# Patient Record
Sex: MALE | Race: WHITE | Employment: OTHER | URBAN - METROPOLITAN AREA
[De-identification: names, ages, dates, MRNs, and addresses within clinical notes are randomized per-mention and may not be internally consistent; named-entity substitution may affect disease eponyms.]

---

## 2017-02-23 ENCOUNTER — OFFICE VISIT (OUTPATIENT)
Dept: CARDIOLOGY CLINIC | Age: 67
End: 2017-02-23

## 2017-02-23 ENCOUNTER — HOSPITAL ENCOUNTER (OUTPATIENT)
Dept: LAB | Age: 67
Discharge: HOME | End: 2017-02-23
Payer: MEDICARE

## 2017-02-23 VITALS
HEIGHT: 70 IN | RESPIRATION RATE: 16 BRPM | WEIGHT: 232 LBS | DIASTOLIC BLOOD PRESSURE: 90 MMHG | OXYGEN SATURATION: 98 % | SYSTOLIC BLOOD PRESSURE: 140 MMHG | BODY MASS INDEX: 33.21 KG/M2 | HEART RATE: 114 BPM

## 2017-02-23 DIAGNOSIS — E78.2 MIXED HYPERLIPIDEMIA: ICD-10-CM

## 2017-02-23 DIAGNOSIS — I10 ESSENTIAL HYPERTENSION: ICD-10-CM

## 2017-02-23 DIAGNOSIS — I25.10 CORONARY ARTERY DISEASE INVOLVING NATIVE CORONARY ARTERY OF NATIVE HEART WITHOUT ANGINA PECTORIS: Primary | ICD-10-CM

## 2017-02-23 DIAGNOSIS — I48.0 PAROXYSMAL ATRIAL FIBRILLATION (HCC): ICD-10-CM

## 2017-02-23 DIAGNOSIS — E11.65 TYPE 2 DIABETES MELLITUS WITH HYPERGLYCEMIA, WITHOUT LONG-TERM CURRENT USE OF INSULIN (HCC): ICD-10-CM

## 2017-02-23 DIAGNOSIS — I49.3 PVC (PREMATURE VENTRICULAR CONTRACTION): ICD-10-CM

## 2017-02-23 PROCEDURE — 36415 COLL VENOUS BLD VENIPUNCTURE: CPT

## 2017-02-23 PROCEDURE — 80061 LIPID PANEL: CPT

## 2017-02-23 PROCEDURE — 80053 COMPREHEN METABOLIC PANEL: CPT

## 2017-02-23 RX ORDER — CARVEDILOL 12.5 MG/1
TABLET ORAL 2 TIMES DAILY WITH MEALS
COMMUNITY
End: 2017-06-19 | Stop reason: SDUPTHER

## 2017-02-23 NOTE — PROGRESS NOTES
Chief Complaint   Patient presents with    Hypertension     6 month follow up. Concerns regarding heart rate, BP. States in increase stress. Denies chest pain/shortness of breath/swelling.  Coronary Artery Disease     Patient states medical research on [employer]. States recent episode of dizziness,nausea while driving. States possible panic attack.

## 2017-02-23 NOTE — PROGRESS NOTES
Patient was given printed rx for eliquis 5 mg bid to price. Coreg was doubled to 12.5 mg bid. He will double up on 6.25 mg and take 2 tabs in the am and 2 tabs in the pm. He will call if he needs refill prior to 1 mo f/u. I changed dose to 12.5 mg bid in med rec.

## 2017-02-23 NOTE — PROGRESS NOTES
HISTORY OF PRESENT ILLNESS  Cezar Salas is a 78 y.o. male     SUMMARY:   Problem List  Date Reviewed: 2/23/2017          Codes Class Noted    PVC (premature ventricular contraction) ICD-10-CM: I49.3  ICD-9-CM: 427.69  9/4/2014        HTN (hypertension) ICD-10-CM: I10  ICD-9-CM: 401.9  7/21/2014        Hyperlipidemia ICD-10-CM: E78.5  ICD-9-CM: 272.4  7/21/2014        CAD (coronary artery disease) ICD-10-CM: I25.10  ICD-9-CM: 414.00  7/21/2014    Overview Addendum 9/4/2014 11:55 AM by Abhijit Pereyra MD     12/12 abnormal stress test chippenham, then bms to ramus, mod distal disease, normal lvef  60-70% distal pda and lad. Current Outpatient Prescriptions on File Prior to Visit   Medication Sig    carvedilol (COREG) 6.25 mg tablet Take 1 Tab by mouth two (2) times daily (with meals).  atorvastatin (LIPITOR) 40 mg tablet Take 1 Tab by mouth nightly.  OMEPRAZOLE (PRILOSEC PO) Take 20 mg by mouth every other day.  aspirin delayed-release 81 mg tablet Take  by mouth daily. No current facility-administered medications on file prior to visit. CARDIOLOGY STUDIES TO DATE:  12/12 exercise cardiolyte apical ischemia, lvef 61%  12/12 normal echo      Chief Complaint   Patient presents with    Hypertension     6 month follow up. Concerns regarding heart rate, BP. States in increase stress. Denies chest pain/shortness of breath/swelling.  Coronary Artery Disease     HPI :  Mr. Marroquin is doing okay, though for the last month or two he has noticed an irregular heartbeat when he checks his home blood pressure, and his blood pressures have been trending a little higher. He is still walking some but has not done as much as he would like to, though hopefully with the change in the weather things will get better. His EKG today shows atrial fibrillation with a moderate ventricular response.      CARDIAC ROS:   negative for chest pain, dyspnea, syncope, orthopnea, paroxysmal nocturnal dyspnea, exertional chest pressure/discomfort, claudication, lower extremity edema    Family History   Problem Relation Age of Onset    Heart Disease Father        Past Medical History:   Diagnosis Date    Diverticulosis     Duodenal ulcer        GENERAL ROS:  A comprehensive review of systems was negative except for that written in the HPI.     Visit Vitals    /90 (BP 1 Location: Left arm, BP Patient Position: Sitting)    Pulse 88  Comment: irregular    Resp 16    Ht 5' 10\" (1.778 m)    Wt 232 lb (105.2 kg)    SpO2 98%    BMI 33.29 kg/m2       Wt Readings from Last 3 Encounters:   02/23/17 232 lb (105.2 kg)   08/25/16 232 lb (105.2 kg)   03/03/16 228 lb 4.8 oz (103.6 kg)            BP Readings from Last 3 Encounters:   02/23/17 140/90   08/25/16 140/70   03/03/16 144/88       PHYSICAL EXAM  General appearance: alert, cooperative, no distress, appears stated age  Neck: supple, symmetrical, trachea midline, no adenopathy, no carotid bruit and no JVD  Lungs: clear to auscultation bilaterally  Heart: irregularly irregular rhythm, S1, S2 normal, no S3 or S4  Extremities: extremities normal, atraumatic, no cyanosis or edema    Lab Results   Component Value Date/Time    Cholesterol, total 151 08/25/2016 10:37 AM    Cholesterol, total 141 03/03/2016 11:42 AM    Cholesterol, total 164 09/03/2015 11:46 AM    Cholesterol, total 185 03/05/2015 11:14 AM    HDL Cholesterol 29 08/25/2016 10:37 AM    HDL Cholesterol 28 03/03/2016 11:42 AM    HDL Cholesterol 32 09/03/2015 11:46 AM    HDL Cholesterol 32 03/05/2015 11:14 AM    LDL, calculated 93 08/25/2016 10:37 AM    LDL, calculated 91 03/03/2016 11:42 AM    LDL, calculated 102 09/03/2015 11:46 AM    LDL, calculated 116 03/05/2015 11:14 AM    Triglyceride 145 08/25/2016 10:37 AM    Triglyceride 111 03/03/2016 11:42 AM    Triglyceride 152 09/03/2015 11:46 AM    Triglyceride 185 03/05/2015 11:14 AM     ASSESSMENT  Mr. Layla Bedolla has A-fib of uncertain onset, and he is minimally symptomatic at this point. His rate is up and blood pressure is up, so we are going to double his Coreg, and he was given written instructions in that regard. We talked about the pros and cons of Coumadin versus the newer oral anticoagulants, and based on his CHADS score he should be on something other than aspirin. He has elected to try Eliquis if it is affordable, and if not he will let us know and we will enroll him in the Coumadin clinic. Once he starts an oral anticoagulant I think it would be wise for him to stop his aspirin. We are going to send him to the lab today for blood work including a fasting lipid profile. current treatment plan is effective, no change in therapy  lab results and schedule of future lab studies reviewed with patient  reviewed diet, exercise and weight control    Encounter Diagnoses   Name Primary?  Coronary artery disease involving native coronary artery of native heart without angina pectoris Yes    PVC (premature ventricular contraction)     Essential hypertension     Mixed hyperlipidemia      No orders of the defined types were placed in this encounter. Follow-up Disposition:  Return in about 6 months (around 8/23/2017).     Asim Alexander MD  2/23/2017

## 2017-02-23 NOTE — LETTER
2/24/2017 12:23 PM 
 
Mr. Carola Michaels [address] [place] [id number] Dear Syed Horta English: 
 
Please find your most recent results below. Resulted Orders LIPID PANEL Result Value Ref Range Cholesterol, total 148 100 - 199 mg/dL Triglyceride 166 (H) 0 - 149 mg/dL HDL Cholesterol 31 (L) >39 mg/dL VLDL, calculated 33 5 - 40 mg/dL LDL, calculated 84 0 - 99 mg/dL Narrative Performed at:  [address]  [id number] : Alfredo Gresham MD, Phone:  [phone] METABOLIC PANEL, COMPREHENSIVE Result Value Ref Range Glucose 178 (H) 65 - 99 mg/dL BUN 15 8 - 27 mg/dL Creatinine 0.74 (L) 0.76 - 1.27 mg/dL GFR est non-AA 95 >59 mL/min/1.73 GFR est  >59 mL/min/1.73  
 BUN/Creatinine ratio 20 10 - 22 Sodium 138 134 - 144 mmol/L Potassium 4.8 3.5 - 5.2 mmol/L Chloride 98 96 - 106 mmol/L  
 CO2 21 18 - 29 mmol/L Calcium 9.7 8.6 - 10.2 mg/dL Protein, total 7.4 6.0 - 8.5 g/dL Albumin 4.6 3.6 - 4.8 g/dL GLOBULIN, TOTAL 2.8 1.5 - 4.5 g/dL A-G Ratio 1.6 1.1 - 2.5 Comment: **Effective March 13, 2017 the reference interval** 
  for A/G Ratio will be changing to: Age                Male          Female 0 -  7 days       1.1 - 2.3       1.1 - 2.3 
          8 - 30 days       1.2 - 2.8       1.2 - 2.8 
          1 -  6 months     1.3 - 3.6       1.3 - 3.6 
   7 months -  5 years      1.5 - 2.6       1.5 - 2.6 
             > 5 years      1.2 - 2.2       1.2 - 2.2 Bilirubin, total 0.9 0.0 - 1.2 mg/dL Alk. phosphatase 86 39 - 117 IU/L  
 AST (SGOT) 18 0 - 40 IU/L  
 ALT (SGPT) 17 0 - 44 IU/L Narrative Performed at:  [address]  [id number] : Alfredo Gresham MD, Phone:  [phone] CVD REPORT Result Value Ref Range INTERPRETATION Note Comment: Supplement report is available. Narrative Performed at:  [address] [place] [address]  [id number] : Peri Bean PhD, Phone:  [phone] RECOMMENDATIONS: Your cholesterol is good. Your glucose is 178. Stay on medications and repeat labs in 6 months. Please call me if you have any questions: [phone] Sincerely, MD Eh Hoffman, RN

## 2017-02-23 NOTE — MR AVS SNAPSHOT
Visit Information Date & Time Provider Department Dept. Phone Encounter #  
 2/23/2017  9:40 AM Daisy Guerrero MD [facility] Delon Gutierrez [phone] [id number] Follow-up Instructions Return in about 6 months (around 8/23/2017). Upcoming Health Maintenance Date Due Hepatitis C Screening 1950 DTaP/Tdap/Td series (1 - Tdap) 3/3/1970 FOBT Q 1 YEAR AGE 50-75 3/3/1999 ZOSTER VACCINE AGE 60> 3/3/2009 GLAUCOMA SCREENING Q2Y 3/3/2014 Pneumococcal 65+ Low/Medium Risk (1 of 2 - PCV13) 3/3/2014 MEDICARE YEARLY EXAM 3/3/2014 INFLUENZA AGE 9 TO ADULT 8/1/2016 Allergies as of 2/23/2017  Review Complete On: 2/23/2017 By: Daisy Guerrero MD  
  
 Severity Noted Reaction Type Reactions Enalapril  09/03/2015    Cough Hydrochlorothiazide  07/21/2014   Side Effect Other (comments) Weakness, fatigue, very sick, extreme weight loss Current Immunizations  Never Reviewed No immunizations on file. Not reviewed this visit You Were Diagnosed With   
  
 Codes Comments Coronary artery disease involving native coronary artery of native heart without angina pectoris    -  Primary ICD-10-CM: I25.10 ICD-9-CM: 414.01   
 PVC (premature ventricular contraction)     ICD-10-CM: I49.3 ICD-9-CM: 427.69 Essential hypertension     ICD-10-CM: I10 
ICD-9-CM: 401.9 Mixed hyperlipidemia     ICD-10-CM: E78.2 ICD-9-CM: 272.2 Type 2 diabetes mellitus with hyperglycemia, without long-term current use of insulin (HCC)     ICD-10-CM: E11.65 ICD-9-CM: 250.00, 790.29 Vitals BP  
  
  
  
  
  
 140/90 (BP 1 Location: Left arm, BP Patient Position: Sitting) Vitals History BMI and BSA Data Body Mass Index Body Surface Area  
 33.29 kg/m 2 2.28 m 2 Preferred Pharmacy Pharmacy Name Phone Dyllan Larsen [id number] [address] [phone] Your Updated Medication List  
  
   
 This list is accurate as of: 2/23/17 10:27 AM.  Always use your most recent med list.  
  
  
  
  
 apixaban 5 mg tablet Commonly known as:  Virgel Nissa Take 1 Tab by mouth two (2) times a day. aspirin delayed-release 81 mg tablet Take  by mouth daily. atorvastatin 40 mg tablet Commonly known as:  LIPITOR Take 1 Tab by mouth nightly. COREG 12.5 mg tablet Generic drug:  carvedilol Take  by mouth two (2) times daily (with meals). PRILOSEC PO Take 20 mg by mouth every other day. Prescriptions Printed Refills  
 apixaban (ELIQUIS) 5 mg tablet 12 Sig: Take 1 Tab by mouth two (2) times a day. Class: Print Route: Oral  
  
We Performed the Following AMB POC EKG ROUTINE W/ 12 LEADS, INTER & REP [28589 CPT(R)] LIPID PANEL [93534 CPT(R)] METABOLIC PANEL, COMPREHENSIVE [90079 CPT(R)] Follow-up Instructions Return in about 6 months (around 8/23/2017). Introducing [facility] & HEALTH SERVICES! Dear Ovidio Rene: Thank you for requesting a [employer] account. Our records indicate that you already have an active [employer] account. You can access your account anytime at [email or web address] Did you know that you can access your hospital and ER discharge instructions at any time in [employer]? You can also review all of your test results from your hospital stay or ER visit. Additional Information If you have questions, please visit the Frequently Asked Questions section of the [employer] website at [email or web address] Remember, [employer] is NOT to be used for urgent needs. For medical emergencies, dial 911. Now available from your iPhone and Android! Please provide this summary of care documentation to your next provider. Your primary care clinician is listed as Zachery Darnell. If you have any questions after today's visit, please call [phone].

## 2017-02-24 LAB
ALBUMIN SERPL-MCNC: 4.6 G/DL (ref 3.6–4.8)
ALBUMIN/GLOB SERPL: 1.6 {RATIO} (ref 1.1–2.5)
ALP SERPL-CCNC: 86 IU/L (ref 39–117)
ALT SERPL-CCNC: 17 IU/L (ref 0–44)
AST SERPL-CCNC: 18 IU/L (ref 0–40)
BILIRUB SERPL-MCNC: 0.9 MG/DL (ref 0–1.2)
BUN SERPL-MCNC: 15 MG/DL (ref 8–27)
BUN/CREAT SERPL: 20 (ref 10–22)
CALCIUM SERPL-MCNC: 9.7 MG/DL (ref 8.6–10.2)
CHLORIDE SERPL-SCNC: 98 MMOL/L (ref 96–106)
CHOLEST SERPL-MCNC: 148 MG/DL (ref 100–199)
CO2 SERPL-SCNC: 21 MMOL/L (ref 18–29)
CREAT SERPL-MCNC: 0.74 MG/DL (ref 0.76–1.27)
GLOBULIN SER CALC-MCNC: 2.8 G/DL (ref 1.5–4.5)
GLUCOSE SERPL-MCNC: 178 MG/DL (ref 65–99)
HDLC SERPL-MCNC: 31 MG/DL
INTERPRETATION, 910389: NORMAL
LDLC SERPL CALC-MCNC: 84 MG/DL (ref 0–99)
POTASSIUM SERPL-SCNC: 4.8 MMOL/L (ref 3.5–5.2)
PROT SERPL-MCNC: 7.4 G/DL (ref 6–8.5)
SODIUM SERPL-SCNC: 138 MMOL/L (ref 134–144)
TRIGL SERPL-MCNC: 166 MG/DL (ref 0–149)
VLDLC SERPL CALC-MCNC: 33 MG/DL (ref 5–40)

## 2017-02-27 ENCOUNTER — TELEPHONE (OUTPATIENT)
Dept: CARDIOLOGY CLINIC | Age: 67
End: 2017-02-27

## 2017-02-27 DIAGNOSIS — I25.10 CORONARY ARTERY DISEASE INVOLVING NATIVE CORONARY ARTERY OF NATIVE HEART WITHOUT ANGINA PECTORIS: Primary | ICD-10-CM

## 2017-02-27 RX ORDER — WARFARIN SODIUM 5 MG/1
5 TABLET ORAL DAILY
Qty: 30 TAB | Refills: 5 | Status: SHIPPED | OUTPATIENT
Start: 2017-02-27 | End: 2017-03-23 | Stop reason: SDUPTHER

## 2017-02-27 NOTE — TELEPHONE ENCOUNTER
Called patient. He wants to start coumadin instead of eliquis due to cost of eliquis. Verified patient's pharmacy. Rx for coumadin 5 mg sent. Patient will pick it up tomorrow and start it tomorrow night. He will stop aspirin. Scheduled him an appointment to come in for INR check next Tuesday. Patient verbalizes understanding and denies further questions or concerns.

## 2017-02-27 NOTE — TELEPHONE ENCOUNTER
Patient called regarding rx for Eliqius he stated that it is too expensive and he will not be able to get that each month. He would like for someone to call him regarding coumadin. He can be reached at [phone].  Thank you

## 2017-03-07 ENCOUNTER — CLINICAL SUPPORT (OUTPATIENT)
Dept: CARDIOLOGY CLINIC | Age: 67
End: 2017-03-07

## 2017-03-07 DIAGNOSIS — I48.0 PAROXYSMAL ATRIAL FIBRILLATION (HCC): Primary | ICD-10-CM

## 2017-03-07 DIAGNOSIS — Z79.01 LONG TERM (CURRENT) USE OF ANTICOAGULANTS: ICD-10-CM

## 2017-03-07 LAB
INR BLD: NORMAL
INR, EXTERNAL: 1.4 (ref 2–3)
PT POC: NORMAL SEC
VALID INTERNAL CONTROL?: YES

## 2017-03-13 ENCOUNTER — CLINICAL SUPPORT (OUTPATIENT)
Dept: CARDIOLOGY CLINIC | Age: 67
End: 2017-03-13

## 2017-03-13 DIAGNOSIS — I48.0 PAROXYSMAL ATRIAL FIBRILLATION (HCC): Primary | ICD-10-CM

## 2017-03-13 DIAGNOSIS — Z79.01 LONG TERM (CURRENT) USE OF ANTICOAGULANTS: ICD-10-CM

## 2017-03-13 LAB
INR BLD: NORMAL
INR, EXTERNAL: 1.5 (ref 2–3)
PT POC: NORMAL SEC
VALID INTERNAL CONTROL?: YES

## 2017-03-13 NOTE — PROGRESS NOTES

## 2017-03-16 ENCOUNTER — CLINICAL SUPPORT (OUTPATIENT)
Dept: CARDIOLOGY CLINIC | Age: 67
End: 2017-03-16

## 2017-03-16 DIAGNOSIS — I48.0 PAROXYSMAL ATRIAL FIBRILLATION (HCC): ICD-10-CM

## 2017-03-16 DIAGNOSIS — Z79.01 LONG TERM (CURRENT) USE OF ANTICOAGULANTS: Primary | ICD-10-CM

## 2017-03-16 LAB
INR BLD: NORMAL
INR, EXTERNAL: 1.8 (ref 2–3)
PT POC: NORMAL SEC
VALID INTERNAL CONTROL?: YES

## 2017-03-16 RX ORDER — METFORMIN HYDROCHLORIDE 500 MG/1
1000 TABLET ORAL 2 TIMES DAILY WITH MEALS
COMMUNITY
End: 2021-09-24 | Stop reason: SDUPTHER

## 2017-03-23 ENCOUNTER — TELEPHONE (OUTPATIENT)
Dept: CARDIOLOGY CLINIC | Age: 67
End: 2017-03-23

## 2017-03-23 ENCOUNTER — OFFICE VISIT (OUTPATIENT)
Dept: CARDIOLOGY CLINIC | Age: 67
End: 2017-03-23

## 2017-03-23 ENCOUNTER — CLINICAL SUPPORT (OUTPATIENT)
Dept: CARDIOLOGY CLINIC | Age: 67
End: 2017-03-23

## 2017-03-23 VITALS
DIASTOLIC BLOOD PRESSURE: 82 MMHG | SYSTOLIC BLOOD PRESSURE: 110 MMHG | HEIGHT: 70 IN | HEART RATE: 64 BPM | WEIGHT: 232.8 LBS | BODY MASS INDEX: 33.33 KG/M2 | RESPIRATION RATE: 16 BRPM

## 2017-03-23 DIAGNOSIS — I25.10 CORONARY ARTERY DISEASE INVOLVING NATIVE CORONARY ARTERY OF NATIVE HEART WITHOUT ANGINA PECTORIS: ICD-10-CM

## 2017-03-23 DIAGNOSIS — I48.0 PAROXYSMAL ATRIAL FIBRILLATION (HCC): Primary | ICD-10-CM

## 2017-03-23 DIAGNOSIS — I49.3 PVC (PREMATURE VENTRICULAR CONTRACTION): ICD-10-CM

## 2017-03-23 DIAGNOSIS — Z79.01 LONG TERM (CURRENT) USE OF ANTICOAGULANTS: ICD-10-CM

## 2017-03-23 DIAGNOSIS — I10 ESSENTIAL HYPERTENSION: ICD-10-CM

## 2017-03-23 DIAGNOSIS — E78.2 MIXED HYPERLIPIDEMIA: ICD-10-CM

## 2017-03-23 LAB
INR BLD: NORMAL
INR, EXTERNAL: 3 (ref 2–3)
PT POC: NORMAL SEC
VALID INTERNAL CONTROL?: YES

## 2017-03-23 RX ORDER — WARFARIN SODIUM 5 MG/1
5 TABLET ORAL DAILY
Qty: 30 TAB | Refills: 5 | Status: SHIPPED | OUTPATIENT
Start: 2017-03-23 | End: 2017-06-19 | Stop reason: SDUPTHER

## 2017-03-23 NOTE — MR AVS SNAPSHOT
Visit Information Date & Time Provider Department Dept. Phone Encounter #  
 3/23/2017  9:40 AM Denis Romero MD [facility] Arslan Ray [phone] [id number] Your Appointments 4/7/2017 10:00 AM  
COUMADIN CLINIC with YOVANA MCGOVERN [facility] ([facility] SCHEDULING) Appt Note: 2 weeks [address] [place] [address]  
[address] [facility]  
  
    
 9/21/2017  9:40 AM  
ESTABLISHED PATIENT with Denis Romero MD  
[facility] Appt Note: 6 mo f/u  
 [address] [place] [address]  
[address] [phone] Upcoming Health Maintenance Date Due Hepatitis C Screening 1950 FOOT EXAM Q1 3/3/1959 MICROALBUMIN Q1 3/3/1959 EYE EXAM RETINAL OR DILATED Q1 3/3/1959 DTaP/Tdap/Td series (1 - Tdap) 3/3/1970 FOBT Q 1 YEAR AGE 50-75 3/3/1999 ZOSTER VACCINE AGE 60> 3/3/2009 GLAUCOMA SCREENING Q2Y 3/3/2014 Pneumococcal 65+ Low/Medium Risk (1 of 2 - PCV13) 3/3/2014 MEDICARE YEARLY EXAM 3/3/2014 HEMOGLOBIN A1C Q6M 11/16/2014 INFLUENZA AGE 9 TO ADULT 8/1/2016 LIPID PANEL Q1 2/23/2018 Allergies as of 3/23/2017  Review Complete On: 3/23/2017 By: Denis Romero MD  
  
 Severity Noted Reaction Type Reactions Enalapril  09/03/2015    Cough Hydrochlorothiazide  07/21/2014   Side Effect Other (comments) Weakness, fatigue, very sick, extreme weight loss Current Immunizations  Never Reviewed No immunizations on file. Not reviewed this visit You Were Diagnosed With   
  
 Codes Comments Paroxysmal atrial fibrillation (HCC)    -  Primary ICD-10-CM: I48.0 ICD-9-CM: 427.31 PVC (premature ventricular contraction)     ICD-10-CM: I49.3 ICD-9-CM: 427.69  Essential hypertension     ICD-10-CM: I10 
 ICD-9-CM: 401.9 Mixed hyperlipidemia     ICD-10-CM: E78.2 ICD-9-CM: 272.2 Coronary artery disease involving native coronary artery of native heart without angina pectoris     ICD-10-CM: I25.10 ICD-9-CM: 414.01 Vitals BP Pulse Resp Height(growth percentile) Weight(growth percentile) BMI  
 110/82 64 16 5' 10\" (1.778 m) 232 lb 12.8 oz (105.6 kg) 33.4 kg/m2 Smoking Status Former Smoker BMI and BSA Data Body Mass Index Body Surface Area  
 33.4 kg/m 2 2.28 m 2 Preferred Pharmacy Pharmacy Name [facility], [address] [place] [address] Your Updated Medication List  
  
   
This list is accurate as of: 3/23/17  9:58 AM.  Always use your most recent med list.  
  
  
  
  
 atorvastatin 40 mg tablet Commonly known as:  LIPITOR Take 1 Tab by mouth nightly. COREG 12.5 mg tablet Generic drug:  carvedilol Take  by mouth two (2) times daily (with meals). metFORMIN 500 mg tablet Commonly known as:  GLUCOPHAGE Take  by mouth two (2) times daily (with meals). PRILOSEC PO Take 20 mg by mouth every other day. warfarin 5 mg tablet Commonly known as:  COUMADIN Take 1 Tab by mouth daily. Or take as directed by coumadin clinic nurse Prescriptions Sent to Pharmacy Refills  
 warfarin (COUMADIN) 5 mg tablet 5 Sig: Take 1 Tab by mouth daily. Or take as directed by coumadin clinic nurse Class: Normal  
 Pharmacy: [address], [address] [place] [address] Ph #: [phone] Route: Oral  
  
Introducing [facility]! Dear Lui Court: Thank you for requesting a [employer] account. Our records indicate that you already have an active [employer] account. You can access your account anytime at [email or web address] Did you know that you can access your hospital and ER discharge instructions at any time in [employer]? You can also review all of your test results from your hospital stay or ER visit. Additional Information If you have questions, please visit the Frequently Asked Questions section of the [employer] website at [email or web address] Remember, [employer] is NOT to be used for urgent needs. For medical emergencies, dial 911. Now available from your iPhone and Android! Please provide this summary of care documentation to your next provider. Your primary care clinician is listed as Jameel Vásquez. If you have any questions after today's visit, please call [phone].

## 2017-03-23 NOTE — PROGRESS NOTES
HISTORY OF PRESENT ILLNESS  Paddy Lala is a 75 y.o. male     SUMMARY:   Problem List  Date Reviewed: 3/23/2017          Codes Class Noted    Paroxysmal atrial fibrillation (HCC) ICD-10-CM: I48.0  ICD-9-CM: 427.31  3/7/2017        Type 2 diabetes mellitus with hyperglycemia, without long-term current use of insulin (HCC) ICD-10-CM: E11.65  ICD-9-CM: 250.00, 790.29  2/23/2017        PVC (premature ventricular contraction) ICD-10-CM: I49.3  ICD-9-CM: 427.69  9/4/2014        HTN (hypertension) ICD-10-CM: I10  ICD-9-CM: 401.9  7/21/2014        Hyperlipidemia ICD-10-CM: E78.5  ICD-9-CM: 272.4  7/21/2014        CAD (coronary artery disease) ICD-10-CM: I25.10  ICD-9-CM: 414.00  7/21/2014    Overview Addendum 9/4/2014 11:55 AM by Elicia Nash MD     12/12 abnormal stress test chippenham, then bms to ramus, mod distal disease, normal lvef  60-70% distal pda and lad. Current Outpatient Prescriptions on File Prior to Visit   Medication Sig    metFORMIN (GLUCOPHAGE) 500 mg tablet Take  by mouth two (2) times daily (with meals).  carvedilol (COREG) 12.5 mg tablet Take  by mouth two (2) times daily (with meals).  atorvastatin (LIPITOR) 40 mg tablet Take 1 Tab by mouth nightly.  OMEPRAZOLE (PRILOSEC PO) Take 20 mg by mouth every other day. No current facility-administered medications on file prior to visit. CARDIOLOGY STUDIES TO DATE:  12/12 exercise cardiolyte apical ischemia, lvef 61%  12/12 normal echo       Chief Complaint   Patient presents with    Irregular Heart Beat     HPI :  Mr. Marroquin could not afford the Eliquis, so he is now on Coumadin and doing just fine. He is rarely aware of any sort of irregular heartbeat. Blood pressure is under good control, and he is now taking metformin for his diabetes which I am happy about. He is walking anywhere from three to five miles a day, weather permitting, with no worrisome cardiac symptoms.      CARDIAC ROS:   negative for chest pain, dyspnea, syncope, orthopnea, paroxysmal nocturnal dyspnea, exertional chest pressure/discomfort, claudication, lower extremity edema    Family History   Problem Relation Age of Onset    Heart Disease Father        Past Medical History:   Diagnosis Date    Diverticulosis     Duodenal ulcer        GENERAL ROS:  A comprehensive review of systems was negative except for that written in the HPI.     Visit Vitals    /82    Pulse 64    Resp 16    Ht 5' 10\" (1.778 m)    Wt 232 lb 12.8 oz (105.6 kg)    BMI 33.4 kg/m2       Wt Readings from Last 3 Encounters:   03/23/17 232 lb 12.8 oz (105.6 kg)   02/23/17 232 lb (105.2 kg)   08/25/16 232 lb (105.2 kg)            BP Readings from Last 3 Encounters:   03/23/17 110/82   02/23/17 140/90   08/25/16 140/70       PHYSICAL EXAM  General appearance: alert, cooperative, no distress, appears stated age  Neck: supple, symmetrical, trachea midline, no adenopathy, no carotid bruit and no JVD  Lungs: clear to auscultation bilaterally  Heart: irregularly irregular rhythm, S1, S2 normal, no S3 or S4  Extremities: extremities normal, atraumatic, no cyanosis or edema    Lab Results   Component Value Date/Time    Cholesterol, total 148 02/23/2017 11:14 AM    Cholesterol, total 151 08/25/2016 10:37 AM    Cholesterol, total 141 03/03/2016 11:42 AM    Cholesterol, total 164 09/03/2015 11:46 AM    Cholesterol, total 185 03/05/2015 11:14 AM    HDL Cholesterol 31 02/23/2017 11:14 AM    HDL Cholesterol 29 08/25/2016 10:37 AM    HDL Cholesterol 28 03/03/2016 11:42 AM    HDL Cholesterol 32 09/03/2015 11:46 AM    HDL Cholesterol 32 03/05/2015 11:14 AM    LDL, calculated 84 02/23/2017 11:14 AM    LDL, calculated 93 08/25/2016 10:37 AM    LDL, calculated 91 03/03/2016 11:42 AM    LDL, calculated 102 09/03/2015 11:46 AM    LDL, calculated 116 03/05/2015 11:14 AM    Triglyceride 166 02/23/2017 11:14 AM    Triglyceride 145 08/25/2016 10:37 AM    Triglyceride 111 03/03/2016 11:42 AM Triglyceride 152 09/03/2015 11:46 AM    Triglyceride 185 03/05/2015 11:14 AM     ASSESSMENT  Mr. Marroquin is stable and minimally if at all symptomatic from a cardiac standpoint on a good medical regimen and needs no cardiac testing at this time. current treatment plan is effective, no change in therapy  lab results and schedule of future lab studies reviewed with patient  reviewed diet, exercise and weight control    Encounter Diagnoses   Name Primary?  Paroxysmal atrial fibrillation (HCC) Yes    PVC (premature ventricular contraction)     Essential hypertension     Mixed hyperlipidemia     Coronary artery disease involving native coronary artery of native heart without angina pectoris      Orders Placed This Encounter    warfarin (COUMADIN) 5 mg tablet       Follow-up Disposition:  Return in about 6 months (around 9/23/2017).     Makenzie Haynes MD  3/23/2017

## 2017-04-07 ENCOUNTER — CLINICAL SUPPORT (OUTPATIENT)
Dept: CARDIOLOGY CLINIC | Age: 67
End: 2017-04-07

## 2017-04-07 DIAGNOSIS — I48.0 PAROXYSMAL ATRIAL FIBRILLATION (HCC): ICD-10-CM

## 2017-04-07 DIAGNOSIS — Z79.01 LONG TERM (CURRENT) USE OF ANTICOAGULANTS: Primary | ICD-10-CM

## 2017-04-07 LAB
INR BLD: NORMAL
INR, EXTERNAL: 2.7 (ref 2–3)
PT POC: NORMAL SEC
VALID INTERNAL CONTROL?: YES

## 2017-05-12 ENCOUNTER — CLINICAL SUPPORT (OUTPATIENT)
Dept: CARDIOLOGY CLINIC | Age: 67
End: 2017-05-12

## 2017-05-12 DIAGNOSIS — Z79.01 LONG TERM (CURRENT) USE OF ANTICOAGULANTS: ICD-10-CM

## 2017-05-12 DIAGNOSIS — I48.0 PAROXYSMAL ATRIAL FIBRILLATION (HCC): Primary | ICD-10-CM

## 2017-05-12 LAB
INR BLD: NORMAL
INR, EXTERNAL: 2.1 (ref 2–3)
PT POC: NORMAL SEC
VALID INTERNAL CONTROL?: YES

## 2017-06-19 ENCOUNTER — CLINICAL SUPPORT (OUTPATIENT)
Dept: CARDIOLOGY CLINIC | Age: 67
End: 2017-06-19

## 2017-06-19 DIAGNOSIS — I48.0 PAROXYSMAL ATRIAL FIBRILLATION (HCC): ICD-10-CM

## 2017-06-19 DIAGNOSIS — Z79.01 LONG TERM (CURRENT) USE OF ANTICOAGULANTS: Primary | ICD-10-CM

## 2017-06-19 DIAGNOSIS — I25.10 CORONARY ARTERY DISEASE INVOLVING NATIVE CORONARY ARTERY OF NATIVE HEART WITHOUT ANGINA PECTORIS: ICD-10-CM

## 2017-06-19 LAB
INR BLD: NORMAL
INR, EXTERNAL: 2.3 (ref 2–3)
PT POC: NORMAL SECONDS
VALID INTERNAL CONTROL?: YES

## 2017-06-19 RX ORDER — CARVEDILOL 12.5 MG/1
12.5 TABLET ORAL 2 TIMES DAILY WITH MEALS
Qty: 60 TAB | Refills: 5 | Status: SHIPPED | OUTPATIENT
Start: 2017-06-19 | End: 2019-03-25

## 2017-06-19 RX ORDER — WARFARIN SODIUM 5 MG/1
5 TABLET ORAL DAILY
Qty: 60 TAB | Refills: 5 | Status: SHIPPED | OUTPATIENT
Start: 2017-06-19 | End: 2018-02-14 | Stop reason: SDUPTHER

## 2017-06-19 NOTE — TELEPHONE ENCOUNTER
Requested Prescriptions     Signed Prescriptions Disp Refills    carvedilol (COREG) 12.5 mg tablet 60 Tab 5     Sig: Take 1 Tab by mouth two (2) times daily (with meals). Authorizing Provider: Levon Fung User: Earl Heath    warfarin (COUMADIN) 5 mg tablet 60 Tab 5     Sig: Take 1 Tab by mouth daily. Or take as directed by coumadin clinic nurse     Authorizing Provider: Levon Fung User: Earl Heath     Verbal order per Dr. Berenice Simmonds. 6 month follow up with Dr. Angelica Galvez scheduled on 9-21-17.

## 2017-07-17 ENCOUNTER — CLINICAL SUPPORT (OUTPATIENT)
Dept: CARDIOLOGY CLINIC | Age: 67
End: 2017-07-17

## 2017-07-17 DIAGNOSIS — I48.0 PAROXYSMAL ATRIAL FIBRILLATION (HCC): ICD-10-CM

## 2017-07-17 DIAGNOSIS — Z79.01 LONG TERM (CURRENT) USE OF ANTICOAGULANTS: Primary | ICD-10-CM

## 2017-07-17 LAB
INR BLD: NORMAL
INR, EXTERNAL: 2.7 (ref 2–3)
PT POC: NORMAL SECONDS
VALID INTERNAL CONTROL?: YES

## 2017-07-17 NOTE — PROGRESS NOTES

## 2017-08-17 ENCOUNTER — TELEPHONE (OUTPATIENT)
Dept: CARDIOLOGY CLINIC | Age: 67
End: 2017-08-17

## 2017-08-29 ENCOUNTER — CLINICAL SUPPORT (OUTPATIENT)
Dept: CARDIOLOGY CLINIC | Age: 67
End: 2017-08-29

## 2017-08-29 DIAGNOSIS — I48.0 PAROXYSMAL ATRIAL FIBRILLATION (HCC): ICD-10-CM

## 2017-08-29 LAB
INR BLD: NORMAL
INR, EXTERNAL: 2.8 (ref 2–3)
PT POC: NORMAL SECONDS
VALID INTERNAL CONTROL?: YES

## 2017-09-21 ENCOUNTER — OFFICE VISIT (OUTPATIENT)
Dept: CARDIOLOGY CLINIC | Age: 67
End: 2017-09-21

## 2017-09-21 ENCOUNTER — CLINICAL SUPPORT (OUTPATIENT)
Dept: CARDIOLOGY CLINIC | Age: 67
End: 2017-09-21

## 2017-09-21 ENCOUNTER — HOSPITAL ENCOUNTER (OUTPATIENT)
Dept: LAB | Age: 67
Discharge: HOME | End: 2017-09-21
Payer: MEDICARE

## 2017-09-21 VITALS
WEIGHT: 241.4 LBS | HEART RATE: 89 BPM | DIASTOLIC BLOOD PRESSURE: 82 MMHG | HEIGHT: 70 IN | SYSTOLIC BLOOD PRESSURE: 130 MMHG | OXYGEN SATURATION: 98 % | RESPIRATION RATE: 14 BRPM | BODY MASS INDEX: 34.56 KG/M2

## 2017-09-21 DIAGNOSIS — I48.0 PAROXYSMAL ATRIAL FIBRILLATION (HCC): ICD-10-CM

## 2017-09-21 DIAGNOSIS — E78.2 MIXED HYPERLIPIDEMIA: ICD-10-CM

## 2017-09-21 DIAGNOSIS — I25.10 CORONARY ARTERY DISEASE INVOLVING NATIVE CORONARY ARTERY OF NATIVE HEART WITHOUT ANGINA PECTORIS: Primary | ICD-10-CM

## 2017-09-21 DIAGNOSIS — I49.3 PVC (PREMATURE VENTRICULAR CONTRACTION): ICD-10-CM

## 2017-09-21 DIAGNOSIS — I48.0 PAROXYSMAL ATRIAL FIBRILLATION (HCC): Primary | ICD-10-CM

## 2017-09-21 DIAGNOSIS — I10 ESSENTIAL HYPERTENSION: ICD-10-CM

## 2017-09-21 DIAGNOSIS — Z79.01 LONG TERM (CURRENT) USE OF ANTICOAGULANTS: ICD-10-CM

## 2017-09-21 LAB
INR BLD: NORMAL
INR, EXTERNAL: 2.7 (ref 2–3)
PT POC: NORMAL SECONDS
VALID INTERNAL CONTROL?: YES

## 2017-09-21 PROCEDURE — 80053 COMPREHEN METABOLIC PANEL: CPT

## 2017-09-21 PROCEDURE — 80061 LIPID PANEL: CPT

## 2017-09-21 PROCEDURE — 36415 COLL VENOUS BLD VENIPUNCTURE: CPT

## 2017-09-21 NOTE — MR AVS SNAPSHOT
Visit Information Date & Time Provider Department Dept. Phone Encounter #  
 9/21/2017  9:40 AM Rebbeca Lesch, MD [facility] Susan Conner [phone] [id number] Follow-up Instructions Return in about 6 months (around 3/21/2018). Your Appointments 9/21/2017 10:00 AM  
COUMADIN CLINIC with YOVANA MCGOVERN [facility] ([facility] SCHEDULING) Appt Note: 1mo check & Benjie @9:40  
 [address] [place] [address]  
[address] [address] Upcoming Health Maintenance Date Due Hepatitis C Screening 1950 FOOT EXAM Q1 3/3/1959 MICROALBUMIN Q1 3/3/1959 EYE EXAM RETINAL OR DILATED Q1 3/3/1959 DTaP/Tdap/Td series (1 - Tdap) 3/3/1970 FOBT Q 1 YEAR AGE 50-75 3/3/1999 ZOSTER VACCINE AGE 60> 1/3/2009 GLAUCOMA SCREENING Q2Y 3/3/2014 Pneumococcal 65+ Low/Medium Risk (1 of 2 - PCV13) 3/3/2014 MEDICARE YEARLY EXAM 3/3/2014 HEMOGLOBIN A1C Q6M 11/16/2014 INFLUENZA AGE 9 TO ADULT 8/1/2017 LIPID PANEL Q1 2/23/2018 Allergies as of 9/21/2017  Review Complete On: 9/21/2017 By: Rebbeca Lesch, MD  
  
 Severity Noted Reaction Type Reactions Enalapril  09/03/2015    Cough Hydrochlorothiazide  07/21/2014   Side Effect Other (comments) Weakness, fatigue, very sick, extreme weight loss Current Immunizations  Never Reviewed No immunizations on file. Not reviewed this visit You Were Diagnosed With   
  
 Codes Comments Coronary artery disease involving native coronary artery of native heart without angina pectoris    -  Primary ICD-10-CM: I25.10 ICD-9-CM: 414.01 Paroxysmal atrial fibrillation (HCC)     ICD-10-CM: I48.0 ICD-9-CM: 427.31 PVC (premature ventricular contraction)     ICD-10-CM: I49.3 ICD-9-CM: 427.69 Essential hypertension     ICD-10-CM: I10 
ICD-9-CM: 401.9 Mixed hyperlipidemia     ICD-10-CM: E78.2 ICD-9-CM: 272.2 Vitals BP Pulse Resp Height(growth percentile) Weight(growth percentile) SpO2  
 130/82 (BP 1 Location: Right arm, BP Patient Position: Sitting) 89 14 5' 10\" (1.778 m) 241 lb 6.4 oz (109.5 kg) 98% BMI Smoking Status 34.64 kg/m2 Former Smoker Vitals History BMI and BSA Data Body Mass Index Body Surface Area  
 34.64 kg/m 2 2.33 m 2 Preferred Pharmacy Pharmacy Name Phone [facility], [address] [facility], [address] [phone] Your Updated Medication List  
  
   
This list is accurate as of: 9/21/17  9:49 AM.  Always use your most recent med list.  
  
  
  
  
 atorvastatin 40 mg tablet Commonly known as:  LIPITOR Take 1 Tab by mouth nightly. carvedilol 12.5 mg tablet Commonly known as:  Kristina Finder Take 1 Tab by mouth two (2) times daily (with meals). metFORMIN 500 mg tablet Commonly known as:  GLUCOPHAGE Take  by mouth two (2) times daily (with meals). PRILOSEC PO Take 20 mg by mouth every other day. warfarin 5 mg tablet Commonly known as:  COUMADIN Take 1 Tab by mouth daily. Or take as directed by coumadin clinic nurse Follow-up Instructions Return in about 6 months (around 3/21/2018). Introducing [facility]! Dear James Handley: Thank you for requesting a [employer] account. Our records indicate that you already have an active [employer] account. You can access your account anytime at [email or web address] Did you know that you can access your hospital and ER discharge instructions at any time in [employer]? You can also review all of your test results from your hospital stay or ER visit. Additional Information If you have questions, please visit the Frequently Asked Questions section of the Cipher[employer] website at [email or web address] Remember, MyChart is NOT to be used for urgent needs. For medical emergencies, dial 911. Now available from your iPhone and Android! Please provide this summary of care documentation to your next provider. Your primary care clinician is listed as Nina Osorio. If you have any questions after today's visit, please call [phone].

## 2017-09-21 NOTE — LETTER
9/25/2017 1:28 PM 
 
Mr. Eliecer Michaels [address] [facility] [address] Dear Adrienne Price English: 
 
Please find your most recent results below. Resulted Orders METABOLIC PANEL, COMPREHENSIVE Result Value Ref Range Glucose 131 (H) 65 - 99 mg/dL BUN 16 8 - 27 mg/dL Creatinine 0.73 (L) 0.76 - 1.27 mg/dL GFR est non-AA 95 >59 mL/min/1.73 GFR est  >59 mL/min/1.73  
 BUN/Creatinine ratio 22 10 - 24 Sodium 140 134 - 144 mmol/L Potassium 4.8 3.5 - 5.2 mmol/L Chloride 102 96 - 106 mmol/L  
 CO2 22 18 - 29 mmol/L Calcium 9.3 8.6 - 10.2 mg/dL Protein, total 7.3 6.0 - 8.5 g/dL Albumin 4.5 3.6 - 4.8 g/dL GLOBULIN, TOTAL 2.8 1.5 - 4.5 g/dL A-G Ratio 1.6 1.2 - 2.2 Bilirubin, total 0.8 0.0 - 1.2 mg/dL Alk. phosphatase 70 39 - 117 IU/L  
 AST (SGOT) 16 0 - 40 IU/L  
 ALT (SGPT) 18 0 - 44 IU/L Narrative Performed at:  [address]  [id number] : Angelia Bryant MD, Phone:  [phone] LIPID PANEL Result Value Ref Range Cholesterol, total 161 100 - 199 mg/dL Triglyceride 171 (H) 0 - 149 mg/dL HDL Cholesterol 34 (L) >39 mg/dL VLDL, calculated 34 5 - 40 mg/dL LDL, calculated 93 0 - 99 mg/dL Narrative Performed at:  [address]  [id number] : Angelia Bryant MD, Phone:  [phone] CVD REPORT Result Value Ref Range INTERPRETATION Note Comment:  
   Supplement report is available. Narrative Performed at:  [address] [place] [address]  [id number] : Karla Rasmussen PhD, Phone:  [phone] RECOMMENDATIONS: Your cholesterol is good. Your triglyerides and glucose are slightlly elevated. Continue to work on your diet and weight. Stay on medications and repeat in 6 months. Please call me if you have any questions: [phone] Sincerely, MD Pattie Smith., RN

## 2017-09-21 NOTE — PROGRESS NOTES
HISTORY OF PRESENT ILLNESS  Lesley Watkins is a 75 y.o. male     SUMMARY:   Problem List  Date Reviewed: 9/21/2017          Codes Class Noted    Paroxysmal atrial fibrillation (HCC) ICD-10-CM: I48.0  ICD-9-CM: 427.31  3/7/2017        Type 2 diabetes mellitus with hyperglycemia, without long-term current use of insulin (HCC) ICD-10-CM: E11.65  ICD-9-CM: 250.00, 790.29  2/23/2017        PVC (premature ventricular contraction) ICD-10-CM: I49.3  ICD-9-CM: 427.69  9/4/2014        HTN (hypertension) ICD-10-CM: I10  ICD-9-CM: 401.9  7/21/2014        Hyperlipidemia ICD-10-CM: E78.5  ICD-9-CM: 272.4  7/21/2014        CAD (coronary artery disease) ICD-10-CM: I25.10  ICD-9-CM: 414.00  7/21/2014    Overview Addendum 9/4/2014 11:55 AM by Elena Mixon MD     12/12 abnormal stress test chippenham, then bms to ramus, mod distal disease, normal lvef  60-70% distal pda and lad. Current Outpatient Prescriptions on File Prior to Visit   Medication Sig    atorvastatin (LIPITOR) 40 mg tablet Take 1 Tab by mouth nightly.  carvedilol (COREG) 12.5 mg tablet Take 1 Tab by mouth two (2) times daily (with meals).  warfarin (COUMADIN) 5 mg tablet Take 1 Tab by mouth daily. Or take as directed by coumadin clinic nurse    metFORMIN (GLUCOPHAGE) 500 mg tablet Take  by mouth two (2) times daily (with meals).  OMEPRAZOLE (PRILOSEC PO) Take 20 mg by mouth every other day. No current facility-administered medications on file prior to visit. CARDIOLOGY STUDIES TO DATE:  12/12 exercise cardiolyte apical ischemia, lvef 61%  12/12 normal echo       Chief Complaint   Patient presents with    Irregular Heart Beat     HPI :  Mr. Marroquin is doing well. No problem with his medications. He is trying to stay active, though he has put on a little weight. His INR today was 2.7. He has not had his lipids checked since 02/2017.       CARDIAC ROS:   negative for chest pain, dyspnea, palpitations, syncope, orthopnea, paroxysmal nocturnal dyspnea, exertional chest pressure/discomfort, claudication, lower extremity edema    Family History   Problem Relation Age of Onset    Heart Disease Father        Past Medical History:   Diagnosis Date    Diverticulosis     Duodenal ulcer        GENERAL ROS:  A comprehensive review of systems was negative except for that written in the HPI.     Visit Vitals    /82 (BP 1 Location: Right arm, BP Patient Position: Sitting)    Pulse 89    Resp 14    Ht 5' 10\" (1.778 m)    Wt 241 lb 6.4 oz (109.5 kg)    SpO2 98%    BMI 34.64 kg/m2       Wt Readings from Last 3 Encounters:   09/21/17 241 lb 6.4 oz (109.5 kg)   03/23/17 232 lb 12.8 oz (105.6 kg)   02/23/17 232 lb (105.2 kg)            BP Readings from Last 3 Encounters:   09/21/17 130/82   03/23/17 110/82   02/23/17 140/90       PHYSICAL EXAM  General appearance: alert, cooperative, no distress, appears stated age  Neck: supple, symmetrical, trachea midline, no adenopathy, no carotid bruit and no JVD  Lungs: clear to auscultation bilaterally  Heart:irregular rate and rhythm, S1, S2 normal, no murmur, click, rub or gallop  Extremities: extremities normal, atraumatic, no cyanosis or edema    Lab Results   Component Value Date/Time    Cholesterol, total 148 02/23/2017 11:14 AM    Cholesterol, total 151 08/25/2016 10:37 AM    Cholesterol, total 141 03/03/2016 11:42 AM    Cholesterol, total 164 09/03/2015 11:46 AM    Cholesterol, total 185 03/05/2015 11:14 AM    HDL Cholesterol 31 02/23/2017 11:14 AM    HDL Cholesterol 29 08/25/2016 10:37 AM    HDL Cholesterol 28 03/03/2016 11:42 AM    HDL Cholesterol 32 09/03/2015 11:46 AM    HDL Cholesterol 32 03/05/2015 11:14 AM    LDL, calculated 84 02/23/2017 11:14 AM    LDL, calculated 93 08/25/2016 10:37 AM    LDL, calculated 91 03/03/2016 11:42 AM    LDL, calculated 102 09/03/2015 11:46 AM    LDL, calculated 116 03/05/2015 11:14 AM    Triglyceride 166 02/23/2017 11:14 AM    Triglyceride 145 08/25/2016 10:37 AM    Triglyceride 111 03/03/2016 11:42 AM    Triglyceride 152 09/03/2015 11:46 AM    Triglyceride 185 03/05/2015 11:14 AM     ASSESSMENT  Mr. Marroquin is stable, asymptomatic and well compensated on a good medical regimen and needs no cardiac testing at this time. current treatment plan is effective, no change in therapy  lab results and schedule of future lab studies reviewed with patient  reviewed diet, exercise and weight control    Encounter Diagnoses   Name Primary?  Coronary artery disease involving native coronary artery of native heart without angina pectoris Yes    Paroxysmal atrial fibrillation (HCC)     PVC (premature ventricular contraction)     Essential hypertension     Mixed hyperlipidemia      Orders Placed This Encounter    LIPID PANEL    METABOLIC PANEL, COMPREHENSIVE       Follow-up Disposition:  Return in about 6 months (around 3/21/2018).     Shahbaz Cerda MD  9/21/2017

## 2017-09-22 LAB
ALBUMIN SERPL-MCNC: 4.5 G/DL (ref 3.6–4.8)
ALBUMIN/GLOB SERPL: 1.6 {RATIO} (ref 1.2–2.2)
ALP SERPL-CCNC: 70 IU/L (ref 39–117)
ALT SERPL-CCNC: 18 IU/L (ref 0–44)
AST SERPL-CCNC: 16 IU/L (ref 0–40)
BILIRUB SERPL-MCNC: 0.8 MG/DL (ref 0–1.2)
BUN SERPL-MCNC: 16 MG/DL (ref 8–27)
BUN/CREAT SERPL: 22 (ref 10–24)
CALCIUM SERPL-MCNC: 9.3 MG/DL (ref 8.6–10.2)
CHLORIDE SERPL-SCNC: 102 MMOL/L (ref 96–106)
CHOLEST SERPL-MCNC: 161 MG/DL (ref 100–199)
CO2 SERPL-SCNC: 22 MMOL/L (ref 18–29)
CREAT SERPL-MCNC: 0.73 MG/DL (ref 0.76–1.27)
GLOBULIN SER CALC-MCNC: 2.8 G/DL (ref 1.5–4.5)
GLUCOSE SERPL-MCNC: 131 MG/DL (ref 65–99)
HDLC SERPL-MCNC: 34 MG/DL
INTERPRETATION, 910389: NORMAL
LDLC SERPL CALC-MCNC: 93 MG/DL (ref 0–99)
POTASSIUM SERPL-SCNC: 4.8 MMOL/L (ref 3.5–5.2)
PROT SERPL-MCNC: 7.3 G/DL (ref 6–8.5)
SODIUM SERPL-SCNC: 140 MMOL/L (ref 134–144)
TRIGL SERPL-MCNC: 171 MG/DL (ref 0–149)
VLDLC SERPL CALC-MCNC: 34 MG/DL (ref 5–40)

## 2017-09-25 NOTE — PROGRESS NOTES
Chol is good. triglyerides and glucose slightlly elevated. Continue to work on diet and wt.  Stay on meds and repeat 6mos

## 2017-10-26 ENCOUNTER — CLINICAL SUPPORT (OUTPATIENT)
Dept: CARDIOLOGY CLINIC | Age: 67
End: 2017-10-26

## 2017-10-26 DIAGNOSIS — Z79.01 LONG-TERM (CURRENT) USE OF ANTICOAGULANTS: Primary | ICD-10-CM

## 2017-10-26 DIAGNOSIS — I48.0 PAROXYSMAL ATRIAL FIBRILLATION (HCC): ICD-10-CM

## 2017-10-26 LAB
INR BLD: NORMAL
INR, EXTERNAL: 2.4 (ref 2–3)
PT POC: NORMAL SECONDS
VALID INTERNAL CONTROL?: YES

## 2017-11-30 ENCOUNTER — CLINICAL SUPPORT (OUTPATIENT)
Dept: CARDIOLOGY CLINIC | Age: 67
End: 2017-11-30

## 2017-11-30 DIAGNOSIS — Z79.01 LONG-TERM (CURRENT) USE OF ANTICOAGULANTS: Primary | ICD-10-CM

## 2017-11-30 DIAGNOSIS — I48.0 PAROXYSMAL ATRIAL FIBRILLATION (HCC): ICD-10-CM

## 2017-11-30 LAB
INR BLD: NORMAL
INR, EXTERNAL: 2.6 (ref 2–3)
PT POC: NORMAL SECONDS
VALID INTERNAL CONTROL?: YES

## 2018-01-05 ENCOUNTER — TELEPHONE (OUTPATIENT)
Dept: CARDIOLOGY CLINIC | Age: 68
End: 2018-01-05

## 2018-02-14 ENCOUNTER — TELEPHONE (OUTPATIENT)
Dept: CARDIOLOGY CLINIC | Age: 68
End: 2018-02-14

## 2018-02-14 DIAGNOSIS — I25.10 CORONARY ARTERY DISEASE INVOLVING NATIVE CORONARY ARTERY OF NATIVE HEART WITHOUT ANGINA PECTORIS: ICD-10-CM

## 2018-02-14 RX ORDER — WARFARIN SODIUM 5 MG/1
5 TABLET ORAL DAILY
Qty: 60 TAB | Refills: 1 | Status: SHIPPED | OUTPATIENT
Start: 2018-02-14 | End: 2018-04-26 | Stop reason: SDUPTHER

## 2018-02-14 NOTE — TELEPHONE ENCOUNTER
Requested Prescriptions     Signed Prescriptions Disp Refills    warfarin (COUMADIN) 5 mg tablet 60 Tab 1     Sig: Take 1 Tab by mouth daily. Taking 2 tablets (10 mg) on Thursdays/Saturdays and 1 1/2 tablets (7.5 mg) all other days.      Authorizing Provider: Jewel Vital     Ordering User: Corbin Rincon    Per Dr. Soraida Hendrix verbal orders

## 2018-02-14 NOTE — TELEPHONE ENCOUNTER
Pharmacy verified. Pt is out of medication. Requested Prescriptions     Pending Prescriptions Disp Refills    warfarin (COUMADIN) 5 mg tablet 60 Tab 5     Sig: Take 1 Tab by mouth daily. Or take as directed by coumadin clinic nurse     Thanks!   Etienne Delgado

## 2018-03-22 ENCOUNTER — CLINICAL SUPPORT (OUTPATIENT)
Dept: CARDIOLOGY CLINIC | Age: 68
End: 2018-03-22

## 2018-03-22 ENCOUNTER — OFFICE VISIT (OUTPATIENT)
Dept: CARDIOLOGY CLINIC | Age: 68
End: 2018-03-22

## 2018-03-22 VITALS
HEART RATE: 96 BPM | BODY MASS INDEX: 34.65 KG/M2 | OXYGEN SATURATION: 99 % | RESPIRATION RATE: 20 BRPM | HEIGHT: 70 IN | WEIGHT: 242 LBS | DIASTOLIC BLOOD PRESSURE: 78 MMHG | SYSTOLIC BLOOD PRESSURE: 122 MMHG

## 2018-03-22 DIAGNOSIS — I48.0 PAROXYSMAL ATRIAL FIBRILLATION (HCC): ICD-10-CM

## 2018-03-22 DIAGNOSIS — I48.0 PAROXYSMAL ATRIAL FIBRILLATION (HCC): Primary | ICD-10-CM

## 2018-03-22 DIAGNOSIS — I25.10 CORONARY ARTERY DISEASE INVOLVING NATIVE CORONARY ARTERY OF NATIVE HEART WITHOUT ANGINA PECTORIS: ICD-10-CM

## 2018-03-22 DIAGNOSIS — E78.2 MIXED HYPERLIPIDEMIA: ICD-10-CM

## 2018-03-22 DIAGNOSIS — I10 ESSENTIAL HYPERTENSION: ICD-10-CM

## 2018-03-22 DIAGNOSIS — I49.3 PVC (PREMATURE VENTRICULAR CONTRACTION): ICD-10-CM

## 2018-03-22 DIAGNOSIS — Z79.01 LONG-TERM (CURRENT) USE OF ANTICOAGULANTS: Primary | ICD-10-CM

## 2018-03-22 LAB
INR BLD: NORMAL
INR, EXTERNAL: 2.7 (ref 2–3)
PT POC: NORMAL SECONDS
VALID INTERNAL CONTROL?: YES

## 2018-03-22 RX ORDER — OMEPRAZOLE 20 MG/1
20 CAPSULE, DELAYED RELEASE ORAL DAILY
COMMUNITY

## 2018-03-22 NOTE — PROGRESS NOTES
A full discussion of the nature of anticoagulants has been carried out. A benefit risk analysis has been presented to the patient, so that they understand the justification for choosing anticoagulation at this time. The need for frequent and regular monitoring, precise dosage adjustment and compliance is stressed. Side effects of potential bleeding are discussed. The patient should avoid any OTC items containing aspirin or ibuprofen, and should avoid great swings in general diet. Avoid alcohol consumption. Call if any signs of abnormal bleeding. Next PT/INR test in  1 month. Office visit with Dr. David Claros today. Discussed the need for monthly INR checks. Patient lives part time at [facility]. To possibly have labs checked at [facility] at [address] , [address]; [facility], [address] [place] [address] when not in [place]. Phone [phone] Fax [phone]. No change in coumadin dosing.

## 2018-03-22 NOTE — MR AVS SNAPSHOT
[address] [facility] [address] [place] 
[phone] Patient: Nieves Frazier MRN: [id number] Visit Information Date & Time Provider Department Dept. Phone Encounter #  
 3/22/2018 10:00 AM Nery Finley MD [facility] Neil Cortes [phone] [id number] Follow-up Instructions Return in about 6 months (around 9/22/2018). Your Appointments 3/22/2018 10:20 AM  
COUMADIN CLINIC with YOVANA MCGOVERN [facility] ([facility] SCHEDULING) Appt Note: add on INR  
 [address] [facility] [address] [place] [address]  
[address] [place] [facility] [address] Upcoming Health Maintenance Date Due Hepatitis C Screening 1950 FOOT EXAM Q1 3/3/1959 MICROALBUMIN Q1 3/3/1959 EYE EXAM RETINAL OR DILATED Q1 3/3/1959 DTaP/Tdap/Td series (1 - Tdap) 3/3/1970 FOBT Q 1 YEAR AGE 50-75 3/3/1999 ZOSTER VACCINE AGE 60> 1/3/2009 GLAUCOMA SCREENING Q2Y 3/3/2014 Pneumococcal 65+ Low/Medium Risk (1 of 2 - PCV13) 3/3/2014 HEMOGLOBIN A1C Q6M 11/16/2014 Influenza Age 5 to Adult 8/1/2017 MEDICARE YEARLY EXAM 3/14/2018 LIPID PANEL Q1 9/21/2018 Allergies as of 3/22/2018  Review Complete On: 3/22/2018 By: Nery Finley MD  
  
 Severity Noted Reaction Type Reactions Enalapril  09/03/2015    Cough Hydrochlorothiazide  07/21/2014   Side Effect Other (comments) Weakness, fatigue, very sick, extreme weight loss Current Immunizations  Never Reviewed No immunizations on file. Not reviewed this visit You Were Diagnosed With   
  
 Codes Comments Paroxysmal atrial fibrillation (HCC)    -  Primary ICD-10-CM: I48.0 ICD-9-CM: 427.31 PVC (premature ventricular contraction)     ICD-10-CM: I49.3 ICD-9-CM: 427.69 Essential hypertension     ICD-10-CM: I10 
ICD-9-CM: 401.9 Mixed hyperlipidemia     ICD-10-CM: E78.2 ICD-9-CM: 272.2 Coronary artery disease involving native coronary artery of native heart without angina pectoris     ICD-10-CM: I25.10 ICD-9-CM: 414.01 Vitals BP Pulse Resp Height(growth percentile) Weight(growth percentile) SpO2  
 122/78 (BP 1 Location: Left arm) 96 20 5' 10\" (1.778 m) 242 lb (109.8 kg) 99% BMI Smoking Status 34.72 kg/m2 Former Smoker Vitals History BMI and BSA Data Body Mass Index Body Surface Area 34.72 kg/m 2 2.33 m 2 Preferred Pharmacy Pharmacy Name Phone [facility], [address] [place] [address] [address] [facility] [phone] Your Updated Medication List  
  
   
This list is accurate as of 3/22/18 10:15 AM.  Always use your most recent med list.  
  
  
  
  
 atorvastatin 40 mg tablet Commonly known as:  LIPITOR Take 1 Tab by mouth nightly. carvedilol 12.5 mg tablet Commonly known as:  Zhang Dine Take 1 Tab by mouth two (2) times daily (with meals). metFORMIN 500 mg tablet Commonly known as:  GLUCOPHAGE Take  by mouth two (2) times daily (with meals). PriLOSEC 20 mg capsule Generic drug:  omeprazole Take 20 mg by mouth every other day. warfarin 5 mg tablet Commonly known as:  COUMADIN Take 1 Tab by mouth daily. Taking 2 tablets (10 mg) on Thursdays/Saturdays and 1 1/2 tablets (7.5 mg) all other days. Follow-up Instructions Return in about 6 months (around 9/22/2018). Introducing [facility]! Dear Dwayne Brown: Thank you for requesting a StellaService account. Our records indicate that you already have an active St[employer]Service account. You can access your account anytime at [email or web address] Did you know that you can access your hospital and ER discharge instructions at any time in StellaService? You can also review all of your test results from your hospital stay or ER visit. Additional Information If you have questions, please visit the Frequently Asked Questions section of the [employer] website at [email or web address] Remember, [employer] is NOT to be used for urgent needs. For medical emergencies, dial 911. Now available from your iPhone and Android! Please provide this summary of care documentation to your next provider. Your primary care clinician is listed as Hale Hodgkin. If you have any questions after today's visit, please call [phone].

## 2018-03-22 NOTE — PROGRESS NOTES
HISTORY OF PRESENT ILLNESS  Linnea Beltre is a 70 y.o. male     SUMMARY:   Problem List  Date Reviewed: 3/22/2018          Codes Class Noted    Paroxysmal atrial fibrillation (HCC) ICD-10-CM: I48.0  ICD-9-CM: 427.31  3/7/2017        Type 2 diabetes mellitus with hyperglycemia, without long-term current use of insulin (HCC) ICD-10-CM: E11.65  ICD-9-CM: 250.00, 790.29  2/23/2017        PVC (premature ventricular contraction) ICD-10-CM: I49.3  ICD-9-CM: 427.69  9/4/2014        HTN (hypertension) ICD-10-CM: I10  ICD-9-CM: 401.9  7/21/2014        Hyperlipidemia ICD-10-CM: E78.5  ICD-9-CM: 272.4  7/21/2014        CAD (coronary artery disease) ICD-10-CM: I25.10  ICD-9-CM: 414.00  7/21/2014    Overview Addendum 9/4/2014 11:55 AM by Yvette Abrams MD     12/12 abnormal stress test chippenham, then bms to ramus, mod distal disease, normal lvef  60-70% distal pda and lad. Current Outpatient Prescriptions on File Prior to Visit   Medication Sig    warfarin (COUMADIN) 5 mg tablet Take 1 Tab by mouth daily. Taking 2 tablets (10 mg) on Thursdays/Saturdays and 1 1/2 tablets (7.5 mg) all other days.  atorvastatin (LIPITOR) 40 mg tablet Take 1 Tab by mouth nightly.  carvedilol (COREG) 12.5 mg tablet Take 1 Tab by mouth two (2) times daily (with meals).  metFORMIN (GLUCOPHAGE) 500 mg tablet Take  by mouth two (2) times daily (with meals). No current facility-administered medications on file prior to visit. CARDIOLOGY STUDIES TO DATE:  12/12 exercise cardiolyte apical ischemia, lvef 61%  12/12 normal echo       Chief Complaint   Patient presents with    Irregular Heart Beat     HPI :  Mr. Marroquin is doing well. He and his wife bought a very small condo in [facility] and they spent three months there over the winter and hopefully they will be spending more time there going forward. He walked a lot on the beach while he was there with no worrisome cardiac symptoms.   He has neither lost nor gained weight. He recently saw his primary care physician who checked his lipids and hemoglobin A1C. CARDIAC ROS:   negative for chest pain, dyspnea, palpitations, syncope, orthopnea, paroxysmal nocturnal dyspnea, exertional chest pressure/discomfort, claudication, lower extremity edema    Family History   Problem Relation Age of Onset    Heart Disease Father        Past Medical History:   Diagnosis Date    Diverticulosis     Duodenal ulcer        GENERAL ROS:  A comprehensive review of systems was negative except for that written in the HPI.     Visit Vitals    /78 (BP 1 Location: Left arm)    Pulse 96    Resp 20    Ht 5' 10\" (1.778 m)    Wt 242 lb (109.8 kg)    SpO2 99%    BMI 34.72 kg/m2       Wt Readings from Last 3 Encounters:   03/22/18 242 lb (109.8 kg)   09/21/17 241 lb 6.4 oz (109.5 kg)   03/23/17 232 lb 12.8 oz (105.6 kg)            BP Readings from Last 3 Encounters:   03/22/18 122/78   09/21/17 130/82   03/23/17 110/82       PHYSICAL EXAM  General appearance: alert, cooperative, no distress, appears stated age  Neck: supple, symmetrical, trachea midline, no adenopathy, no carotid bruit and no JVD  Lungs: clear to auscultation bilaterally  Heart: irregularly irregular rhythm, S1, S2 normal, no S3 or S4  Extremities: extremities normal, atraumatic, no cyanosis or edema    Lab Results   Component Value Date/Time    Cholesterol, total 161 09/21/2017 10:14 AM    Cholesterol, total 148 02/23/2017 11:14 AM    Cholesterol, total 151 08/25/2016 10:37 AM    Cholesterol, total 141 03/03/2016 11:42 AM    Cholesterol, total 164 09/03/2015 11:46 AM    HDL Cholesterol 34 (L) 09/21/2017 10:14 AM    HDL Cholesterol 31 (L) 02/23/2017 11:14 AM    HDL Cholesterol 29 (L) 08/25/2016 10:37 AM    HDL Cholesterol 28 (L) 03/03/2016 11:42 AM    HDL Cholesterol 32 (L) 09/03/2015 11:46 AM    LDL, calculated 93 09/21/2017 10:14 AM    LDL, calculated 84 02/23/2017 11:14 AM    LDL, calculated 93 08/25/2016 10:37 AM    LDL, calculated 91 03/03/2016 11:42 AM    LDL, calculated 102 (H) 09/03/2015 11:46 AM    Triglyceride 171 (H) 09/21/2017 10:14 AM    Triglyceride 166 (H) 02/23/2017 11:14 AM    Triglyceride 145 08/25/2016 10:37 AM    Triglyceride 111 03/03/2016 11:42 AM    Triglyceride 152 (H) 09/03/2015 11:46 AM     ASSESSMENT  Mr. Isabel Gill is stable and asymptomatic, well compensated on a good medical regimen and needs no cardiac testing at this time. We will track down his recent lab results. current treatment plan is effective, no change in therapy  lab results and schedule of future lab studies reviewed with patient  reviewed diet, exercise and weight control    Encounter Diagnoses   Name Primary?  Paroxysmal atrial fibrillation (HCC) Yes    PVC (premature ventricular contraction)     Essential hypertension     Mixed hyperlipidemia     Coronary artery disease involving native coronary artery of native heart without angina pectoris      Orders Placed This Encounter    omeprazole (PRILOSEC) 20 mg capsule       Follow-up Disposition:  Return in about 6 months (around 9/22/2018).     Sukhjinder Queen MD  3/22/2018

## 2018-04-23 ENCOUNTER — CLINICAL SUPPORT (OUTPATIENT)
Dept: CARDIOLOGY CLINIC | Age: 68
End: 2018-04-23

## 2018-04-23 DIAGNOSIS — I48.0 PAROXYSMAL ATRIAL FIBRILLATION (HCC): ICD-10-CM

## 2018-04-23 DIAGNOSIS — Z79.01 LONG TERM (CURRENT) USE OF ANTICOAGULANTS: Primary | ICD-10-CM

## 2018-04-23 LAB
INR BLD: NORMAL
INR, EXTERNAL: 3.5
PT POC: NORMAL SECONDS
VALID INTERNAL CONTROL?: YES

## 2018-04-23 NOTE — PROGRESS NOTES
A full discussion of the nature of anticoagulants has been carried out. A benefit risk analysis has been presented to the patient, so that they understand the justification for choosing anticoagulation at this time. The need for frequent and regular monitoring, precise dosage adjustment and compliance is stressed. Side effects of potential bleeding are discussed. The patient should avoid any OTC items containing aspirin or ibuprofen, and should avoid great swings in general diet. Avoid alcohol consumption. Call if any signs of abnormal bleeding. Next PT/INR test in 2 weeks  Pt INR is not therapeutic. Pt to decrease dose to 5 mg today and increase Vit K containing foods. Then take 10 mg on Thursday and Saturday and 7.5 mg all other days.

## 2018-04-26 DIAGNOSIS — I25.10 CORONARY ARTERY DISEASE INVOLVING NATIVE CORONARY ARTERY OF NATIVE HEART WITHOUT ANGINA PECTORIS: ICD-10-CM

## 2018-04-26 RX ORDER — WARFARIN SODIUM 5 MG/1
TABLET ORAL
Qty: 60 TAB | Refills: 5 | Status: SHIPPED | OUTPATIENT
Start: 2018-04-26 | End: 2018-10-15 | Stop reason: SDUPTHER

## 2018-04-26 NOTE — TELEPHONE ENCOUNTER
Requested Prescriptions     Signed Prescriptions Disp Refills    warfarin (COUMADIN) 5 mg tablet 60 Tab 5     Sig: TAKING 2 TABLETS (10 MG) ON THURSDAYS/SATURDAYS AND THEN TAKE 1 & 1/2 TABLET (7.5 MG) ON ALL OTHER DAYS (NEED APPOINTMENT FOR AN INR CHECK)     Authorizing Provider: Meryl Pennington     Ordering User: Brenda Banks    Per Dr. Tammy Solomon verbal orders

## 2018-05-07 ENCOUNTER — CLINICAL SUPPORT (OUTPATIENT)
Dept: CARDIOLOGY CLINIC | Age: 68
End: 2018-05-07

## 2018-05-07 DIAGNOSIS — I48.0 PAROXYSMAL ATRIAL FIBRILLATION (HCC): ICD-10-CM

## 2018-05-07 DIAGNOSIS — Z79.01 LONG TERM (CURRENT) USE OF ANTICOAGULANTS: Primary | ICD-10-CM

## 2018-05-07 LAB
INR BLD: NORMAL
INR, EXTERNAL: 2.7
PT POC: NORMAL SECONDS
VALID INTERNAL CONTROL?: YES

## 2018-05-07 NOTE — PROGRESS NOTES
A full discussion of the nature of anticoagulants has been carried out. A benefit risk analysis has been presented to the patient, so that they understand the justification for choosing anticoagulation at this time. The need for frequent and regular monitoring, precise dosage adjustment and compliance is stressed. Side effects of potential bleeding are discussed. The patient should avoid any OTC items containing aspirin or ibuprofen, and should avoid great swings in general diet. Avoid alcohol consumption. Call if any signs of abnormal bleeding. Next PT/INR test in 3 weeks. The INR is stable and therapeutic. Continue same dose of coumadin and recheck in 3 weeks.

## 2018-05-31 ENCOUNTER — CLINICAL SUPPORT (OUTPATIENT)
Dept: CARDIOLOGY CLINIC | Age: 68
End: 2018-05-31

## 2018-05-31 DIAGNOSIS — I48.0 PAROXYSMAL ATRIAL FIBRILLATION (HCC): ICD-10-CM

## 2018-05-31 DIAGNOSIS — Z79.01 LONG TERM (CURRENT) USE OF ANTICOAGULANTS: Primary | ICD-10-CM

## 2018-05-31 LAB
INR BLD: NORMAL
INR, EXTERNAL: 2.3 (ref 2–3)
PT POC: NORMAL SECONDS
VALID INTERNAL CONTROL?: YES

## 2018-05-31 NOTE — PROGRESS NOTES
A full discussion of the nature of anticoagulants has been carried out. A benefit risk analysis has been presented to the patient, so that they understand the justification for choosing anticoagulation at this time. The need for frequent and regular monitoring, precise dosage adjustment and compliance is stressed. Side effects of potential bleeding are discussed. The patient should avoid any OTC items containing aspirin or ibuprofen, and should avoid great swings in general diet. Avoid alcohol consumption. Call if any signs of abnormal bleeding. Next PT/INR test in 4 weeks. Patient had been taking 7.5 mg daily except on Mondays, taking 5 mg and Tuesdays and Thursdays, taking 10 mg. Updated dosing regimen to 7.5 mg daily except on Thursdays, take 10 mg. No change in mg/week. Patient verbalized understanding.

## 2018-06-29 ENCOUNTER — CLINICAL SUPPORT (OUTPATIENT)
Dept: CARDIOLOGY CLINIC | Age: 68
End: 2018-06-29

## 2018-06-29 DIAGNOSIS — Z79.01 LONG TERM (CURRENT) USE OF ANTICOAGULANTS: Primary | ICD-10-CM

## 2018-06-29 DIAGNOSIS — I48.0 PAROXYSMAL ATRIAL FIBRILLATION (HCC): ICD-10-CM

## 2018-06-29 LAB
INR BLD: NORMAL
INR, EXTERNAL: 2.1
PT POC: NORMAL SECONDS
VALID INTERNAL CONTROL?: YES

## 2018-08-03 ENCOUNTER — CLINICAL SUPPORT (OUTPATIENT)
Dept: CARDIOLOGY CLINIC | Age: 68
End: 2018-08-03

## 2018-08-03 DIAGNOSIS — I48.0 PAROXYSMAL ATRIAL FIBRILLATION (HCC): ICD-10-CM

## 2018-08-03 DIAGNOSIS — Z79.01 LONG TERM (CURRENT) USE OF ANTICOAGULANTS: Primary | ICD-10-CM

## 2018-08-03 LAB
INR BLD: NORMAL
INR, EXTERNAL: 3.5
PT POC: NORMAL SECONDS
VALID INTERNAL CONTROL?: YES

## 2018-08-03 NOTE — PROGRESS NOTES
A full discussion of the nature of anticoagulants has been carried out. A benefit risk analysis has been presented to the patient, so that they understand the justification for choosing anticoagulation at this time. The need for frequent and regular monitoring, precise dosage adjustment and compliance is stressed. Side effects of potential bleeding are discussed. The patient should avoid any OTC items containing aspirin or ibuprofen, and should avoid great swings in general diet. Avoid alcohol consumption. Call if any signs of abnormal bleeding. Next PT/INR test in 2-3 weeks. Mr. Layla Bedolla reports having eaten more vegetables including Vit K containing due to trying to lose weight. Denies extra doses, alcohol intake, or other changes to diet or medications. He will reduce dose 9% TWD and return 3 weeks for recheck. He will eat some leafy greens this weekend. A written dosing calendar was provided to the patient. The patient verbalized understanding and will call our office with any further questions or concerns.

## 2018-08-03 NOTE — MR AVS SNAPSHOT
[address] [facility] [address] [place] 
[phone] Patient: Teddy Samayoa MRN: [id number] BPT:2/2/4417 Visit Information Date & Time Provider Department Dept. Phone Encounter #  
 8/3/2018  9:40 AM [facility] [phone] [id number] Your Appointments 8/3/2018  9:40 AM  
[facility]AD[facility]IC with COUMYOVANA CLARK [facility] (JAZMÍN SCHEDULING) Appt Note: 1 month inr check [address] [id number]  
[address] [place] [address]  
  
    
 8/24/2018  9:40 AM  
[facility] with [facility] (JAZM[facility]N SCHEDULING) Appt Note: 3 weeks inr check [address] [place]  
[phone]  
  
    
 9/24/2018  9:40 AM  
ESTABLISHED PATIENT with Alo Alonso MD  
[facility] ([address] [facility]) Appt Note: 6 mo f/u per Dr. Grace Couch [address] [place]  
[address] [address] Upcoming Health Maintenance Date Due Hepatitis C Screening 1950 FOOT EXAM Q1 3/3/1959 MICROALBUMIN Q1 3/3/1959 EYE EXAM RETINAL OR DILATED Q1 3/3/1959 DTaP/Tdap/Td series (1 - Tdap) 3/3/1970 FOBT Q 1 YEAR AGE 50-75 3/3/1999 ZOSTER VACCINE AGE 60> 1/3/2009 GLAUCOMA SCREENING Q2Y 3/3/2014 Pneumococcal 65+ Low/Medium Risk (1 of 2 - PCV13) 3/3/2014 HEMOGLOBIN A1C Q6M 11/16/2014 MEDICARE YEARLY EXAM 3/14/2018 Influenza Age 5 to Adult 8/1/2018 LIPID PANEL Q1 9/21/2018 Allergies as of 8/3/2018  Review Complete On: 3/22/2018 By: Alo Alonso MD  
  
 Severity Noted Reaction Type Reactions Enalapril  09/03/2015    Cough Hydrochlorothiazide  07/21/2014   Side Effect Other (comments) Weakness, fatigue, very sick, extreme weight loss Current Immunizations  Never Reviewed No immunizations on file. Not reviewed this visit You Were Diagnosed With   
  
 Codes Comments Long term (current) use of anticoagulants    -  Primary ICD-10-CM: Z79.01 
ICD-9-CM: V58.61 Paroxysmal atrial fibrillation (HCC)     ICD-10-CM: I48.0 ICD-9-CM: 427.31 Vitals Smoking Status Former Smoker Preferred Pharmacy Pharmacy Name Phone [address] [facility], [address] [address] JAK Tripathi [facility] [phone] Your Updated Medication List  
  
   
This list is accurate as of 8/3/18  9:34 AM.  Always use your most recent med list.  
  
  
  
  
 atorvastatin 40 mg tablet Commonly known as:  LIPITOR Take 1 Tab by mouth nightly. carvedilol 12.5 mg tablet Commonly known as:  Chauncey Peat Take 1 Tab by mouth two (2) times daily (with meals). metFORMIN 500 mg tablet Commonly known as:  GLUCOPHAGE Take  by mouth two (2) times daily (with meals). PriLOSEC 20 mg capsule Generic drug:  omeprazole Take 20 mg by mouth every other day. warfarin 5 mg tablet Commonly known as:  COUMADIN  
TAKING 2 TABLETS (10 MG) ON THURSDAYS/SATURDAYS AND THEN TAKE 1 & 1/2 TABLET (7.5 MG) ON ALL OTHER DAYS (NEED APPOINTMENT FOR AN INR CHECK) Description Updated dosing regimen. August 2018 Details Kenji Silveirauel Tueduin Wed Thu Fri Sat  
     1  
  
  
  
   2  
  
  
  
   3  
  
7.5 mg  
See details    4  
  
7.5 mg  
  
  
  5  
  
7.5 mg  
  
   6  
  
7.5 mg  
  
   7  
  
7.5 mg  
  
   8  
  
5 mg  
  
   9  
  
7.5 mg  
  
   10  
  
7.5 mg  
  
   11  
  
7.5 mg  
  
  
  12  
  
7.5 mg  
  
   13  
  
7.5 mg  
  
   14  
  
7.5 mg  
  
   15  
  
5 mg  
  
   16  
  
7.5 mg  
  
   17  
  
7.5 mg  
  
   18  
  
  
  
  
  19  
  
  
  
   20  
  
  
  
   21  
  
  
  
   22  
  
  
  
   23  
  
  
  
 24  
  
  
  
   25  
  
  
  
  
  26  
  
  
  
   27  
  
  
  
   28  
  
  
  
   29  
  
  
  
   30  
  
  
  
   31  
  
  
  
   
 Date Details 08/03 This INR check INR: 3.5! Date of next INR:  8/17/2018 How to take your warfarin dose To take:  5 mg Take one of the 5 mg tablets. To take:  7.5 mg Take one and a half of the 5 mg tablets. We Performed the Following AMB POC PT/INR [95500 CPT(R)] AMB PT/INR EXTERNAL [[id number] CPT(R)] Comments: This order was created through the anticoagulation tracking navigator section. Introducing [facility]! Dear Silvino James: Thank you for requesting a [employer] Corn[employer] account. Our records indicate that you already have an active [employer] Corn[employer] account. You can access your account anytime at [email or web address] Did you know that you can access your hospital and ER discharge instructions at any time in [employer] Cornhole? You can also review all of your test results from your hospital stay or ER visit. Additional Information If you have questions, please visit the Frequently Asked Questions section of the [employer] Corn[employer] website at [email or web address] Remember, [employer] Cornhole is NOT to be used for urgent needs. For medical emergencies, dial 911. Now available from your iPhone and Android! Please provide this summary of care documentation to your next provider. Your primary care clinician is listed as Sylvia Christianson. If you have any questions after today's visit, please call [phone].

## 2018-08-24 ENCOUNTER — CLINICAL SUPPORT (OUTPATIENT)
Dept: CARDIOLOGY CLINIC | Age: 68
End: 2018-08-24

## 2018-08-24 DIAGNOSIS — Z79.01 LONG TERM (CURRENT) USE OF ANTICOAGULANTS: Primary | ICD-10-CM

## 2018-08-24 DIAGNOSIS — I48.0 PAROXYSMAL ATRIAL FIBRILLATION (HCC): ICD-10-CM

## 2018-08-24 LAB
INR BLD: NORMAL
INR, EXTERNAL: 1.7
PT POC: NORMAL SECONDS
VALID INTERNAL CONTROL?: YES

## 2018-08-24 NOTE — MR AVS SNAPSHOT
[address] [facility] [address] [facility] 
[phone] Patient: Yovany Hayward MRN: [id number] PNP:6/4/9308 Visit Information Date & Time Provider Department Dept. Phone Encounter #  
 8/24/2018  9:40 AM [facility] [phone] [id number] Your Appointments 8/24/2018  9:40 AM  
[facility]ADIN [facility]IC with [facility] (JAZM[facility]N SCHEDULING) Appt Note: 3 weeks inr check [address] [facility] [address]  
[address] [facility]  
  
    
 9/24/2018  9:40 AM  
ESTABLISHED PATIENT with Magy Coronel MD  
[facility] ([facility]) Appt Note: 6 mo f/u per Dr. Luis Fernando Ac [address] [place] [address]  
[address] [facility] [address]  
  
    
 9/24/2018  9:40 AM  
C[facility]MADIN CLINIC with [facility] (JAZM[facility]N SCHEDULING) Appt Note: 1 month inr check [address] [facility]  
[phone] Upcoming Health Maintenance Date Due Hepatitis C Screening 1950 FOOT EXAM Q1 3/3/1959 MICROALBUMIN Q1 3/3/1959 EYE EXAM RETINAL OR DILATED Q1 3/3/1959 DTaP/Tdap/Td series (1 - Tdap) 3/3/1970 FOBT Q 1 YEAR AGE 50-75 3/3/1999 ZOSTER VACCINE AGE 60> 1/3/2009 GLAUCOMA SCREENING Q2Y 3/3/2014 Pneumococcal 65+ Low/Medium Risk (1 of 2 - PCV13) 3/3/2014 HEMOGLOBIN A1C Q6M 11/16/2014 MEDICARE YEARLY EXAM 3/14/2018 Influenza Age 5 to Adult 8/1/2018 LIPID PANEL Q1 9/21/2018 Allergies as of 8/24/2018  Review Complete On: 3/22/2018 By: Magy Coronel MD  
  
 Severity Noted Reaction Type Reactions Enalapril  09/03/2015    Cough Hydrochlorothiazide  07/21/2014   Side Effect Other (comments) Weakness, fatigue, very sick, extreme weight loss Current Immunizations  Never Reviewed No immunizations on file. Not reviewed this visit You Were Diagnosed With   
  
 Codes Comments Long term (current) use of anticoagulants    -  Primary ICD-10-CM: Z79.01 
ICD-9-CM: V58.61 Paroxysmal atrial fibrillation (HCC)     ICD-10-CM: I48.0 ICD-9-CM: 427.31 Vitals Smoking Status Former Smoker Preferred Pharmacy Pharmacy Name Phone [address] [facility], [address] [place] [address] JAK Tripathi [facility] [phone] Your Updated Medication List  
  
   
This list is accurate as of 8/24/18  9:39 AM.  Always use your most recent med list.  
  
  
  
  
 atorvastatin 40 mg tablet Commonly known as:  LIPITOR Take 1 Tab by mouth nightly. carvedilol 12.5 mg tablet Commonly known as:  Edwin Marshal Take 1 Tab by mouth two (2) times daily (with meals). metFORMIN 500 mg tablet Commonly known as:  GLUCOPHAGE Take  by mouth two (2) times daily (with meals). PriLOSEC 20 mg capsule Generic drug:  omeprazole Take 20 mg by mouth every other day. warfarin 5 mg tablet Commonly known as:  COUMADIN  
TAKING 2 TABLETS (10 MG) ON THURSDAYS/SATURDAYS AND THEN TAKE 1 & 1/2 TABLET (7.5 MG) ON ALL OTHER DAYS (NEED APPOINTMENT FOR AN INR CHECK) Description Updated dosing regimen. August 2018 Details Sun Mon Tue Wed Thu Fri Sat  
     1  
  
  
  
   2  
  
  
  
   3  
  
  
  
   4  
  
  
  
  
  5  
  
  
  
   6  
  
  
  
   7  
  
  
  
   8  
  
  
  
   9  
  
  
  
   10  
  
  
  
   11  
  
  
  
  
  12  
  
  
  
   13  
  
  
  
   14  
  
  
  
   15  
  
  
  
   16  
  
  
  
   17  
  
  
  
   18  
  
  
  
  
  19  
  
  
  
   20  
  
  
  
   21  
  
  
  
   22  
  
  
  
   23  
  
  
  
   24  
  
10 mg See details 25  
  
7.5 mg  
  
  
  26  
  
7.5 mg  
  
   27 7.5 mg  
  
   28  
  
7.5 mg  
  
   29  
  
7.5 mg  
  
   30  
  
7.5 mg  
  
   31  
  
7.5 mg Date Details 08/24 This INR check INR: 1.7! How to take your warfarin dose To take:  7.5 mg Take one and a half of the 5 mg tablets. To take:  10 mg Take two of the 5 mg tablets. September 2018 Details Sami Prado Tue Wed Thu Fri Sat  
        1  
  
7.5 mg  
  
  
  2  
  
7.5 mg  
  
   3  
  
7.5 mg  
  
   4  
  
7.5 mg  
  
   5  
  
7.5 mg  
  
   6  
  
7.5 mg  
  
   7  
  
7.5 mg  
  
   8  
  
7.5 mg  
  
  
  9  
  
7.5 mg  
  
   10  
  
7.5 mg  
  
   11  
  
7.5 mg  
  
   12  
  
7.5 mg  
  
   13  
  
7.5 mg  
  
   14  
  
7.5 mg  
  
   15  
  
  
  
  
  16  
  
  
  
   17  
  
  
  
   18  
  
  
  
   19  
  
  
  
   20  
  
  
  
   21  
  
  
  
   22  
  
  
  
  
  23  
  
  
  
   24  
  
  
  
   25  
  
  
  
   26  
  
  
  
   27  
  
  
  
   28  
  
  
  
   29  
  
  
  
  
  30  
  
  
  
        
 Date Details No additional details Date of next INR:  9/14/2018 How to take your warfarin dose To take:  7.5 mg Take one and a half of the 5 mg tablets. We Performed the Following AMB POC PT/INR [96640 CPT(R)] AMB PT/INR EXTERNAL [[id number] CPT(R)] Comments: This order was created through the anticoagulation tracking navigator section. Introducing [facility]! Dear Luiz Lee: Thank you for requesting a Myxer account. Our records indicate that you already have an active Myxer account. You can access your account anytime at [email or web address] Did you know that you can access your hospital and ER discharge instructions at any time in Myxer? You can also review all of your test results from your hospital stay or ER visit. Additional Information If you have questions, please visit the Frequently Asked Questions section of the [employer] website at [email or web address] Remember, Educanon is NOT to be used for urgent needs. For medical emergencies, dial 911. Now available from your iPhone and Android! Please provide this summary of care documentation to your next provider. Your primary care clinician is listed as Dudley Bautista. If you have any questions after today's visit, please call [phone].

## 2018-08-24 NOTE — PROGRESS NOTES
A full discussion of the nature of anticoagulants has been carried out. A benefit risk analysis has been presented to the patient, so that they understand the justification for choosing anticoagulation at this time. The need for frequent and regular monitoring, precise dosage adjustment and compliance is stressed. Side effects of potential bleeding are discussed. The patient should avoid any OTC items containing aspirin or ibuprofen, and should avoid great swings in general diet. Avoid alcohol consumption. Call if any signs of abnormal bleeding. Next PT/INR test in September with Dr. Neymar Castellon visit. Mr. Marroquin has been compliant, dose reduced last visit due to supratherapeutic INR. The INR is now subtherapeutic. He will take 7.5mg daily, then recheck with Dr. Neymar Castellon visit. He will take 10mg this evening. The patient verbalized understanding and will call our office with any further questions or concerns.

## 2018-09-24 ENCOUNTER — CLINICAL SUPPORT (OUTPATIENT)
Dept: CARDIOLOGY CLINIC | Age: 68
End: 2018-09-24

## 2018-09-24 ENCOUNTER — OFFICE VISIT (OUTPATIENT)
Dept: CARDIOLOGY CLINIC | Age: 68
End: 2018-09-24

## 2018-09-24 VITALS
DIASTOLIC BLOOD PRESSURE: 80 MMHG | OXYGEN SATURATION: 97 % | BODY MASS INDEX: 34.22 KG/M2 | HEART RATE: 96 BPM | HEIGHT: 70 IN | WEIGHT: 239 LBS | SYSTOLIC BLOOD PRESSURE: 118 MMHG | RESPIRATION RATE: 18 BRPM

## 2018-09-24 DIAGNOSIS — I49.3 PVC (PREMATURE VENTRICULAR CONTRACTION): ICD-10-CM

## 2018-09-24 DIAGNOSIS — I25.10 CORONARY ARTERY DISEASE INVOLVING NATIVE CORONARY ARTERY OF NATIVE HEART WITHOUT ANGINA PECTORIS: ICD-10-CM

## 2018-09-24 DIAGNOSIS — I48.0 PAROXYSMAL ATRIAL FIBRILLATION (HCC): Primary | ICD-10-CM

## 2018-09-24 DIAGNOSIS — I10 ESSENTIAL HYPERTENSION: ICD-10-CM

## 2018-09-24 DIAGNOSIS — Z79.01 LONG TERM (CURRENT) USE OF ANTICOAGULANTS: Primary | ICD-10-CM

## 2018-09-24 DIAGNOSIS — I48.0 PAROXYSMAL ATRIAL FIBRILLATION (HCC): ICD-10-CM

## 2018-09-24 DIAGNOSIS — E78.2 MIXED HYPERLIPIDEMIA: ICD-10-CM

## 2018-09-24 LAB
INR BLD: NORMAL
INR, EXTERNAL: 2
PT POC: NORMAL SECONDS
VALID INTERNAL CONTROL?: YES

## 2018-09-24 NOTE — PROGRESS NOTES
HISTORY OF PRESENT ILLNESS  Rubina Vasquez is a 70 y.o. male     SUMMARY:   Problem List  Date Reviewed: 9/24/2018          Codes Class Noted    Paroxysmal atrial fibrillation (HCC) ICD-10-CM: I48.0  ICD-9-CM: 427.31  3/7/2017        Type 2 diabetes mellitus with hyperglycemia, without long-term current use of insulin (HCC) ICD-10-CM: E11.65  ICD-9-CM: 250.00, 790.29  2/23/2017        PVC (premature ventricular contraction) ICD-10-CM: I49.3  ICD-9-CM: 427.69  9/4/2014        HTN (hypertension) ICD-10-CM: I10  ICD-9-CM: 401.9  7/21/2014        Hyperlipidemia ICD-10-CM: E78.5  ICD-9-CM: 272.4  7/21/2014        CAD (coronary artery disease) ICD-10-CM: I25.10  ICD-9-CM: 414.00  7/21/2014    Overview Addendum 9/4/2014 11:55 AM by Kinsey Wilson MD     12/12 abnormal stress test [place], then bms to ramus, mod distal disease, normal lvef  60-70% distal pda and lad. Current Outpatient Prescriptions on File Prior to Visit   Medication Sig    warfarin (COUMADIN) 5 mg tablet TAKING 2 TABLETS (10 MG) ON THURSDAYS/SATURDAYS AND THEN TAKE 1 & 1/2 TABLET (7.5 MG) ON ALL OTHER DAYS (NEED APPOINTMENT FOR AN INR CHECK) (Patient taking differently: Taking 1 1/2 tablets (7.5 mg) daily except on Thursdays, take 2 tablets (10 mg))    omeprazole (PRILOSEC) 20 mg capsule Take 20 mg by mouth every other day.  atorvastatin (LIPITOR) 40 mg tablet Take 1 Tab by mouth nightly.  carvedilol (COREG) 12.5 mg tablet Take 1 Tab by mouth two (2) times daily (with meals).  metFORMIN (GLUCOPHAGE) 500 mg tablet Take  by mouth two (2) times daily (with meals). No current facility-administered medications on file prior to visit. CARDIOLOGY STUDIES TO DATE:  12/12 exercise cardiolyte apical ischemia, lvef 61%  12/12 normal echo      Chief Complaint   Patient presents with    Irregular Heart Beat     HPI :  Mr. Marroquin is doing fine.   He is walking some, though not as much as he used to, but when he goes to [facility] to his condo, he walks every day without any worrisome symptoms. Dr. Elsa Umaña is following his lipids and his diabetes. Blood pressure is under good control. He is having no problems with his medications. CARDIAC ROS:   negative for chest pain, dyspnea, palpitations, syncope, orthopnea, paroxysmal nocturnal dyspnea, exertional chest pressure/discomfort, claudication, lower extremity edema    Family History   Problem Relation Age of Onset    Heart Disease Father        Past Medical History:   Diagnosis Date    Diverticulosis     Duodenal ulcer        GENERAL ROS:  A comprehensive review of systems was negative except for that written in the HPI.     Visit Vitals    /80 (BP 1 Location: Left arm, BP Patient Position: Sitting)    Pulse 96    Resp 18    Ht 5' 10\" (1.778 m)    Wt 239 lb (108.4 kg)    SpO2 97%    BMI 34.29 kg/m2       Wt Readings from Last 3 Encounters:   09/24/18 239 lb (108.4 kg)   03/22/18 242 lb (109.8 kg)   09/21/17 241 lb 6.4 oz (109.5 kg)            BP Readings from Last 3 Encounters:   09/24/18 118/80   03/22/18 122/78   09/21/17 130/82       PHYSICAL EXAM  General appearance: alert, cooperative, no distress, appears stated age  Neurologic: Alert and oriented X 3  Neck: supple, symmetrical, trachea midline, no adenopathy, no carotid bruit and no JVD  Lungs: clear to auscultation bilaterally  Heart: irregularly irregular rhythm, S1, S2 normal, no S3 or S4  Extremities: extremities normal, atraumatic, no cyanosis or edema    Lab Results   Component Value Date/Time    Cholesterol, total 161 09/21/2017 10:14 AM    Cholesterol, total 148 02/23/2017 11:14 AM    Cholesterol, total 151 08/25/2016 10:37 AM    Cholesterol, total 141 03/03/2016 11:42 AM    Cholesterol, total 164 09/03/2015 11:46 AM    HDL Cholesterol 34 (L) 09/21/2017 10:14 AM    HDL Cholesterol 31 (L) 02/23/2017 11:14 AM    HDL Cholesterol 29 (L) 08/25/2016 10:37 AM    HDL Cholesterol 28 (L) 03/03/2016 11:42 AM    HDL Cholesterol 32 (L) 09/03/2015 11:46 AM    LDL, calculated 93 09/21/2017 10:14 AM    LDL, calculated 84 02/23/2017 11:14 AM    LDL, calculated 93 08/25/2016 10:37 AM    LDL, calculated 91 03/03/2016 11:42 AM    LDL, calculated 102 (H) 09/03/2015 11:46 AM    Triglyceride 171 (H) 09/21/2017 10:14 AM    Triglyceride 166 (H) 02/23/2017 11:14 AM    Triglyceride 145 08/25/2016 10:37 AM    Triglyceride 111 03/03/2016 11:42 AM    Triglyceride 152 (H) 09/03/2015 11:46 AM     ASSESSMENT  Mr. Marroquin is stable, asymptomatic and well compensated on a good medical regimen and needs no cardiac testing at this time. We will get a protime. current treatment plan is effective, no change in therapy  lab results and schedule of future lab studies reviewed with patient  reviewed diet, exercise and weight control    Encounter Diagnoses   Name Primary?  Paroxysmal atrial fibrillation (HCC) Yes    PVC (premature ventricular contraction)     Essential hypertension     Mixed hyperlipidemia     Coronary artery disease involving native coronary artery of native heart without angina pectoris      No orders of the defined types were placed in this encounter. Follow-up Disposition:  Return in about 6 months (around 3/24/2019).     Eduin Hammnods MD  9/24/2018

## 2018-09-24 NOTE — PROGRESS NOTES
A full discussion of the nature of anticoagulants has been carried out. A benefit risk analysis has been presented to the patient, so that they understand the justification for choosing anticoagulation at this time. The need for frequent and regular monitoring, precise dosage adjustment and compliance is stressed. Side effects of potential bleeding are discussed. The patient should avoid any OTC items containing aspirin or ibuprofen, and should avoid great swings in general diet. Avoid alcohol consumption. Call if any signs of abnormal bleeding. Next PT/INR test in 3 weeks. The INR is stable and therapeutic. Continue same dose of coumadin and recheck in 1 month.

## 2018-10-15 ENCOUNTER — CLINICAL SUPPORT (OUTPATIENT)
Dept: CARDIOLOGY CLINIC | Age: 68
End: 2018-10-15

## 2018-10-15 DIAGNOSIS — I25.10 CORONARY ARTERY DISEASE INVOLVING NATIVE CORONARY ARTERY OF NATIVE HEART WITHOUT ANGINA PECTORIS: ICD-10-CM

## 2018-10-15 DIAGNOSIS — I48.0 PAROXYSMAL ATRIAL FIBRILLATION (HCC): ICD-10-CM

## 2018-10-15 DIAGNOSIS — Z79.01 LONG TERM (CURRENT) USE OF ANTICOAGULANTS: Primary | ICD-10-CM

## 2018-10-15 LAB
INR BLD: 2.5
PT POC: NORMAL SECONDS
VALID INTERNAL CONTROL?: YES

## 2018-10-15 RX ORDER — WARFARIN 7.5 MG/1
7.5 TABLET ORAL DAILY
Qty: 30 TAB | Refills: 1
Start: 2018-10-15 | End: 2018-12-17 | Stop reason: SDUPTHER

## 2018-10-15 NOTE — PROGRESS NOTES
A full discussion of the nature of anticoagulants has been carried out. A benefit risk analysis has been presented to the patient, so that they understand the justification for choosing anticoagulation at this time. The need for frequent and regular monitoring, precise dosage adjustment and compliance is stressed. Side effects of potential bleeding are discussed. The patient should avoid any OTC items containing aspirin or ibuprofen, and should avoid great swings in general diet. Avoid alcohol consumption. Call if any signs of abnormal bleeding. Next PT/INR test in 1 month Pt INR is stable and therapeutic no need for any changes at this time.

## 2018-11-12 ENCOUNTER — CLINICAL SUPPORT (OUTPATIENT)
Dept: CARDIOLOGY CLINIC | Age: 68
End: 2018-11-12

## 2018-11-12 DIAGNOSIS — I48.0 PAROXYSMAL ATRIAL FIBRILLATION (HCC): ICD-10-CM

## 2018-11-12 DIAGNOSIS — Z79.01 LONG TERM (CURRENT) USE OF ANTICOAGULANTS: Primary | ICD-10-CM

## 2018-11-12 LAB
INR BLD: 2
PT POC: NORMAL SECONDS
VALID INTERNAL CONTROL?: YES

## 2018-11-12 NOTE — PROGRESS NOTES
A full discussion of the nature of anticoagulants has been carried out. A benefit risk analysis has been presented to the patient, so that they understand the justification for choosing anticoagulation at this time. The need for frequent and regular monitoring, precise dosage adjustment and compliance is stressed. Side effects of potential bleeding are discussed. The patient should avoid any OTC items containing aspirin or ibuprofen, and should avoid great swings in general diet. Avoid alcohol consumption. Call if any signs of abnormal bleeding. Next PT/INR test in 5 weeks due to patient going out of [facility] ([place] [address], [place]) Reports no changes in diet or medications. No changes in dosing regimen.

## 2018-12-17 ENCOUNTER — ANTI-COAG VISIT (OUTPATIENT)
Dept: CARDIOLOGY CLINIC | Age: 68
End: 2018-12-17

## 2018-12-17 DIAGNOSIS — Z79.01 LONG TERM (CURRENT) USE OF ANTICOAGULANTS: ICD-10-CM

## 2018-12-17 DIAGNOSIS — I25.10 CORONARY ARTERY DISEASE INVOLVING NATIVE CORONARY ARTERY OF NATIVE HEART WITHOUT ANGINA PECTORIS: ICD-10-CM

## 2018-12-17 DIAGNOSIS — I48.0 PAROXYSMAL ATRIAL FIBRILLATION (HCC): ICD-10-CM

## 2018-12-17 LAB
INR BLD: 2.7
PT POC: NORMAL SECONDS
VALID INTERNAL CONTROL?: YES

## 2018-12-17 RX ORDER — WARFARIN 7.5 MG/1
7.5 TABLET ORAL DAILY
Qty: 90 TAB | Refills: 0 | Status: SHIPPED | OUTPATIENT
Start: 2018-12-17 | End: 2019-02-15 | Stop reason: SDUPTHER

## 2018-12-17 NOTE — PROGRESS NOTES
A full discussion of the nature of anticoagulants has been carried out. A benefit risk analysis has been presented to the patient, so that they understand the justification for choosing anticoagulation at this time. The need for frequent and regular monitoring, precise dosage adjustment and compliance is stressed. Side effects of potential bleeding are discussed. The patient should avoid any OTC items containing aspirin or ibuprofen, and should avoid great swings in general diet. Avoid alcohol consumption. Call if any signs of abnormal bleeding. Next PT/INR test in 1 month. Patient [place]. He reports he will get his INR checked at urgent care. Instructed patient to call office with INR result so we can monitor while he is out of town. Patient verbalized understanding. Anticoagulation Summary  As of 2018    INR goal:   2.0-3.0   TTR:   81.6 % (1.2 y)   INR used for dosin.7 (2018)   Warfarin maintenance plan:   7.5 mg (5 mg x 1.5) every day   Weekly warfarin total:   52.5 mg   Plan last modified:   Manju Lutz RN (2018)   Next INR check:   2019   Target end date:    Indefinite    Indications    Paroxysmal atrial fibrillation ([address] [I48.0]  Long term (current) use of anticoagulants [Z79.01]             Anticoagulation Episode Summary     INR check location:       Preferred lab:       Send INR reminders to:       Comments:         Anticoagulation Care Providers     Provider Role Specialty Phone number    Loni Jamison MD Responsible Cardiology [phone]          Future Appointments   Date Time Provider Micheal Coco   2018 10:40 AM COUMADIN,  [address]   3/25/2019  9:40 AM Loni Jamison  [address]

## 2019-02-15 ENCOUNTER — TELEPHONE (OUTPATIENT)
Dept: CARDIOLOGY CLINIC | Age: 69
End: 2019-02-15

## 2019-02-15 ENCOUNTER — TELEPHONE ANTICOAG (OUTPATIENT)
Dept: CARDIOLOGY CLINIC | Age: 69
End: 2019-02-15

## 2019-02-15 DIAGNOSIS — I48.0 PAROXYSMAL ATRIAL FIBRILLATION (HCC): ICD-10-CM

## 2019-02-15 DIAGNOSIS — I48.0 PAROXYSMAL ATRIAL FIBRILLATION (HCC): Primary | ICD-10-CM

## 2019-02-15 DIAGNOSIS — I25.10 CORONARY ARTERY DISEASE INVOLVING NATIVE CORONARY ARTERY OF NATIVE HEART WITHOUT ANGINA PECTORIS: ICD-10-CM

## 2019-02-15 DIAGNOSIS — Z79.01 LONG TERM (CURRENT) USE OF ANTICOAGULANTS: ICD-10-CM

## 2019-02-15 LAB — INR, EXTERNAL: 5.9

## 2019-02-15 RX ORDER — WARFARIN 7.5 MG/1
7.5 TABLET ORAL DAILY
Qty: 90 TAB | Refills: 0 | Status: SHIPPED | OUTPATIENT
Start: 2019-02-15 | End: 2021-01-04

## 2019-02-15 NOTE — TELEPHONE ENCOUNTER
Called patient and informed him lab order faxed to [address] [place] [address]. When trying to confirm patients current dose he informed me is taking 1.5 tabs daily. Last refill prior to today was for 5 mg tabs and he was to take 1.5 tabs daily. Patient is reporting his current Rx bottle states it is 7.5 mg tabs and he is taking 1.5 of those. Patients last INR check was 12/2018. Instructed patient to get INR check ASAP so we can see where his levels are to discuss his dosing.   Patient verbalized understanding

## 2019-02-15 NOTE — TELEPHONE ENCOUNTER
Pt called following up on earlier call he states regarding getting an coumadin order faxed to [address]. Pt states office doesn't have it.   Fax# [phone]  Phone # [phone]  Thanks

## 2019-02-15 NOTE — TELEPHONE ENCOUNTER
Last office note faxed to [id number] [facility] at [phone] with confirmation

## 2019-02-15 NOTE — TELEPHONE ENCOUNTER
Patient states he is in [place] and wants to get a coumadin check. He says he found a lab that will do it with an order from his doctor's office.    [facility]   Phone: [phone] [facility]:[phone]    Phone: [phone]

## 2019-02-15 NOTE — PROGRESS NOTES
Patient in [facility] till March. Had INR drawn at [facility]. Faxed report received from them indicating patients INR 5.9. Patient was taking 1.5 tabs of a 7.5 mg pill size. Chart indicates he should have been taking 7.5 mg total (1.5 tabs of a 5 mg pill size).   Will reach out to [facility] to discuss

## 2019-02-15 NOTE — TELEPHONE ENCOUNTER
Called [facility] in [place]. Reported ok to send [facility] order via fax in order for them to draw patients PT/INR [place] of [place]. Instructions on order include fax number to clinic for them to fax results once available.

## 2019-02-15 NOTE — TELEPHONE ENCOUNTER
Received a call from patient asking for a lab order to be sent to a medical office in [place] who agreed to test his INR. Patient last checked in office 12/2018. Patient has since been in [place] and is scheduled to be there till March. When confirming patients coumadin dose, he reports taking 1.5 tabs. Original script and orders were for 1.5 tabs of a 5 mg pill size for total of 7.5 mg daily. Patient reported his current pill bottle stated it was a 7.5 mg pill size and he has been taking 1.5 tabs of that. INR at [facility] today was 5.9. When calling the office to follow up they informed me they have sent the patient to the ED. They report on his EKG he was in afib with multiple PVC's and a heart rate in the 140's. They also report patient was slurring his speech which they are unsure if normal for him since they have never seen him prior. Also stated patient has reported to them multiple falls recently.

## 2019-02-15 NOTE — TELEPHONE ENCOUNTER
Requested Prescriptions     Signed Prescriptions Disp Refills    warfarin (COUMADIN) 7.5 mg tablet 90 Tab 0     Sig: Take 1 Tab by mouth daily.      Authorizing Provider: Isis Troy     Ordering User: Tevin Savage       Per Dr. Ndiaye [facility]   Date Time Provider [facility]   3/25/2019  9:40 AM Dae Uriarte  [address]

## 2019-02-15 NOTE — TELEPHONE ENCOUNTER
There is probably something going on, like infection given inr abnormalities and other symptoms. Forward recent office note to them if you can, it may help.

## 2019-02-16 LAB — INR, EXTERNAL: 3.91

## 2019-02-18 ENCOUNTER — TELEPHONE ANTICOAG (OUTPATIENT)
Dept: CARDIOLOGY CLINIC | Age: 69
End: 2019-02-18

## 2019-02-18 DIAGNOSIS — I48.0 PAROXYSMAL ATRIAL FIBRILLATION (HCC): ICD-10-CM

## 2019-02-18 DIAGNOSIS — Z79.01 LONG TERM (CURRENT) USE OF ANTICOAGULANTS: ICD-10-CM

## 2019-03-25 ENCOUNTER — ANTI-COAG VISIT (OUTPATIENT)
Dept: CARDIOLOGY CLINIC | Age: 69
End: 2019-03-25

## 2019-03-25 ENCOUNTER — TELEPHONE (OUTPATIENT)
Dept: CARDIOLOGY CLINIC | Age: 69
End: 2019-03-25

## 2019-03-25 ENCOUNTER — OFFICE VISIT (OUTPATIENT)
Dept: CARDIOLOGY CLINIC | Age: 69
End: 2019-03-25

## 2019-03-25 VITALS
WEIGHT: 245.6 LBS | HEIGHT: 70 IN | RESPIRATION RATE: 18 BRPM | HEART RATE: 91 BPM | OXYGEN SATURATION: 98 % | SYSTOLIC BLOOD PRESSURE: 122 MMHG | BODY MASS INDEX: 35.16 KG/M2 | DIASTOLIC BLOOD PRESSURE: 86 MMHG

## 2019-03-25 DIAGNOSIS — E11.65 TYPE 2 DIABETES MELLITUS WITH HYPERGLYCEMIA, WITHOUT LONG-TERM CURRENT USE OF INSULIN (HCC): ICD-10-CM

## 2019-03-25 DIAGNOSIS — I48.0 PAROXYSMAL ATRIAL FIBRILLATION (HCC): ICD-10-CM

## 2019-03-25 DIAGNOSIS — I25.10 CORONARY ARTERY DISEASE INVOLVING NATIVE CORONARY ARTERY OF NATIVE HEART WITHOUT ANGINA PECTORIS: ICD-10-CM

## 2019-03-25 DIAGNOSIS — I48.0 PAROXYSMAL ATRIAL FIBRILLATION (HCC): Primary | ICD-10-CM

## 2019-03-25 DIAGNOSIS — I10 ESSENTIAL HYPERTENSION: ICD-10-CM

## 2019-03-25 DIAGNOSIS — E78.2 MIXED HYPERLIPIDEMIA: ICD-10-CM

## 2019-03-25 DIAGNOSIS — Z79.01 LONG TERM (CURRENT) USE OF ANTICOAGULANTS: ICD-10-CM

## 2019-03-25 PROBLEM — E66.01 SEVERE OBESITY (HCC): Status: ACTIVE | Noted: 2019-03-25

## 2019-03-25 LAB
INR BLD: 1.8
PT POC: NORMAL SECONDS
VALID INTERNAL CONTROL?: YES

## 2019-03-25 RX ORDER — CARVEDILOL 12.5 MG/1
12.5 TABLET ORAL 2 TIMES DAILY WITH MEALS
Qty: 60 TAB | Refills: 5 | Status: CANCELLED | OUTPATIENT
Start: 2019-03-25

## 2019-03-25 RX ORDER — WARFARIN SODIUM 5 MG/1
TABLET ORAL
Qty: 90 TAB | Refills: 0 | Status: SHIPPED | OUTPATIENT
Start: 2019-03-25 | End: 2019-05-20 | Stop reason: SDUPTHER

## 2019-03-25 RX ORDER — WARFARIN SODIUM 5 MG/1
5 TABLET ORAL DAILY
COMMUNITY
End: 2019-03-25 | Stop reason: SDUPTHER

## 2019-03-25 RX ORDER — CARVEDILOL 25 MG/1
25 TABLET ORAL 2 TIMES DAILY WITH MEALS
Qty: 60 TAB | Refills: 5 | Status: SHIPPED | OUTPATIENT
Start: 2019-03-25 | End: 2019-09-24 | Stop reason: SDUPTHER

## 2019-03-25 NOTE — TELEPHONE ENCOUNTER
Patient in for coumadin appt. Requesting refill on Carvedilol. Med list indicates 12.5 mg tablet. His bottle from out of [place] lists 25 mg twice a day. Please send refill to [facility] in [place].

## 2019-03-25 NOTE — PROGRESS NOTES
1. Have you been to the ER, urgent care clinic since your last visit? Hospitalized since your last visit? 3/4/19 Elevated INR 2. Have you seen or consulted any other health care providers outside of the [address] [place] [address] since your last visit? Include any pap smears or colon screening. No 
 
3) Do you have an Advance Directive on file? no 
 
Patient is accompanied by self I have received verbal consent from [address] to discuss any/all medical information while they are present in the room.

## 2019-03-25 NOTE — PROGRESS NOTES
HISTORY OF PRESENT ILLNESS Rene Miranda is a 78 y.o. male SUMMARY:  
Problem List  Date Reviewed: 3/25/2019 Codes Class Noted Severe obesity ([address] ICD-10-CM: E66.01 
ICD-9-CM: 278.01  3/25/2019 Long term (current) use of anticoagulants ICD-10-CM: Z79.01 
ICD-9-CM: V58.61  11/12/2018 Paroxysmal atrial fibrillation (HCC) ICD-10-CM: I48.0 ICD-9-CM: 427.31  3/7/2017 Type 2 diabetes mellitus with hyperglycemia, without long-term current use of insulin (HCC) ICD-10-CM: E11.65 ICD-9-CM: 250.00, 790.29  2/23/2017 PVC (premature ventricular contraction) ICD-10-CM: I49.3 ICD-9-CM: 427.69  9/4/2014 HTN (hypertension) ICD-10-CM: I10 
ICD-9-CM: 401.9  7/21/2014 Hyperlipidemia ICD-10-CM: E78.5 ICD-9-CM: 272.4  7/21/2014 CAD (coronary artery disease) ICD-10-CM: I25.10 ICD-9-CM: 414.00  7/21/2014 Overview Addendum 9/4/2014 11:55 AM by Taina Martinez MD  
  12/12 abnormal stress test [place], then bms to ramus, mod distal disease, normal lvef 60-70% distal pda and lad. Current Outpatient Medications on File Prior to Visit Medication Sig  
 magnesium 250 mg tab Take  by mouth.  warfarin (COUMADIN) 7.5 mg tablet Take 1 Tab by mouth daily.  omeprazole (PRILOSEC) 20 mg capsule Take 20 mg by mouth every other day.  atorvastatin (LIPITOR) 40 mg tablet Take 1 Tab by mouth nightly.  carvedilol (COREG) 12.5 mg tablet Take 1 Tab by mouth two (2) times daily (with meals).  metFORMIN (GLUCOPHAGE) 500 mg tablet Take 1,000 mg by mouth two (2) times daily (with meals). No current facility-administered medications on file prior to visit. CARDIOLOGY STUDIES TO DATE: 
12/12 exercise cardiolyte apical ischemia, lvef 61% 12/12 normal echo Chief Complaint Patient presents with  Irregular Heart Beat HPI : 
Mr. Marroquin was hospitalized for a couple of days in [facility] in February with his INR prolonged without any obvious bleeding and with some transient rapid atrial fibrillation. I received those records and reviewed them and summarized them in the appropriate places in his chart. He was totally unaware of the rapid atrial fibrillation. He is walking some though not as much as he should and he has not lost any weight. Dr. Alyce Hightower is following his lipids and his diabetes. CARDIAC ROS:  
negative for chest pain, dyspnea, palpitations, syncope, orthopnea, paroxysmal nocturnal dyspnea, exertional chest pressure/discomfort, claudication, lower extremity edema Family History Problem Relation Age of Onset  Heart Disease Father Past Medical History:  
Diagnosis Date  Diverticulosis  Duodenal ulcer GENERAL ROS: 
A comprehensive review of systems was negative except for that written in the HPI. Visit Vitals /86 (BP 1 Location: Left arm, BP Patient Position: Sitting) Pulse 91 Resp 18 Ht 5' 10\" (1.778 m) Wt 245 lb 9.6 oz (111.4 kg) SpO2 98% BMI 35.24 kg/m² Wt Readings from Last 3 Encounters:  
03/25/19 245 lb 9.6 oz (111.4 kg) 09/24/18 239 lb (108.4 kg) 03/22/18 242 lb (109.8 kg) BP Readings from Last 3 Encounters:  
03/25/19 122/86  
09/24/18 118/80  
03/22/18 122/78 PHYSICAL EXAM 
General appearance: alert, cooperative, no distress, appears stated age Neurologic: Alert and oriented X 3 Neck: supple, symmetrical, trachea midline, no adenopathy, no carotid bruit and no JVD Lungs: clear to auscultation bilaterally Heart: irregularly irregular rhythm, S1, S2 normal, no S3 or S4 Extremities: extremities normal, atraumatic, no cyanosis or edema Lab Results Component Value Date/Time  Cholesterol, total 161 09/21/2017 10:14 AM  
 Cholesterol, total 148 02/23/2017 11:14 AM  
 Cholesterol, total 151 08/25/2016 10:37 AM  
 Cholesterol, total 141 03/03/2016 11:42 AM  
 Cholesterol, total 164 09/03/2015 11:46 AM  
 HDL Cholesterol 34 (L) 09/21/2017 10:14 AM  
 HDL Cholesterol 31 (L) 02/23/2017 11:14 AM  
 HDL Cholesterol 29 (L) 08/25/2016 10:37 AM  
 HDL Cholesterol 28 (L) 03/03/2016 11:42 AM  
 HDL Cholesterol 32 (L) 09/03/2015 11:46 AM  
 LDL, calculated 93 09/21/2017 10:14 AM  
 LDL, calculated 84 02/23/2017 11:14 AM  
 LDL, calculated 93 08/25/2016 10:37 AM  
 LDL, calculated 91 03/03/2016 11:42 AM  
 LDL, calculated 102 (H) 09/03/2015 11:46 AM  
 Triglyceride 171 (H) 09/21/2017 10:14 AM  
 Triglyceride 166 (H) 02/23/2017 11:14 AM  
 Triglyceride 145 08/25/2016 10:37 AM  
 Triglyceride 111 03/03/2016 11:42 AM  
 Triglyceride 152 (H) 09/03/2015 11:46 AM  
 
ASSESSMENT Mr. Marroquin is stable, asymptomatic and well compensated at this point on a good medical regimen and needs no cardiac testing at this time. We will track down his most recent lipid profile. current treatment plan is effective, no change in therapy 
lab results and schedule of future lab studies reviewed with patient 
reviewed diet, exercise and weight control Encounter Diagnoses Name Primary?  Paroxysmal atrial fibrillation (HCC) Yes  Type 2 diabetes mellitus with hyperglycemia, without long-term current use of insulin ([facility]  Essential hypertension  Coronary artery disease involving native coronary artery of native heart without angina pectoris  Mixed hyperlipidemia Orders Placed This Encounter  magnesium 250 mg tab Follow-up and Dispositions · Return in about 6 months (around 9/25/2019). Anisha Braxton MD3/25/2019

## 2019-03-25 NOTE — TELEPHONE ENCOUNTER
Called patient's pharmacy. They have patient's carvedilol as 12.5 mg bid in their system, but stated he last picked it up at a different pharmacy for 25 mg tabs bid on February 18th. It was prescribed by Dr. LOO [facility]. Called patient. He said the carvedilol was increased to 25 mg bid when he was at the hospital in [facility] for 3 days with a-fib. He has been taking 25 mg bid for about 3 weeks. I told him I will just confirm with Dr. Janae Gracia that it is okay to change dose in his chart and will send in refill for that. He will make sure he does not take 12.5 in addition to 25 mg tabs. Patient verbalized understanding and denied further questions or concerns. Dr. Janae Gracia advised continue 25 mg bid. Requested Prescriptions     Signed Prescriptions Disp Refills    carvedilol (COREG) 25 mg tablet 60 Tab 5     Sig: Take 1 Tab by mouth two (2) times daily (with meals).      Authorizing Provider: Spencer Floyd     Ordering User: Nell Dennison    Per Dr. Kitty Rogers verbal orders

## 2019-03-25 NOTE — PROGRESS NOTES
A full discussion of the nature of anticoagulants has been carried out. A benefit risk analysis has been presented to the patient, so that they understand the justification for choosing anticoagulation at this time. The need for frequent and regular monitoring, precise dosage adjustment and compliance is stressed. Side effects of potential bleeding are discussed. The patient should avoid any OTC items containing aspirin or ibuprofen, and should avoid great swings in general diet. Avoid alcohol consumption. Call if any signs of abnormal bleeding. Next PT/INR test in 2 weeks. Continue current dosing regimen. Patient verbalized understanding Requested Prescriptions Signed Prescriptions Disp Refills  warfarin (COUMADIN) 5 mg tablet 90 Tab 0 Sig: Take 1.5 tabs daily or as directed by coumadin clinic Per Dr. Melita Schwab Future Appointments Date Time Provider Micheal Sepulveda 3/25/2019 11:00 AM COUMADINYOVANA SCHED  
2019  9:00 AM COUMADIN, YOVANA MALLORYENA SCHED  
2019  9:40 AM Herrera Aguilar  [address] Anticoagulation Summary  As of 3/25/2019 INR goal:   2.0-3.0  
TTR:   71.1 % (1.5 y) INR used for dosin.8! (3/25/2019) Warfarin maintenance plan:   7.5 mg (5 mg x 1.5) every day Weekly warfarin total:   52.5 mg  
Plan last modified:   Nicanor Romero RN (2018) Next INR check:   2019 Target end date: Indefinite Indications Paroxysmal atrial fibrillation (HCC) [I48.0] Long term (current) use of anticoagulants [Z79.01] Anticoagulation Episode Summary INR check location:     
 Preferred lab:     
 Send INR reminders to:     
 Comments: Anticoagulation Care Providers Provider Role Specialty Phone number Herrera Aguilar MD Responsible Cardiology [phone] Future Appointments Date Time Provider Micheal Sepulveda 3/25/2019 11:00 AM YOVANA MCGOVERN SCHED  
4/8/2019  9:00 AM YOVANA MCGOVERN SCHED  
9/26/2019  9:40 AM Radha Laura  [address]

## 2019-04-08 ENCOUNTER — ANTI-COAG VISIT (OUTPATIENT)
Dept: CARDIOLOGY CLINIC | Age: 69
End: 2019-04-08

## 2019-04-08 DIAGNOSIS — Z79.01 LONG TERM (CURRENT) USE OF ANTICOAGULANTS: ICD-10-CM

## 2019-04-08 DIAGNOSIS — I48.0 PAROXYSMAL ATRIAL FIBRILLATION (HCC): ICD-10-CM

## 2019-04-08 LAB
INR BLD: 2.7
PT POC: NORMAL SECONDS
VALID INTERNAL CONTROL?: YES

## 2019-04-08 NOTE — PROGRESS NOTES
A full discussion of the nature of anticoagulants has been carried out. A benefit risk analysis has been presented to the patient, so that they understand the justification for choosing anticoagulation at this time. The need for frequent and regular monitoring, precise dosage adjustment and compliance is stressed. Side effects of potential bleeding are discussed. The patient should avoid any OTC items containing aspirin or ibuprofen, and should avoid great swings in general diet. Avoid alcohol consumption. Call if any signs of abnormal bleeding. Next PT/INR test in 2 weeks. Continue current dosing regimen. Patient verbalized understanding Anticoagulation Summary  As of 2019 INR goal:   2.0-3.0  
TTR:   71.3 % (1.5 y) INR used for dosin.7 (2019) Warfarin maintenance plan:   7.5 mg (5 mg x 1.5) every day Weekly warfarin total:   52.5 mg  
Plan last modified:   Parth Grigsby RN (2018) Next INR check:     
Target end date: Indefinite Indications Paroxysmal atrial fibrillation (HCC) [I48.0] Long term (current) use of anticoagulants [Z79.01] Anticoagulation Episode Summary INR check location:     
 Preferred lab:     
 Send INR reminders to:     
 Comments: Anticoagulation Care Providers Provider Role Specialty Phone number Gaby Ferro MD Responsible Cardiology [phone] Future Appointments Date Time Provider Micheal Sepulveda 2019  9:40 AM Gaby Ferro  [address]

## 2019-04-29 ENCOUNTER — ANTI-COAG VISIT (OUTPATIENT)
Dept: CARDIOLOGY CLINIC | Age: 69
End: 2019-04-29

## 2019-04-29 DIAGNOSIS — I48.0 PAROXYSMAL ATRIAL FIBRILLATION (HCC): ICD-10-CM

## 2019-04-29 DIAGNOSIS — Z79.01 LONG TERM (CURRENT) USE OF ANTICOAGULANTS: ICD-10-CM

## 2019-04-29 LAB
INR BLD: 2.5
PT POC: NORMAL SECONDS
VALID INTERNAL CONTROL?: YES

## 2019-04-29 NOTE — PROGRESS NOTES
A full discussion of the nature of anticoagulants has been carried out. A benefit risk analysis has been presented to the patient, so that they understand the justification for choosing anticoagulation at this time. The need for frequent and regular monitoring, precise dosage adjustment and compliance is stressed. Side effects of potential bleeding are discussed. The patient should avoid any OTC items containing aspirin or ibuprofen, and should avoid great swings in general diet. Avoid alcohol consumption. Call if any signs of abnormal bleeding. Next PT/INR test in 1 month. Continue current dosing regimen. Patient verbalized understanding Anticoagulation Summary  As of 2019 INR goal:   2.0-3.0  
TTR:   72.3 % (1.6 y) INR used for dosin.5 (2019) Warfarin maintenance plan:   7.5 mg (5 mg x 1.5) every day Weekly warfarin total:   52.5 mg  
Plan last modified:   Capri Anguiano RN (2018) Next INR check:   2019 Target end date: Indefinite Indications Paroxysmal atrial fibrillation (HCC) [I48.0] Long term (current) use of anticoagulants [Z79.01] Anticoagulation Episode Summary INR check location:     
 Preferred lab:     
 Send INR reminders to:     
 Comments: Anticoagulation Care Providers Provider Role Specialty Phone number Damari Kate MD Responsible Cardiology [phone] Future Appointments Date Time Provider Micheal Sepulveda 2019  9:00 AM COUMADINYOVANA SCHED  
2019  9:00 AM COUMADIN, YOVANA NOYOLA SCHED  
2019  9:40 AM Damari Kate  [address]

## 2019-05-20 DIAGNOSIS — Z79.01 LONG TERM (CURRENT) USE OF ANTICOAGULANTS: ICD-10-CM

## 2019-05-20 DIAGNOSIS — I48.0 PAROXYSMAL ATRIAL FIBRILLATION (HCC): ICD-10-CM

## 2019-05-20 RX ORDER — WARFARIN SODIUM 5 MG/1
TABLET ORAL
Qty: 90 TAB | Refills: 0 | Status: SHIPPED | OUTPATIENT
Start: 2019-05-20 | End: 2019-07-22 | Stop reason: SDUPTHER

## 2019-05-20 NOTE — TELEPHONE ENCOUNTER
Requested Prescriptions     Signed Prescriptions Disp Refills    warfarin (COUMADIN) 5 mg tablet 90 Tab 0     Sig: TAKE 1 & 1/2 (ONE & ONE-HALF) TABLETS BY MOUTH ONCE DAILY OR AS DIRECTED BY [facility]IN [facility]     Authorizing Provider: Caio Bello     Ordering User: Shalom Zheng    Per Dr. Crocker Phi verbal orders

## 2019-05-29 ENCOUNTER — ANTI-COAG VISIT (OUTPATIENT)
Dept: CARDIOLOGY CLINIC | Age: 69
End: 2019-05-29

## 2019-05-29 DIAGNOSIS — I48.0 PAROXYSMAL ATRIAL FIBRILLATION (HCC): ICD-10-CM

## 2019-05-29 DIAGNOSIS — Z79.01 LONG TERM (CURRENT) USE OF ANTICOAGULANTS: ICD-10-CM

## 2019-05-29 LAB
INR BLD: 2.5
PT POC: NORMAL SECONDS
VALID INTERNAL CONTROL?: YES

## 2019-05-29 NOTE — PROGRESS NOTES
A full discussion of the nature of anticoagulants has been carried out. A benefit risk analysis has been presented to the patient, so that they understand the justification for choosing anticoagulation at this time. The need for frequent and regular monitoring, precise dosage adjustment and compliance is stressed. Side effects of potential bleeding are discussed. The patient should avoid any OTC items containing aspirin or ibuprofen, and should avoid great swings in general diet. Avoid alcohol consumption. Call if any signs of abnormal bleeding. Next PT/INR test in 1 month. Continue current dosing regimen. Patient verbalized understanding      Anticoagulation Summary  As of 2019    INR goal:   2.0-3.0   TTR:   73.7 % (1.7 y)   INR used for dosin.5 (2019)   Warfarin maintenance plan:   7.5 mg (5 mg x 1.5) every day   Weekly warfarin total:   52.5 mg   Plan last modified:   Shivani Thakur RN (2018)   Next INR check:      Target end date:    Indefinite    Indications    Paroxysmal atrial fibrillation ([address] [I48.0]  Long term (current) use of anticoagulants [Z79.01]             Anticoagulation Episode Summary     INR check location:       Preferred lab:       Send INR reminders to:       Comments:         Anticoagulation Care Providers     Provider Role Specialty Phone number    Lenny Durán MD Responsible Cardiology [phone]          Future Appointments   Date Time Provider Micheal Sepulveda   2019  9:00 AM COUMADIN,  [address]   2019  9:00 AM COUMADIN,  [address]   2019  9:40 AM Lenny Durán  [address]

## 2019-06-26 ENCOUNTER — ANTI-COAG VISIT (OUTPATIENT)
Dept: CARDIOLOGY CLINIC | Age: 69
End: 2019-06-26

## 2019-06-26 DIAGNOSIS — Z79.01 LONG TERM (CURRENT) USE OF ANTICOAGULANTS: ICD-10-CM

## 2019-06-26 DIAGNOSIS — I48.0 PAROXYSMAL ATRIAL FIBRILLATION (HCC): ICD-10-CM

## 2019-06-26 LAB
INR BLD: 2.2
PT POC: NORMAL SECONDS
VALID INTERNAL CONTROL?: YES

## 2019-06-26 NOTE — PROGRESS NOTES
A full discussion of the nature of anticoagulants has been carried out. A benefit risk analysis has been presented to the patient, so that they understand the justification for choosing anticoagulation at this time. The need for frequent and regular monitoring, precise dosage adjustment and compliance is stressed. Side effects of potential bleeding are discussed. The patient should avoid any OTC items containing aspirin or ibuprofen, and should avoid great swings in general diet. Avoid alcohol consumption. Call if any signs of abnormal bleeding. Next PT/INR test in 1 month. Patient reports starting a new multi vitamin about 2 weeks ago. Continue current dosing regimen. Patient verbalized understanding      Anticoagulation Summary  As of 2019    INR goal:   2.0-3.0   TTR:   74.8 % (1.7 y)   INR used for dosin.2 (2019)   Warfarin maintenance plan:   7.5 mg (5 mg x 1.5) every day   Weekly warfarin total:   52.5 mg   Plan last modified:   Nathaniel Stephenson RN (2018)   Next INR check:   2019   Target end date:    Indefinite    Indications    Paroxysmal atrial fibrillation ([facility] [address] [I48.0]  Long term (current) use of anticoagulants [Z79.01]             Anticoagulation Episode Summary     INR check location:       Preferred lab:       Send INR reminders to:       Comments:         Anticoagulation Care Providers     Provider Role Specialty Phone number    Alyssa Nowak MD [facility] Cardiology [phone]          Future Appointments   Date Time Provider Micheal Sepulveda   2019  9:00 AM COUMADIN,  [address]   2019  9:00 AM COUMADIN,  [address]   2019  9:40 AM Alyssa Nowak  [address]

## 2019-07-22 DIAGNOSIS — I48.0 PAROXYSMAL ATRIAL FIBRILLATION (HCC): ICD-10-CM

## 2019-07-22 DIAGNOSIS — Z79.01 LONG TERM (CURRENT) USE OF ANTICOAGULANTS: ICD-10-CM

## 2019-07-23 RX ORDER — WARFARIN SODIUM 5 MG/1
TABLET ORAL
Qty: 135 TAB | Refills: 0 | Status: SHIPPED | OUTPATIENT
Start: 2019-07-23 | End: 2019-10-19 | Stop reason: SDUPTHER

## 2019-07-23 NOTE — TELEPHONE ENCOUNTER
Requested Prescriptions     Signed Prescriptions Disp Refills    warfarin (COUMADIN) 5 mg tablet 135 Tab 0     Sig: TAKE 1 & 1/2 (ONE & ONE-HALF) TABLETS BY MOUTH ONCE DAILY OR AS DIRECTED BY COUMADIN CLINIC     Authorizing Provider: Norberto Saleem     Ordering User: Nathaly Parker       Last office visit 3/25/19    Per Dr. Shannon Weinberg   Date Time Provider Micheal Sepulveda   7/31/2019  9:00 AM COUMADIN, [address]   9/26/2019  9:40 AM Hugo Li  [address]

## 2019-07-31 ENCOUNTER — ANTI-COAG VISIT (OUTPATIENT)
Dept: CARDIOLOGY CLINIC | Age: 69
End: 2019-07-31

## 2019-07-31 DIAGNOSIS — I48.0 PAROXYSMAL ATRIAL FIBRILLATION (HCC): ICD-10-CM

## 2019-07-31 DIAGNOSIS — Z79.01 LONG TERM (CURRENT) USE OF ANTICOAGULANTS: ICD-10-CM

## 2019-07-31 LAB
INR BLD: 2
PT POC: NORMAL
VALID INTERNAL CONTROL?: YES

## 2019-07-31 NOTE — PROGRESS NOTES
A full discussion of the nature of anticoagulants has been carried out. A benefit risk analysis has been presented to the patient, so that they understand the justification for choosing anticoagulation at this time. The need for frequent and regular monitoring, precise dosage adjustment and compliance is stressed. Side effects of potential bleeding are discussed. The patient should avoid any OTC items containing aspirin or ibuprofen, and should avoid great swings in general diet. Avoid alcohol consumption. Call if any signs of abnormal bleeding. Next PT/INR test in 1 month. Continue current dosing regimen. Patient verbalized understanding      Anticoagulation Summary  As of 2019    INR goal:   2.0-3.0   TTR:   76.2 % (1.8 y)   INR used for dosin.0 (2019)   Warfarin maintenance plan:   7.5 mg (5 mg x 1.5) every day   Weekly warfarin total:   52.5 mg   Plan last modified:   Reba Ritter RN (2018)   Next INR check:   2019   Target end date:    Indefinite    Indications    Paroxysmal atrial fibrillation ([facility] [address] [I48.0]  Long term (current) use of anticoagulants [Z79.01]             Anticoagulation Episode Summary     INR check location:       Preferred lab:       Send INR reminders to:       Comments:         Anticoagulation Care Providers     Provider Role Specialty Phone number    Candi Peterson MD [facility] Cardiology [phone]          Future Appointments   Date Time Provider Micheal Sepulveda   2019  9:00 AM COUMADIN, [address]   2019  8:20 AM COUMADIN, [address]   2019  9:40 AM Candi Peterson  [address]

## 2019-09-04 ENCOUNTER — ANTI-COAG VISIT (OUTPATIENT)
Dept: CARDIOLOGY CLINIC | Age: 69
End: 2019-09-04

## 2019-09-04 DIAGNOSIS — I48.0 PAROXYSMAL ATRIAL FIBRILLATION (HCC): ICD-10-CM

## 2019-09-04 DIAGNOSIS — Z79.01 LONG TERM (CURRENT) USE OF ANTICOAGULANTS: ICD-10-CM

## 2019-09-04 LAB
INR BLD: 2
PT POC: NORMAL
VALID INTERNAL CONTROL?: YES

## 2019-09-04 NOTE — PROGRESS NOTES
A full discussion of the nature of anticoagulants has been carried out. A benefit risk analysis has been presented to the patient, so that they understand the justification for choosing anticoagulation at this time. The need for frequent and regular monitoring, precise dosage adjustment and compliance is stressed. Side effects of potential bleeding are discussed. The patient should avoid any OTC items containing aspirin or ibuprofen, and should avoid great swings in general diet. Avoid alcohol consumption. Call if any signs of abnormal bleeding. Next PT/INR test in 1 month with Dr. SINGH [facility] appt. Continue current dosing regimen. Patient verbalized understanding      Anticoagulation Summary  As of 2019    INR goal:   2.0-3.0   TTR:   77.3 % (1.9 y)   INR used for dosin.0 (2019)   Warfarin maintenance plan:   7.5 mg (5 mg x 1.5) every day   Weekly warfarin total:   52.5 mg   Plan last modified:   Lyndsey De RN (2018)   Next INR check:   10/1/2019   Target end date:    Indefinite    Indications    Paroxysmal atrial fibrillation ([facility] [address] [I48.0]  Long term (current) use of anticoagulants [Z79.01]             Anticoagulation Episode Summary     INR check location:       Preferred lab:       Send INR reminders to:       Comments:         Anticoagulation Care Providers     Provider Role Specialty Phone number    Candy Graff MD [facility] Cardiology [phone]          Future Appointments   Date Time Provider Micheal Sepulveda   2019  8:20 AM COUMADIN, [address]   10/1/2019  9:00 AM Candy Graff  [address]   10/1/2019  9:20 AM COUMADIN, [address]

## 2019-09-24 DIAGNOSIS — I48.0 PAROXYSMAL ATRIAL FIBRILLATION (HCC): ICD-10-CM

## 2019-09-24 RX ORDER — CARVEDILOL 25 MG/1
25 TABLET ORAL 2 TIMES DAILY WITH MEALS
Qty: 60 TAB | Refills: 5 | Status: SHIPPED | OUTPATIENT
Start: 2019-09-24 | End: 2020-03-23

## 2019-10-01 ENCOUNTER — TELEPHONE (OUTPATIENT)
Dept: CARDIOLOGY CLINIC | Age: 69
End: 2019-10-01

## 2019-10-01 ENCOUNTER — OFFICE VISIT (OUTPATIENT)
Dept: CARDIOLOGY CLINIC | Age: 69
End: 2019-10-01

## 2019-10-01 ENCOUNTER — ANTI-COAG VISIT (OUTPATIENT)
Dept: CARDIOLOGY CLINIC | Age: 69
End: 2019-10-01

## 2019-10-01 VITALS
HEIGHT: 70 IN | OXYGEN SATURATION: 97 % | DIASTOLIC BLOOD PRESSURE: 82 MMHG | WEIGHT: 254.2 LBS | SYSTOLIC BLOOD PRESSURE: 116 MMHG | RESPIRATION RATE: 16 BRPM | BODY MASS INDEX: 36.39 KG/M2 | HEART RATE: 91 BPM

## 2019-10-01 DIAGNOSIS — Z79.01 LONG TERM (CURRENT) USE OF ANTICOAGULANTS: ICD-10-CM

## 2019-10-01 DIAGNOSIS — R00.2 PALPITATION: ICD-10-CM

## 2019-10-01 DIAGNOSIS — I48.0 PAROXYSMAL ATRIAL FIBRILLATION (HCC): Primary | ICD-10-CM

## 2019-10-01 DIAGNOSIS — I48.0 PAROXYSMAL ATRIAL FIBRILLATION (HCC): ICD-10-CM

## 2019-10-01 DIAGNOSIS — I10 ESSENTIAL HYPERTENSION: ICD-10-CM

## 2019-10-01 DIAGNOSIS — E78.2 MIXED HYPERLIPIDEMIA: ICD-10-CM

## 2019-10-01 DIAGNOSIS — I25.10 CORONARY ARTERY DISEASE INVOLVING NATIVE CORONARY ARTERY OF NATIVE HEART WITHOUT ANGINA PECTORIS: ICD-10-CM

## 2019-10-01 LAB
INR BLD: 2.1
PT POC: NORMAL
VALID INTERNAL CONTROL?: YES

## 2019-10-01 RX ORDER — ATORVASTATIN CALCIUM 40 MG/1
40 TABLET, FILM COATED ORAL
Qty: 90 TAB | Refills: 1 | Status: SHIPPED | OUTPATIENT
Start: 2019-10-01 | End: 2020-03-30

## 2019-10-01 NOTE — PROGRESS NOTES
A full discussion of the nature of anticoagulants has been carried out. A benefit risk analysis has been presented to the patient, so that they understand the justification for choosing anticoagulation at this time. The need for frequent and regular monitoring, precise dosage adjustment and compliance is stressed. Side effects of potential bleeding are discussed. The patient should avoid any OTC items containing aspirin or ibuprofen, and should avoid great swings in general diet. Avoid alcohol consumption. Call if any signs of abnormal bleeding. Next PT/INR test in 1 month. Continue current dosing regimen. Patient verbalized understanding      Anticoagulation Summary  As of 10/1/2019    INR goal:   2.0-3.0   TTR:   78.2 % (2 y)   INR used for dosin.1 (10/1/2019)   Warfarin maintenance plan:   7.5 mg (5 mg x 1.5) every day   Weekly warfarin total:   52.5 mg   Plan last modified:   Pricilla Almeida RN (2018)   Next INR check:   2019   Target end date:    Indefinite    Indications    Paroxysmal atrial fibrillation ([facility] [address] [I48.0]  Long term (current) use of anticoagulants [Z79.01]             Anticoagulation Episode Summary     INR check location:       Preferred lab:       Send INR reminders to:       Comments:         Anticoagulation Care Providers     Provider Role Specialty Phone number    Mariah Grullon MD [facility] [phone]          Future Appointments   Date Time Provider Micheal Sepulveda   10/1/2019  9:20 AM COUMADIN,  [address]   2019  9:00 AM COUMADIN,  [address]

## 2019-10-01 NOTE — TELEPHONE ENCOUNTER
Faxed records request for lab results to Dr. Ysabel Reed office. Results are on Dr. Merrilee Carrel desk for review.

## 2019-10-01 NOTE — PROGRESS NOTES
HISTORY OF PRESENT ILLNESS  Maura Romero is a 78 y.o. male     SUMMARY:   Problem List  Date Reviewed: 9/30/2019          Codes Class Noted    Severe obesity ([facility] ICD-10-CM: E66.01  ICD-9-CM: 278.01  3/25/2019        Long term (current) use of anticoagulants ICD-10-CM: Z79.01  ICD-9-CM: V58.61  11/12/2018        Paroxysmal atrial fibrillation ([facility] ICD-10-CM: I48.0  ICD-9-CM: 427.31  3/7/2017        Type 2 diabetes mellitus with hyperglycemia, without long-term current use of insulin ([facility] ICD-10-CM: E11.65  ICD-9-CM: 250.00, 790.29  2/23/2017        PVC (premature ventricular contraction) ICD-10-CM: I49.3  ICD-9-CM: 427.69  9/4/2014        HTN (hypertension) ICD-10-CM: I10  ICD-9-CM: 401.9  7/21/2014        Hyperlipidemia ICD-10-CM: E78.5  ICD-9-CM: 272.4  7/21/2014        CAD (coronary artery disease) ICD-10-CM: I25.10  ICD-9-CM: 414.00  7/21/2014    Overview Addendum 9/4/2014 11:55 AM by Easton Larson MD     12/12 abnormal stress test [place], then bms to ramus, mod distal disease, normal lvef  60-70% distal pda and lad. Current Outpatient Medications on File Prior to Visit   Medication Sig    carvedilol (COREG) 25 mg tablet Take 1 Tab by mouth two (2) times daily (with meals).  warfarin (COUMADIN) 5 mg tablet TAKE 1 & 1/2 (ONE & ONE-HALF) TABLETS BY MOUTH ONCE DAILY OR AS DIRECTED BY COUMADIN CLINIC    MAGNESIUM PO Take 400 mg by mouth.  warfarin (COUMADIN) 7.5 mg tablet Take 1 Tab by mouth daily. (Patient taking differently: Take 5 mg by mouth daily. Not taking)    omeprazole (PRILOSEC) 20 mg capsule Take 20 mg by mouth daily.  atorvastatin (LIPITOR) 40 mg tablet Take 1 Tab by mouth nightly.  metFORMIN (GLUCOPHAGE) 500 mg tablet Take 1,000 mg by mouth two (2) times daily (with meals). No current facility-administered medications on file prior to visit.         CARDIOLOGY STUDIES TO DATE:  12/12 exercise cardiolyte apical ischemia, lvef 61%  12/12 normal echo    Chief Complaint   Patient presents with    Coronary Artery Disease     HPI :  Mr. Marroquin is doing fine. Primary care physician is following his lipids and his A1c and he last had blood work a couple of months ago. He is not walking as much as he did and he put on some weight. He has not felt any atrial fibrillation and no problems with his medications. CARDIAC ROS:   negative for chest pain, dyspnea, palpitations, syncope, orthopnea, paroxysmal nocturnal dyspnea, exertional chest pressure/discomfort, claudication, lower extremity edema    Family History   Problem Relation Age of Onset    Heart Disease Father        Past Medical History:   Diagnosis Date    Diverticulosis     Duodenal ulcer        GENERAL ROS:  A comprehensive review of systems was negative except for that written in the HPI.     Visit Vitals  /82 (BP 1 Location: Left arm, BP Patient Position: Sitting)   Pulse 91   Resp 16   Ht 5' 10\" (1.778 m)   Wt 254 lb 3.2 oz (115.3 kg)   SpO2 97%   BMI 36.47 kg/m²       Wt Readings from Last 3 Encounters:   10/01/19 254 lb 3.2 oz (115.3 kg)   03/25/19 245 lb 9.6 oz (111.4 kg)   09/24/18 239 lb (108.4 kg)            BP Readings from Last 3 Encounters:   10/01/19 116/82   03/25/19 122/86   09/24/18 118/80       PHYSICAL EXAM  General appearance: alert, cooperative, no distress, appears stated age  Neurologic: Alert and oriented X 3  Neck: supple, symmetrical, trachea midline, no adenopathy, no carotid bruit and no JVD  Lungs: clear to auscultation bilaterally  Heart: regular rate and rhythm, S1, S2 normal, no murmur, click, rub or gallop  Extremities: extremities normal, atraumatic, no cyanosis or edema    Lab Results   Component Value Date/Time    Cholesterol, total 161 09/21/2017 10:14 AM    Cholesterol, total 148 02/23/2017 11:14 AM    Cholesterol, total 151 08/25/2016 10:37 AM    Cholesterol, total 141 03/03/2016 11:42 AM    Cholesterol, total 164 09/03/2015 11:46 AM    HDL Cholesterol 34 (L) 09/21/2017 10:14 AM    HDL Cholesterol 31 (L) 02/23/2017 11:14 AM    HDL Cholesterol 29 (L) 08/25/2016 10:37 AM    HDL Cholesterol 28 (L) 03/03/2016 11:42 AM    HDL Cholesterol 32 (L) 09/03/2015 11:46 AM    LDL, calculated 93 09/21/2017 10:14 AM    LDL, calculated 84 02/23/2017 11:14 AM    LDL, calculated 93 08/25/2016 10:37 AM    LDL, calculated 91 03/03/2016 11:42 AM    LDL, calculated 102 (H) 09/03/2015 11:46 AM    Triglyceride 171 (H) 09/21/2017 10:14 AM    Triglyceride 166 (H) 02/23/2017 11:14 AM    Triglyceride 145 08/25/2016 10:37 AM    Triglyceride 111 03/03/2016 11:42 AM    Triglyceride 152 (H) 09/03/2015 11:46 AM     ASSESSMENT  Mr. Jaylon Aguirre is stable and asymptomatic, well compensated on a good medical regimen and needs no cardiac testing at this time. We will get a copy of his most recent blood work. current treatment plan is effective, no change in therapy  lab results and schedule of future lab studies reviewed with patient  reviewed diet, exercise and weight control    Encounter Diagnoses   Name Primary?  Paroxysmal atrial fibrillation (HCC) Yes    Mixed hyperlipidemia     Coronary artery disease involving native coronary artery of native heart without angina pectoris     Long term (current) use of anticoagulants     Essential hypertension      No orders of the defined types were placed in this encounter. Follow-up and Dispositions    · Return in about 6 months (around 4/1/2020).          Mary Kate Meadows MD  10/1/2019

## 2019-10-19 DIAGNOSIS — I48.0 PAROXYSMAL ATRIAL FIBRILLATION (HCC): ICD-10-CM

## 2019-10-19 DIAGNOSIS — Z79.01 LONG TERM (CURRENT) USE OF ANTICOAGULANTS: ICD-10-CM

## 2019-10-22 RX ORDER — WARFARIN SODIUM 5 MG/1
TABLET ORAL
Qty: 135 TAB | Refills: 0 | Status: SHIPPED | OUTPATIENT
Start: 2019-10-22 | End: 2020-01-20

## 2019-10-22 NOTE — TELEPHONE ENCOUNTER
Requested Prescriptions     Signed Prescriptions Disp Refills    warfarin (COUMADIN) 5 mg tablet 135 Tab 0     Sig: TAKE 1 & 1/2 (ONE & ONE-HALF) TABLETS BY MOUTH ONCE DAILY OR AS DIRECTED BY COUMADIN CLINIC     Authorizing Provider: Lord Dumas     Ordering User: Jessica Monroy       Last office visit 10/1/19    Per Dr. Flores Many   Date Time Provider Micheal Sepulveda   11/5/2019  9:00 AM COUMADIN, [address] [facility]   4/2/2020  9:00 AM Janee Marmolejo  [address]

## 2019-11-05 ENCOUNTER — ANTI-COAG VISIT (OUTPATIENT)
Dept: CARDIOLOGY CLINIC | Age: 69
End: 2019-11-05

## 2019-11-05 DIAGNOSIS — I48.0 PAROXYSMAL ATRIAL FIBRILLATION (HCC): ICD-10-CM

## 2019-11-05 DIAGNOSIS — Z79.01 LONG TERM (CURRENT) USE OF ANTICOAGULANTS: ICD-10-CM

## 2019-11-05 LAB
INR BLD: 2.5
PT POC: NORMAL
VALID INTERNAL CONTROL?: YES

## 2019-11-05 NOTE — PROGRESS NOTES
A full discussion of the nature of anticoagulants has been carried out. A benefit risk analysis has been presented to the patient, so that they understand the justification for choosing anticoagulation at this time. The need for frequent and regular monitoring, precise dosage adjustment and compliance is stressed. Side effects of potential bleeding are discussed. The patient should avoid any OTC items containing aspirin or ibuprofen, and should avoid great swings in general diet. Avoid alcohol consumption. Call if any signs of abnormal bleeding. Next PT/INR test in 1 month. Continue current dosing regimen. Patient verbalized understanding      Anticoagulation Summary  As of 2019    INR goal:   2.0-3.0   TTR:   79.2 % (2.1 y)   INR used for dosin.5 (2019)   Warfarin maintenance plan:   7.5 mg (5 mg x 1.5) every day   Weekly warfarin total:   52.5 mg   Plan last modified:   Rob Bazan, RN (2018)   Next INR check:      Target end date:    Indefinite    Indications    Paroxysmal atrial fibrillation ([address] [I48.0]  Long term (current) use of anticoagulants [Z79.01]             Anticoagulation Episode Summary     INR check location:       Preferred lab:       Send INR reminders to:       Comments:         Anticoagulation Care Providers     Provider Role Specialty Phone number    Ventura Bai MD Responsible Cardiology [phone]          Future Appointments   Date Time Provider Micheal Sepulveda   12/10/2019  9:40 AM COUMADIN, [address]   2020  9:00 AM Ventura Bai  [address]

## 2019-12-10 ENCOUNTER — ANTI-COAG VISIT (OUTPATIENT)
Dept: CARDIOLOGY CLINIC | Age: 69
End: 2019-12-10

## 2019-12-10 DIAGNOSIS — Z79.01 LONG TERM (CURRENT) USE OF ANTICOAGULANTS: ICD-10-CM

## 2019-12-10 DIAGNOSIS — I48.0 PAROXYSMAL ATRIAL FIBRILLATION (HCC): ICD-10-CM

## 2019-12-10 LAB
INR BLD: 2.2
PT POC: NORMAL
VALID INTERNAL CONTROL?: YES

## 2019-12-10 NOTE — PROGRESS NOTES
A full discussion of the nature of anticoagulants has been carried out. A benefit risk analysis has been presented to the patient, so that they understand the justification for choosing anticoagulation at this time. The need for frequent and regular monitoring, precise dosage adjustment and compliance is stressed. Side effects of potential bleeding are discussed. The patient should avoid any OTC items containing aspirin or ibuprofen, and should avoid great swings in general diet. Avoid alcohol consumption. Call if any signs of abnormal bleeding. Next PT/INR test in 1 month. Continue current dosing regimen. Patient verbalized understanding      Anticoagulation Summary  As of 12/10/2019    INR goal:   2.0-3.0   TTR:   80.1 % (2.2 y)   INR used for dosin.2 (12/10/2019)   Warfarin maintenance plan:   7.5 mg (5 mg x 1.5) every day   Weekly warfarin total:   52.5 mg   Plan last modified:   Payton Suárez RN (2018)   Next INR check:      Target end date:    Indefinite    Indications    Paroxysmal atrial fibrillation (HCC) [I48.0]  Long term (current) use of anticoagulants [Z79.01]             Anticoagulation Episode Summary     INR check location:       Preferred lab:       Send INR reminders to:       Comments:         Anticoagulation Care Providers     Provider Role Specialty Phone number    Ehsan Brady MD Responsible Cardiology [phone]          Future Appointments   Date Time Provider Micheal Sepulveda   2020  9:00 AM Ehsan Brady  [address]

## 2020-01-14 ENCOUNTER — ANTI-COAG VISIT (OUTPATIENT)
Dept: CARDIOLOGY CLINIC | Age: 70
End: 2020-01-14

## 2020-01-14 DIAGNOSIS — I48.0 PAROXYSMAL ATRIAL FIBRILLATION (HCC): ICD-10-CM

## 2020-01-14 DIAGNOSIS — Z79.01 LONG TERM (CURRENT) USE OF ANTICOAGULANTS: ICD-10-CM

## 2020-01-14 LAB
INR BLD: 2.4
PT POC: NORMAL
VALID INTERNAL CONTROL?: YES

## 2020-01-14 NOTE — PROGRESS NOTES
A full discussion of the nature of anticoagulants has been carried out. A benefit risk analysis has been presented to the patient, so that they understand the justification for choosing anticoagulation at this time. The need for frequent and regular monitoring, precise dosage adjustment and compliance is stressed. Side effects of potential bleeding are discussed. The patient should avoid any OTC items containing aspirin or ibuprofen, and should avoid great swings in general diet. Avoid alcohol consumption. Call if any signs of abnormal bleeding. Next PT/INR test in 1 month. Continue current dosing regimen. Patient verbalized understanding      Anticoagulation Summary  As of 2020    INR goal:   2.0-3.0   TTR:   80.9 % (2.3 y)   INR used for dosin.4 (2020)   Warfarin maintenance plan:   7.5 mg (5 mg x 1.5) every day   Weekly warfarin total:   52.5 mg   Plan last modified:   Traci Owens RN (2018)   Next INR check:   2020   Target end date:    Indefinite    Indications    Paroxysmal atrial fibrillation ([address] [I48.0]  Long term (current) use of anticoagulants [Z79.01]             Anticoagulation Episode Summary     INR check location:       Preferred lab:       Send INR reminders to:       Comments:         Anticoagulation Care Providers     Provider Role Specialty Phone number    Eduard Mcnamara MD Responsible Cardiology [phone]          Future Appointments   Date Time Provider Micheal Sepulveda   2020  9:40 AM COUMADIN,  [address]   2020  9:00 AM Eduard Mcnamara  [address]

## 2020-01-20 DIAGNOSIS — Z79.01 LONG TERM (CURRENT) USE OF ANTICOAGULANTS: ICD-10-CM

## 2020-01-20 DIAGNOSIS — I48.0 PAROXYSMAL ATRIAL FIBRILLATION (HCC): ICD-10-CM

## 2020-01-20 RX ORDER — WARFARIN SODIUM 5 MG/1
TABLET ORAL
Qty: 135 TAB | Refills: 0 | Status: SHIPPED | OUTPATIENT
Start: 2020-01-20 | End: 2020-04-20

## 2020-01-20 NOTE — TELEPHONE ENCOUNTER
Requested Prescriptions     Signed Prescriptions Disp Refills    warfarin (COUMADIN) 5 mg tablet 135 Tab 0     Sig: TAKE 1 & 1/2 (ONE & ONE-HALF) TABLETS BY MOUTH ONCE DAILY AS DIRECTED OR AS DIRECTED BY COUMADIN CLINIC     Authorizing Provider: Zhane Smith     Ordering User: Franca Anna    Per Dr. Choi Post verbal orders     Future Appointments   Date Time Provider Micheal Sepulveda   2/18/2020  9[facility]40 [facility] [address] [facility]   4/2/2020  9:00 AM Fitz Mcintosh  [address]

## 2020-02-18 ENCOUNTER — ANTI-COAG VISIT (OUTPATIENT)
Dept: CARDIOLOGY CLINIC | Age: 70
End: 2020-02-18

## 2020-02-18 DIAGNOSIS — Z79.01 LONG TERM (CURRENT) USE OF ANTICOAGULANTS: ICD-10-CM

## 2020-02-18 DIAGNOSIS — I48.0 PAROXYSMAL ATRIAL FIBRILLATION (HCC): ICD-10-CM

## 2020-02-18 LAB
INR BLD: 2.6
PT POC: 30.7 SECONDS
VALID INTERNAL CONTROL?: YES

## 2020-02-18 NOTE — PROGRESS NOTES
A full discussion of the nature of anticoagulants has been carried out. A benefit risk analysis has been presented to the patient, so that they understand the justification for choosing anticoagulation at this time. The need for frequent and regular monitoring, precise dosage adjustment and compliance is stressed. Side effects of potential bleeding are discussed. The patient should avoid any OTC items containing aspirin or ibuprofen, and should avoid great swings in general diet. Avoid alcohol consumption. Call if any signs of abnormal bleeding. Next PT/INR test on 4-2-20 with Dr. Jesus Bryan appt and due to patient going out of town. Reports no change in diet or medications. No change in dosing.

## 2020-03-23 DIAGNOSIS — I48.0 PAROXYSMAL ATRIAL FIBRILLATION (HCC): ICD-10-CM

## 2020-03-23 RX ORDER — CARVEDILOL 25 MG/1
TABLET ORAL
Qty: 60 TAB | Refills: 0 | Status: SHIPPED | OUTPATIENT
Start: 2020-03-23 | End: 2020-04-21

## 2020-03-30 ENCOUNTER — TELEPHONE (OUTPATIENT)
Dept: CARDIOLOGY CLINIC | Age: 70
End: 2020-03-30

## 2020-03-30 DIAGNOSIS — R00.2 PALPITATION: ICD-10-CM

## 2020-03-30 RX ORDER — ATORVASTATIN CALCIUM 40 MG/1
TABLET, FILM COATED ORAL
Qty: 90 TAB | Refills: 0 | Status: SHIPPED | OUTPATIENT
Start: 2020-03-30 | End: 2020-06-29

## 2020-03-30 NOTE — TELEPHONE ENCOUNTER
Requested Prescriptions     Signed Prescriptions Disp Refills    atorvastatin (LIPITOR) 40 mg tablet 90 Tab 0     Sig: TAKE ONE TABLET BY MOUTH  NIGHTLY     Authorizing Provider: Bob Olsen     Ordering User: Rama Seals    Per Dr. Joaquín Adorno verbal orders

## 2020-03-31 ENCOUNTER — ANTI-COAG VISIT (OUTPATIENT)
Dept: CARDIOLOGY CLINIC | Age: 70
End: 2020-03-31

## 2020-03-31 DIAGNOSIS — Z79.01 LONG TERM (CURRENT) USE OF ANTICOAGULANTS: ICD-10-CM

## 2020-03-31 DIAGNOSIS — I48.0 PAROXYSMAL ATRIAL FIBRILLATION (HCC): ICD-10-CM

## 2020-03-31 LAB
INR BLD: 2.1
PT POC: NORMAL
VALID INTERNAL CONTROL?: YES

## 2020-03-31 NOTE — PROGRESS NOTES
A full discussion of the nature of anticoagulants has been carried out. A benefit risk analysis has been presented to the patient, so that they understand the justification for choosing anticoagulation at this time. The need for frequent and regular monitoring, precise dosage adjustment and compliance is stressed. Side effects of potential bleeding are discussed. The patient should avoid any OTC items containing aspirin or ibuprofen, and should avoid great swings in general diet. Avoid alcohol consumption. Call if any signs of abnormal bleeding. Next PT/INR test in 1 month. Continue current dosing regimen. Patient verbalized understanding      Anticoagulation Summary  As of 3/31/2020    INR goal:   2.0-3.0   TTR:   82.5 % (2.5 y)   INR used for dosing:      Plan last modified:   Karlie Adames RN (8/24/2018)   Next INR check:      Target end date:    Indefinite    Indications    Paroxysmal atrial fibrillation ([facility] [address] [I48.0]  Long term (current) use of anticoagulants [Z79.01]             Anticoagulation Episode Summary     INR check location:       Preferred lab:       Send INR reminders to:       Comments:         Anticoagulation Care Providers     Provider Role Specialty Phone number    Allyssa Anguiano MD Responsible Cardiology [phone]          Future Appointments   Date Time Provider Micheal Sepulveda   3/31/2020  2:40 PM COUMADIN, [address]   5/5/2020  3:00 PM Allyssa Anguiano  [address]

## 2020-04-20 DIAGNOSIS — I48.0 PAROXYSMAL ATRIAL FIBRILLATION (HCC): ICD-10-CM

## 2020-04-20 DIAGNOSIS — Z79.01 LONG TERM (CURRENT) USE OF ANTICOAGULANTS: ICD-10-CM

## 2020-04-20 RX ORDER — WARFARIN SODIUM 5 MG/1
TABLET ORAL
Qty: 135 TAB | Refills: 0 | Status: SHIPPED | OUTPATIENT
Start: 2020-04-20 | End: 2020-07-20

## 2020-04-21 DIAGNOSIS — I48.0 PAROXYSMAL ATRIAL FIBRILLATION (HCC): ICD-10-CM

## 2020-04-21 RX ORDER — CARVEDILOL 25 MG/1
TABLET ORAL
Qty: 60 TAB | Refills: 2 | Status: SHIPPED | OUTPATIENT
Start: 2020-04-21 | End: 2020-04-23 | Stop reason: SDUPTHER

## 2020-04-21 NOTE — TELEPHONE ENCOUNTER
Requested Prescriptions     Signed Prescriptions Disp Refills    carvediloL (COREG) 25 mg tablet 60 Tab 2     Sig: TAKE 1 TABLET BY MOUTH TWICE DAILY WITH MEALS     Authorizing Provider: Miah Logan     Ordering User: Brianne Staton    Per Dr. Vicki Whalen verbal orders

## 2020-04-22 RX ORDER — CARVEDILOL 25 MG/1
TABLET ORAL
Qty: 60 TAB | Refills: 0 | OUTPATIENT
Start: 2020-04-22

## 2020-04-23 RX ORDER — CARVEDILOL 25 MG/1
TABLET ORAL
Qty: 180 TAB | Refills: 1 | Status: SHIPPED | OUTPATIENT
Start: 2020-04-23 | End: 2020-10-12

## 2020-04-23 NOTE — TELEPHONE ENCOUNTER
Requested Prescriptions     Signed Prescriptions Disp Refills    carvediloL (COREG) 25 mg tablet 180 Tab 1     Sig: TAKE 1 TABLET BY MOUTH TWICE DAILY WITH MEALS     Authorizing Provider: Nicholas Damico     Ordering User: Tatiana Melgoza     Refused Prescriptions Disp Refills    carvediloL (COREG) 25 mg tablet [Pharmacy Med Name: Carvedilol 25 MG Oral Tablet] 60 Tab 0     Sig: TAKE 1 TABLET BY MOUTH TWICE DAILY WITH MEALS     Refused By: Jonathan Solitario     Reason for Refusal: Request already responded to by other means (e.g. phone or fax)      Per Dr. Bishop Walker verbal orders - sent refill again (maybe Yasmine's refusal knocked out the previous approved refill)

## 2020-04-23 NOTE — TELEPHONE ENCOUNTER
Patient states [facility] is saying they did not receive his prescription for Carvedilol on 4/21. He states he is completely out and needs a refill today. Please advise.      Phone: [phone]

## 2020-05-04 ENCOUNTER — ANTI-COAG VISIT (OUTPATIENT)
Dept: CARDIOLOGY CLINIC | Age: 70
End: 2020-05-04

## 2020-05-04 DIAGNOSIS — I48.0 PAROXYSMAL ATRIAL FIBRILLATION (HCC): ICD-10-CM

## 2020-05-04 DIAGNOSIS — Z79.01 LONG TERM (CURRENT) USE OF ANTICOAGULANTS: ICD-10-CM

## 2020-05-04 LAB
INR BLD: 2.9
PT POC: NORMAL
VALID INTERNAL CONTROL?: YES

## 2020-05-04 NOTE — PROGRESS NOTES
A full discussion of the nature of anticoagulants has been carried out. A benefit risk analysis has been presented to the patient, so that they understand the justification for choosing anticoagulation at this time. The need for frequent and regular monitoring, precise dosage adjustment and compliance is stressed. Side effects of potential bleeding are discussed. The patient should avoid any OTC items containing aspirin or ibuprofen, and should avoid great swings in general diet. Avoid alcohol consumption. Call if any signs of abnormal bleeding. Next PT/INR test in 1 month. Continue current dosing regimen. Patient verbalized understanding      Anticoagulation Summary  As of 2020    INR goal:   2.0-3.0   TTR:   83.2 % (2.6 y)   INR used for dosin.9 (2020)   Warfarin maintenance plan:   7.5 mg (5 mg x 1.5) every day   Weekly warfarin total:   52.5 mg   Plan last modified:   Tamara Pedro RN (2018)   Next INR check:   6/3/2020   Target end date:    Indefinite    Indications    Paroxysmal atrial fibrillation ([address] [I48.0]  Long term (current) use of anticoagulants [Z79.01]             Anticoagulation Episode Summary     INR check location:       Preferred lab:       Send INR reminders to:       Comments:         Anticoagulation Care Providers     Provider Role Specialty Phone number    Yessy Knox MD Responsible Cardiology [phone]          Future Appointments   Date Time Provider Micheal Sepulveda   2020  9:40 AM COUMADIN,  [address]   6/3/2020  9:40 AM COUMADIN,  [address]   2020  3:20 PM Yessy Knox  [address]

## 2020-06-02 ENCOUNTER — ANTI-COAG VISIT (OUTPATIENT)
Dept: CARDIOLOGY CLINIC | Age: 70
End: 2020-06-02

## 2020-06-02 DIAGNOSIS — I48.0 PAROXYSMAL ATRIAL FIBRILLATION (HCC): ICD-10-CM

## 2020-06-02 DIAGNOSIS — Z79.01 LONG TERM (CURRENT) USE OF ANTICOAGULANTS: ICD-10-CM

## 2020-06-02 LAB
INR BLD: 2.2
PT POC: NORMAL
VALID INTERNAL CONTROL?: YES

## 2020-06-02 NOTE — PROGRESS NOTES
A full discussion of the nature of anticoagulants has been carried out. A benefit risk analysis has been presented to the patient, so that they understand the justification for choosing anticoagulation at this time. The need for frequent and regular monitoring, precise dosage adjustment and compliance is stressed. Side effects of potential bleeding are discussed. The patient should avoid any OTC items containing aspirin or ibuprofen, and should avoid great swings in general diet. Avoid alcohol consumption. Call if any signs of abnormal bleeding. Next PT/INR test in 1 month with Dr. Ward Villalobos appt. Continue current dosing regimen. Patient verbalized understanding      Anticoagulation Summary  As of 2020    INR goal:   2.0-3.0   TTR:   83.7 % (2.7 y)   INR used for dosin.2 (2020)   Warfarin maintenance plan:   7.5 mg (5 mg x 1.5) every day   Weekly warfarin total:   52.5 mg   Plan last modified:   Navarro Grajeda RN (2018)   Next INR check:   2020   Target end date:    Indefinite    Indications    Paroxysmal atrial fibrillation ([facility] [address] [I48.0]  Long term (current) use of anticoagulants [Z79.01]             Anticoagulation Episode Summary     INR check location:       Preferred lab:       Send INR reminders to:       Comments:         Anticoagulation Care Providers     Provider Role Specialty Phone number    Carlita Guzman MD [facility] Cardiology [phone]          Future Appointments   Date Time Provider Micheal Sepulveda   2020  9:20 AM COUMADIN,  [address]   2020  3:20 PM Carlita Guzman  [address]

## 2020-06-29 DIAGNOSIS — R00.2 PALPITATION: ICD-10-CM

## 2020-06-29 RX ORDER — ATORVASTATIN CALCIUM 40 MG/1
TABLET, FILM COATED ORAL
Qty: 90 TAB | Refills: 0 | Status: SHIPPED | OUTPATIENT
Start: 2020-06-29 | End: 2020-09-16 | Stop reason: SDUPTHER

## 2020-07-07 ENCOUNTER — TELEPHONE (OUTPATIENT)
Dept: CARDIOLOGY CLINIC | Age: 70
End: 2020-07-07

## 2020-07-07 NOTE — TELEPHONE ENCOUNTER
Called and spoke with patient to remind of upcoming INR check for 7/9/2020. Patient reports he is in [place] and requests to cancel appt and reschedule for August.  Discussed with patient the policy to check INR once a month and risks involved with not knowing current levels.

## 2020-07-18 DIAGNOSIS — I48.0 PAROXYSMAL ATRIAL FIBRILLATION (HCC): ICD-10-CM

## 2020-07-18 DIAGNOSIS — Z79.01 LONG TERM (CURRENT) USE OF ANTICOAGULANTS: ICD-10-CM

## 2020-07-20 RX ORDER — WARFARIN SODIUM 5 MG/1
TABLET ORAL
Qty: 45 TAB | Refills: 0 | Status: SHIPPED | OUTPATIENT
Start: 2020-07-20 | End: 2020-08-21

## 2020-07-20 NOTE — TELEPHONE ENCOUNTER
Requested Prescriptions     Signed Prescriptions Disp Refills    warfarin (COUMADIN) 5 mg tablet 45 Tab 0     Sig: TAKE 1 & 1/2 (ONE & ONE-HALF) TABLETS BY MOUTH ONCE DAILY-USE AS DIRECTED OR AS DIRECTED BY COUMADIN CLINIC-NEEDS INR CHECK     Authorizing Provider: Florina Whitney     Ordering User: Alayna Santillan       Last office visit 10/1/19    Per Dr. Denis Plasencia   Date Time Provider Micheal Sepulveda   8/3/2020  9:40 AM COUMADIN, [address] [facility]   9/14/2020  1:20 PM Lynwood Ahumada,  [address]

## 2020-08-03 ENCOUNTER — ANTI-COAG VISIT (OUTPATIENT)
Dept: CARDIOLOGY CLINIC | Age: 70
End: 2020-08-03

## 2020-08-03 DIAGNOSIS — I48.0 PAROXYSMAL ATRIAL FIBRILLATION (HCC): ICD-10-CM

## 2020-08-03 DIAGNOSIS — Z79.01 LONG TERM (CURRENT) USE OF ANTICOAGULANTS: ICD-10-CM

## 2020-08-03 LAB
INR BLD: 2.6
PT POC: NORMAL
VALID INTERNAL CONTROL?: YES

## 2020-08-03 NOTE — PROGRESS NOTES
A full discussion of the nature of anticoagulants has been carried out. A benefit risk analysis has been presented to the patient, so that they understand the justification for choosing anticoagulation at this time. The need for frequent and regular monitoring, precise dosage adjustment and compliance is stressed. Side effects of potential bleeding are discussed. The patient should avoid any OTC items containing aspirin or ibuprofen, and should avoid great swings in general diet. Avoid alcohol consumption. Call if any signs of abnormal bleeding. Next PT/INR test in 1 month with Dr. Maryse Martinez appt. Continue current dosing regimen. Patient verbalized understanding      Anticoagulation Summary  As of 8/3/2020    INR goal:   2.0-3.0   TTR:   84.6 % (2.8 y)   INR used for dosin.6 (8/3/2020)   Warfarin maintenance plan:   7.5 mg (5 mg x 1.5) every day   Weekly warfarin total:   52.5 mg   Plan last modified:   Payton Suárez RN (2018)   Next INR check:   2020   Target end date:    Indefinite    Indications    Paroxysmal atrial fibrillation ([facility] [address] [I48.0]  Long term (current) use of anticoagulants [Z79.01]             Anticoagulation Episode Summary     INR check location:       Preferred lab:       Send INR reminders to:       Comments:         Anticoagulation Care Providers     Provider Role Specialty Phone number    Ehsan Brady MD [facility] Cardiology [phone]          Future Appointments   Date Time Provider Micheal Sepulveda   2020  1:20 PM MD MARIZOL Clancy AMB

## 2020-08-19 DIAGNOSIS — Z79.01 LONG TERM (CURRENT) USE OF ANTICOAGULANTS: ICD-10-CM

## 2020-08-19 DIAGNOSIS — I48.0 PAROXYSMAL ATRIAL FIBRILLATION (HCC): ICD-10-CM

## 2020-08-21 RX ORDER — WARFARIN SODIUM 5 MG/1
TABLET ORAL
Qty: 135 TAB | Refills: 0 | Status: SHIPPED | OUTPATIENT
Start: 2020-08-21 | End: 2020-11-20 | Stop reason: SDUPTHER

## 2020-08-21 NOTE — TELEPHONE ENCOUNTER
Requested Prescriptions     Signed Prescriptions Disp Refills    warfarin (COUMADIN) 5 mg tablet 135 Tab 0     Sig: TAKE 1 & 1/2 (ONE & ONE-HALF) TABLETS BY MOUTH ONCE DAILY.  USE AS DIRECTED OR AS DIRECTED BY COUMADIN CLINIC     Authorizing Provider: Harry Toure     Ordering User: Rachel Fang       Last office visit 10/1/2020    Per Dr. Winifred Eisenmenger   Date Time Provider Micheal Sepulveda   9/14/2020  1:20 PM MD MARIZOL Waller III [facility] AMB   9/14/2020  1:20 PM YOVANA MCGOVERN AMB

## 2020-09-16 ENCOUNTER — OFFICE VISIT (OUTPATIENT)
Dept: CARDIOLOGY CLINIC | Age: 70
End: 2020-09-16
Payer: MEDICARE

## 2020-09-16 ENCOUNTER — ANTI-COAG VISIT (OUTPATIENT)
Dept: CARDIOLOGY CLINIC | Age: 70
End: 2020-09-16
Payer: MEDICARE

## 2020-09-16 VITALS
HEART RATE: 102 BPM | HEIGHT: 70 IN | RESPIRATION RATE: 16 BRPM | OXYGEN SATURATION: 98 % | SYSTOLIC BLOOD PRESSURE: 110 MMHG | DIASTOLIC BLOOD PRESSURE: 70 MMHG | BODY MASS INDEX: 35.9 KG/M2 | WEIGHT: 250.8 LBS

## 2020-09-16 DIAGNOSIS — E11.65 TYPE 2 DIABETES MELLITUS WITH HYPERGLYCEMIA, WITHOUT LONG-TERM CURRENT USE OF INSULIN (HCC): ICD-10-CM

## 2020-09-16 DIAGNOSIS — E78.2 MIXED HYPERLIPIDEMIA: ICD-10-CM

## 2020-09-16 DIAGNOSIS — Z79.01 LONG TERM (CURRENT) USE OF ANTICOAGULANTS: ICD-10-CM

## 2020-09-16 DIAGNOSIS — I49.3 PVC (PREMATURE VENTRICULAR CONTRACTION): ICD-10-CM

## 2020-09-16 DIAGNOSIS — I48.0 PAROXYSMAL ATRIAL FIBRILLATION (HCC): ICD-10-CM

## 2020-09-16 DIAGNOSIS — I25.10 CORONARY ARTERY DISEASE INVOLVING NATIVE CORONARY ARTERY OF NATIVE HEART WITHOUT ANGINA PECTORIS: ICD-10-CM

## 2020-09-16 DIAGNOSIS — E66.01 SEVERE OBESITY (HCC): ICD-10-CM

## 2020-09-16 DIAGNOSIS — I48.0 PAROXYSMAL ATRIAL FIBRILLATION (HCC): Primary | ICD-10-CM

## 2020-09-16 DIAGNOSIS — I10 ESSENTIAL HYPERTENSION: ICD-10-CM

## 2020-09-16 DIAGNOSIS — R00.2 PALPITATION: ICD-10-CM

## 2020-09-16 LAB
INR BLD: 2.5
PT POC: NORMAL
VALID INTERNAL CONTROL?: YES

## 2020-09-16 PROCEDURE — 1101F PT FALLS ASSESS-DOCD LE1/YR: CPT | Performed by: SPECIALIST

## 2020-09-16 PROCEDURE — 85610 PROTHROMBIN TIME: CPT | Performed by: SPECIALIST

## 2020-09-16 PROCEDURE — G8752 SYS BP LESS 140: HCPCS | Performed by: SPECIALIST

## 2020-09-16 PROCEDURE — 99213 OFFICE O/P EST LOW 20 MIN: CPT | Performed by: SPECIALIST

## 2020-09-16 PROCEDURE — 2022F DILAT RTA XM EVC RTNOPTHY: CPT | Performed by: SPECIALIST

## 2020-09-16 PROCEDURE — 3046F HEMOGLOBIN A1C LEVEL >9.0%: CPT | Performed by: SPECIALIST

## 2020-09-16 PROCEDURE — G8417 CALC BMI ABV UP PARAM F/U: HCPCS | Performed by: SPECIALIST

## 2020-09-16 PROCEDURE — 3017F COLORECTAL CA SCREEN DOC REV: CPT | Performed by: SPECIALIST

## 2020-09-16 PROCEDURE — G8754 DIAS BP LESS 90: HCPCS | Performed by: SPECIALIST

## 2020-09-16 PROCEDURE — G8432 DEP SCR NOT DOC, RNG: HCPCS | Performed by: SPECIALIST

## 2020-09-16 PROCEDURE — G8536 NO DOC ELDER MAL SCRN: HCPCS | Performed by: SPECIALIST

## 2020-09-16 PROCEDURE — G8427 DOCREV CUR MEDS BY ELIG CLIN: HCPCS | Performed by: SPECIALIST

## 2020-09-16 RX ORDER — ATORVASTATIN CALCIUM 40 MG/1
TABLET, FILM COATED ORAL
Qty: 90 TAB | Refills: 3 | Status: SHIPPED | OUTPATIENT
Start: 2020-09-16 | End: 2021-09-24

## 2020-09-16 NOTE — PROGRESS NOTES
A full discussion of the nature of anticoagulants has been carried out. A benefit risk analysis has been presented to the patient, so that they understand the justification for choosing anticoagulation at this time. The need for frequent and regular monitoring, precise dosage adjustment and compliance is stressed. Side effects of potential bleeding are discussed. The patient should avoid any OTC items containing aspirin or ibuprofen, and should avoid great swings in general diet. Avoid alcohol consumption. Call if any signs of abnormal bleeding. Next PT/INR test in 1 month. Continue current dosing regimen. Patient verbalized understanding      Anticoagulation Summary  As of 2020    INR goal:   2.0-3.0   TTR:   85.3 % (3 y)   INR used for dosin.5 (2020)   Warfarin maintenance plan:   7.5 mg (5 mg x 1.5) every day   Weekly warfarin total:   52.5 mg   Plan last modified:   Cierra Andres RN (2018)   Next INR check:      Target end date:    Indefinite    Indications    Paroxysmal atrial fibrillation ([address] [I48.0]  Long term (current) use of anticoagulants [Z79.01]             Anticoagulation Episode Summary     INR check location:       Preferred lab:       Send INR reminders to:       Comments:         Anticoagulation Care Providers     Provider Role Specialty Phone number    Grant Medrano MD Responsible Cardiology [phone]          Future Appointments   Date Time Provider Micheal Sepulveda   2020  2:00 PM YOVANA MCGOVERN AMB   10/19/2020  9:40 AM YOVANA MCGOVERN AMB

## 2020-09-16 NOTE — PROGRESS NOTES
HISTORY OF PRESENT ILLNESS  Netta Dance is a 69 y.o. male     SUMMARY:   Problem List  Date Reviewed: 9/16/2020          Codes Class Noted    Severe obesity ([facility] ICD-10-CM: E66.01  ICD-9-CM: 278.01  3/25/2019        Long term (current) use of anticoagulants ICD-10-CM: Z79.01  ICD-9-CM: V58.61  11/12/2018        Paroxysmal atrial fibrillation ([facility] ICD-10-CM: I48.0  ICD-9-CM: 427.31  3/7/2017        Type 2 diabetes mellitus with hyperglycemia, without long-term current use of insulin ([facility] ICD-10-CM: E11.65  ICD-9-CM: 250.00, 790.29  2/23/2017        PVC (premature ventricular contraction) ICD-10-CM: I49.3  ICD-9-CM: 427.69  9/4/2014        HTN (hypertension) ICD-10-CM: I10  ICD-9-CM: 401.9  7/21/2014        Hyperlipidemia ICD-10-CM: E78.5  ICD-9-CM: 272.4  7/21/2014        CAD (coronary artery disease) ICD-10-CM: I25.10  ICD-9-CM: 414.00  7/21/2014    Overview Addendum 9/4/2014 11:55 AM by Bree Goff MD     12/12 abnormal stress test chippenham, then bms to ramus, mod distal disease, normal lvef  60-70% distal pda and lad. Current Outpatient Medications on File Prior to Visit   Medication Sig    warfarin (COUMADIN) 5 mg tablet TAKE 1 & 1/2 (ONE & ONE-HALF) TABLETS BY MOUTH ONCE DAILY. USE AS DIRECTED OR AS DIRECTED BY COUMADIN CLINIC    atorvastatin (LIPITOR) 40 mg tablet TAKE ONE TABLET BY MOUTH ONCE NIGHTLY    carvediloL (COREG) 25 mg tablet TAKE 1 TABLET BY MOUTH TWICE DAILY WITH MEALS    MAGNESIUM PO Take 400 mg by mouth.  warfarin (COUMADIN) 7.5 mg tablet Take 1 Tab by mouth daily. (Patient taking differently: Take 5 mg by mouth daily. Not taking)    omeprazole (PRILOSEC) 20 mg capsule Take 20 mg by mouth daily.  metFORMIN (GLUCOPHAGE) 500 mg tablet Take 1,000 mg by mouth two (2) times daily (with meals). No current facility-administered medications on file prior to visit.         CARDIOLOGY STUDIES TO DATE:  12/12 exercise cardiolyte apical ischemia, lvef 61%  12/12 normal echo    Chief Complaint   Patient presents with    Follow-up     HPI :  He is neither gained nor lost weight nor is he exercising like he should. His INR today was 2.5 and is not felt any A. fib. He has no idea when he last had a hemoglobin A1c or lipid check. Few months ago he had double vision for a month or 2 saw a number of specialists that sounds like they were going to do some surgery on him but it all cleared up  CARDIAC ROS:   negative    Family History   Problem Relation Age of Onset    Heart Disease Father        Past Medical History:   Diagnosis Date    Diverticulosis     Duodenal ulcer        GENERAL ROS:  A comprehensive review of systems was negative except for that written in the HPI.     Visit Vitals  /70 (BP 1 Location: Left arm, BP Patient Position: Sitting)   Pulse (!) 102   Resp 16   Ht 5' 10\" (1.778 m)   Wt 250 lb 12.8 oz (113.8 kg)   SpO2 98%   BMI 35.99 kg/m²       Wt Readings from Last 3 Encounters:   09/16/20 250 lb 12.8 oz (113.8 kg)   10/01/19 254 lb 3.2 oz (115.3 kg)   03/25/19 245 lb 9.6 oz (111.4 kg)            BP Readings from Last 3 Encounters:   09/16/20 110/70   10/01/19 116/82   03/25/19 122/86       PHYSICAL EXAM  General appearance: alert, cooperative, no distress, appears stated age  Neurologic: Alert and oriented X 3  Neck: supple, symmetrical, trachea midline, no adenopathy, no carotid bruit and no JVD  Lungs: clear to auscultation bilaterally  Heart: irregularly irregular rhythm, S1, S2 normal, no S3 or S4  Extremities: extremities normal, atraumatic, no cyanosis or edema, varicose veins noted    Lab Results   Component Value Date/Time    Cholesterol, total 161 09/21/2017 10:14 AM    Cholesterol, total 148 02/23/2017 11:14 AM    Cholesterol, total 151 08/25/2016 10:37 AM    Cholesterol, total 141 03/03/2016 11:42 AM    Cholesterol, total 164 09/03/2015 11:46 AM    HDL Cholesterol 34 (L) 09/21/2017 10:14 AM    HDL Cholesterol 31 (L) 02/23/2017 11:14 AM    HDL Cholesterol 29 (L) 08/25/2016 10:37 AM    HDL Cholesterol 28 (L) 03/03/2016 11:42 AM    HDL Cholesterol 32 (L) 09/03/2015 11:46 AM    LDL, calculated 93 09/21/2017 10:14 AM    LDL, calculated 84 02/23/2017 11:14 AM    LDL, calculated 93 08/25/2016 10:37 AM    LDL, calculated 91 03/03/2016 11:42 AM    LDL, calculated 102 (H) 09/03/2015 11:46 AM    Triglyceride 171 (H) 09/21/2017 10:14 AM    Triglyceride 166 (H) 02/23/2017 11:14 AM    Triglyceride 145 08/25/2016 10:37 AM    Triglyceride 111 03/03/2016 11:42 AM    Triglyceride 152 (H) 09/03/2015 11:46 AM     ASSESSMENT :      He is stable and asymptomatic, well compensated on a good medical regimen and needs no cardiac testing at this time. We will send him for a hemoglobin A1c and fasting lipid profile. current treatment plan is effective, no change in therapy  lab results and schedule of future lab studies reviewed with patient  reviewed diet, exercise and weight control    Encounter Diagnoses   Name Primary?  Paroxysmal atrial fibrillation (HCC) Yes    PVC (premature ventricular contraction)     Essential hypertension     Severe obesity ([address]     Coronary artery disease involving native coronary artery of native heart without angina pectoris     Mixed hyperlipidemia      No orders of the defined types were placed in this encounter. Follow-up and Dispositions    · Return in about 6 months (around 3/16/2021). Kike Stewart MD  9/16/2020  Please note that this dictation was completed with [employer], the computer voice recognition software. Quite often unanticipated grammatical, syntax, homophones, and other interpretive errors are inadvertently transcribed by the computer software. Please disregard these errors. Please excuse any errors that have escaped final proofreading. Thank you.

## 2020-09-19 LAB
ALBUMIN SERPL-MCNC: 4.4 G/DL (ref 3.7–4.7)
ALBUMIN/GLOB SERPL: 1.8 {RATIO} (ref 1.2–2.2)
ALP SERPL-CCNC: 68 IU/L (ref 39–117)
ALT SERPL-CCNC: 18 IU/L (ref 0–44)
AST SERPL-CCNC: 20 IU/L (ref 0–40)
BILIRUB SERPL-MCNC: 0.8 MG/DL (ref 0–1.2)
BUN SERPL-MCNC: 22 MG/DL (ref 8–27)
BUN/CREAT SERPL: 31 (ref 10–24)
CALCIUM SERPL-MCNC: 9.6 MG/DL (ref 8.6–10.2)
CHLORIDE SERPL-SCNC: 101 MMOL/L (ref 96–106)
CHOLEST SERPL-MCNC: 145 MG/DL (ref 100–199)
CO2 SERPL-SCNC: 22 MMOL/L (ref 20–29)
CREAT SERPL-MCNC: 0.71 MG/DL (ref 0.76–1.27)
EST. AVERAGE GLUCOSE BLD GHB EST-MCNC: 143 MG/DL
GLOBULIN SER CALC-MCNC: 2.4 G/DL (ref 1.5–4.5)
GLUCOSE SERPL-MCNC: 166 MG/DL (ref 65–99)
HBA1C MFR BLD: 6.6 % (ref 4.8–5.6)
HDLC SERPL-MCNC: 29 MG/DL
INTERPRETATION, 910389: NORMAL
LDLC SERPL CALC-MCNC: 87 MG/DL (ref 0–99)
Lab: NORMAL
POTASSIUM SERPL-SCNC: 5 MMOL/L (ref 3.5–5.2)
PROT SERPL-MCNC: 6.8 G/DL (ref 6–8.5)
SODIUM SERPL-SCNC: 137 MMOL/L (ref 134–144)
TRIGL SERPL-MCNC: 168 MG/DL (ref 0–149)
VLDLC SERPL CALC-MCNC: 29 MG/DL (ref 5–40)

## 2020-09-21 ENCOUNTER — TELEPHONE (OUTPATIENT)
Dept: CARDIOLOGY CLINIC | Age: 70
End: 2020-09-21

## 2020-09-21 DIAGNOSIS — E78.2 MIXED HYPERLIPIDEMIA: Primary | ICD-10-CM

## 2020-09-21 NOTE — TELEPHONE ENCOUNTER
----- Message from Leroy Michael MD sent at 9/20/2020  1:49 PM EDT -----  Chol is good. Sugar is high. Stay on meds and repeat labs 6mos.  fup pcp regarding sugar

## 2020-10-12 DIAGNOSIS — I48.0 PAROXYSMAL ATRIAL FIBRILLATION (HCC): ICD-10-CM

## 2020-10-12 RX ORDER — CARVEDILOL 25 MG/1
TABLET ORAL
Qty: 180 TAB | Refills: 1 | Status: SHIPPED | OUTPATIENT
Start: 2020-10-12 | End: 2021-04-21 | Stop reason: SDUPTHER

## 2020-10-12 NOTE — TELEPHONE ENCOUNTER
Requested Prescriptions     Signed Prescriptions Disp Refills    carvediloL (COREG) 25 mg tablet 180 Tab 1     Sig: TAKE 1 TABLET BY MOUTH TWICE DAILY WITH MEALS     Authorizing Provider: Alena Castellon     Ordering User: Lorena Kunz    Per Dr. Tatiana Montelongo verbal orders

## 2020-10-19 ENCOUNTER — ANTI-COAG VISIT (OUTPATIENT)
Dept: CARDIOLOGY CLINIC | Age: 70
End: 2020-10-19
Payer: MEDICARE

## 2020-10-19 DIAGNOSIS — Z79.01 LONG TERM (CURRENT) USE OF ANTICOAGULANTS: ICD-10-CM

## 2020-10-19 DIAGNOSIS — I48.0 PAROXYSMAL ATRIAL FIBRILLATION (HCC): ICD-10-CM

## 2020-10-19 LAB
INR BLD: 2.4
PT POC: NORMAL
VALID INTERNAL CONTROL?: YES

## 2020-10-19 PROCEDURE — 85610 PROTHROMBIN TIME: CPT | Performed by: SPECIALIST

## 2020-10-19 NOTE — PROGRESS NOTES
A full discussion of the nature of anticoagulants has been carried out. A benefit risk analysis has been presented to the patient, so that they understand the justification for choosing anticoagulation at this time. The need for frequent and regular monitoring, precise dosage adjustment and compliance is stressed. Side effects of potential bleeding are discussed. The patient should avoid any OTC items containing aspirin or ibuprofen, and should avoid great swings in general diet. Avoid alcohol consumption. Call if any signs of abnormal bleeding. Next PT/INR test in 1 month. Continue current dosing regimen. Patient verbalized understanding      Anticoagulation Summary  As of 10/19/2020    INR goal:   2.0-3.0   TTR:   85.7 % (3.1 y)   INR used for dosin.4 (10/19/2020)   Warfarin maintenance plan:   7.5 mg (5 mg x 1.5) every day   Weekly warfarin total:   52.5 mg   Plan last modified:   Emily Douglass RN (2018)   Next INR check:      Target end date:    Indefinite    Indications    Paroxysmal atrial fibrillation ([address] [I48.0]  Long term (current) use of anticoagulants [Z79.01]             Anticoagulation Episode Summary     INR check location:       Preferred lab:       Send INR reminders to:       Comments:         Anticoagulation Care Providers     Provider Role Specialty Phone number    Breanna Olivas MD Responsible Cardiology [phone]          Future Appointments   Date Time Provider Micheal Sepulveda   10/19/2020  9:40 AM YOVANA MCGOVERN   3/19/2021  9:40 AM MD MARIZOL Cordero AMB

## 2020-11-20 DIAGNOSIS — Z79.01 LONG TERM (CURRENT) USE OF ANTICOAGULANTS: ICD-10-CM

## 2020-11-20 DIAGNOSIS — I48.0 PAROXYSMAL ATRIAL FIBRILLATION (HCC): ICD-10-CM

## 2020-11-20 DIAGNOSIS — I25.10 CORONARY ARTERY DISEASE INVOLVING NATIVE CORONARY ARTERY OF NATIVE HEART WITHOUT ANGINA PECTORIS: ICD-10-CM

## 2020-11-20 RX ORDER — WARFARIN 7.5 MG/1
7.5 TABLET ORAL DAILY
Qty: 90 TAB | Refills: 0 | Status: CANCELLED | OUTPATIENT
Start: 2020-11-20

## 2020-11-20 RX ORDER — WARFARIN SODIUM 5 MG/1
TABLET ORAL
Qty: 135 TAB | Refills: 0 | Status: SHIPPED | OUTPATIENT
Start: 2020-11-20 | End: 2021-02-16

## 2020-11-20 NOTE — TELEPHONE ENCOUNTER
Requested Prescriptions     Signed Prescriptions Disp Refills    warfarin (COUMADIN) 5 mg tablet 135 Tab 0     Sig: TAKE 1 & 1/2 (ONE & ONE-HALF) TABLETS BY MOUTH ONCE DAILY.  USE AS DIRECTED OR AS DIRECTED BY COUMADIN CLINIC     Authorizing Provider: Lauren Marinelli     Ordering User: Camden Thornton       Last office visit 9/16/2020    Per Dr. Grayson Hallman   Date Time Provider Micheal Sepulveda   11/23/2020  9:40 AM COUMYOVANA CLARK AMB   3/19/2021  9:40 AM MD MARIZOL Yi AMB

## 2020-11-23 ENCOUNTER — ANTI-COAG VISIT (OUTPATIENT)
Dept: CARDIOLOGY CLINIC | Age: 70
End: 2020-11-23
Payer: MEDICARE

## 2020-11-23 DIAGNOSIS — I48.0 PAROXYSMAL ATRIAL FIBRILLATION (HCC): ICD-10-CM

## 2020-11-23 DIAGNOSIS — Z79.01 LONG TERM (CURRENT) USE OF ANTICOAGULANTS: ICD-10-CM

## 2020-11-23 LAB
INR BLD: 1.8
PT POC: NORMAL
VALID INTERNAL CONTROL?: YES

## 2020-11-23 PROCEDURE — 85610 PROTHROMBIN TIME: CPT | Performed by: SPECIALIST

## 2020-11-23 NOTE — PROGRESS NOTES
A full discussion of the nature of anticoagulants has been carried out. A benefit risk analysis has been presented to the patient, so that they understand the justification for choosing anticoagulation at this time. The need for frequent and regular monitoring, precise dosage adjustment and compliance is stressed. Side effects of potential bleeding are discussed. The patient should avoid any OTC items containing aspirin or ibuprofen, and should avoid great swings in general diet. Avoid alcohol consumption. Call if any signs of abnormal bleeding. Next PT/INR test in 2 weeks. Continue current dosing regimen. Patient verbalized understanding  Previously therapeutic. Anticoagulation Summary  As of 2020    INR goal:   2.0-3.0   TTR:   85.1 % (3.1 y)   INR used for dosin.8! (2020)   Warfarin maintenance plan:   7.5 mg (5 mg x 1.5) every day   Weekly warfarin total:   52.5 mg   Plan last modified:   Katelin Cha RN (2018)   Next INR check:   2020   Target end date:    Indefinite    Indications    Paroxysmal atrial fibrillation ([facility] [I48.0]  Long term (current) use of anticoagulants [Z79.01]             Anticoagulation Episode Summary     INR check location:       Preferred lab:       Send INR reminders to:       Comments:         Anticoagulation Care Providers     Provider Role Specialty Phone number    Yaz Ariza MD [facility] Cardiology [phone]          Future Appointments   Date Time Provider Micheal Sepulveda   2020  9:40 AM YOVANA MCGOVERN AMB   2020  9:40 AM YOVANA MCGOVERN AMB   3/19/2021  9:40 AM MD MARIZOL Crawford AMB

## 2020-12-07 ENCOUNTER — ANTI-COAG VISIT (OUTPATIENT)
Dept: CARDIOLOGY CLINIC | Age: 70
End: 2020-12-07
Payer: MEDICARE

## 2020-12-07 DIAGNOSIS — Z79.01 LONG TERM (CURRENT) USE OF ANTICOAGULANTS: ICD-10-CM

## 2020-12-07 DIAGNOSIS — I48.0 PAROXYSMAL ATRIAL FIBRILLATION (HCC): ICD-10-CM

## 2020-12-07 LAB
INR BLD: 2.1
PT POC: 25.1 SECONDS
VALID INTERNAL CONTROL?: YES

## 2020-12-07 PROCEDURE — 85610 PROTHROMBIN TIME: CPT | Performed by: SPECIALIST

## 2020-12-07 NOTE — PATIENT INSTRUCTIONS

## 2021-01-04 ENCOUNTER — ANTI-COAG VISIT (OUTPATIENT)
Dept: CARDIOLOGY CLINIC | Age: 71
End: 2021-01-04
Payer: MEDICARE

## 2021-01-04 DIAGNOSIS — I48.0 PAROXYSMAL ATRIAL FIBRILLATION (HCC): ICD-10-CM

## 2021-01-04 DIAGNOSIS — Z79.01 LONG TERM (CURRENT) USE OF ANTICOAGULANTS: ICD-10-CM

## 2021-01-04 LAB
INR BLD: 2.2
PT POC: 26.6 SECONDS
VALID INTERNAL CONTROL?: YES

## 2021-01-04 PROCEDURE — 85610 PROTHROMBIN TIME: CPT | Performed by: SPECIALIST

## 2021-01-04 NOTE — PATIENT INSTRUCTIONS
A full discussion of the nature of anticoagulants has been carried out. A benefit risk analysis has been presented to the patient, so that they understand the justification for choosing anticoagulation at this time. The need for frequent and regular monitoring, precise dosage adjustment and compliance is stressed. Side effects of potential bleeding are discussed. The patient should avoid any OTC items containing aspirin or ibuprofen, and should avoid great swings in general diet. Avoid alcohol consumption. Call if any signs of abnormal bleeding. Next PT/INR test on 3-19-21 with Dr. Olvera Dk appointment. Patient states that he will be living at home at [facility] for the next 2 months. Will have INR checked locally. Encouraged him to seek out [facility] and order could be faxed to them. Verbalized understanding. No change in dosing.

## 2021-02-13 DIAGNOSIS — Z79.01 LONG TERM (CURRENT) USE OF ANTICOAGULANTS: ICD-10-CM

## 2021-02-13 DIAGNOSIS — I48.0 PAROXYSMAL ATRIAL FIBRILLATION (HCC): ICD-10-CM

## 2021-02-16 RX ORDER — WARFARIN SODIUM 5 MG/1
TABLET ORAL
Qty: 135 TAB | Refills: 0 | Status: SHIPPED | OUTPATIENT
Start: 2021-02-16 | End: 2021-05-18

## 2021-02-16 NOTE — TELEPHONE ENCOUNTER
Requested Prescriptions     Signed Prescriptions Disp Refills    warfarin (COUMADIN) 5 mg tablet 135 Tab 0     Sig: TAKE 1 & 1/2 (ONE & ONE-HALF) TABLETS BY MOUTH ONCE DAILY USE  AS  DIRECTED  OR  AS  DIRECTED  BY  COUMADIN  CLINIC     Authorizing Provider: Jeannie Baker     Ordering User: Christen Potts       Last office visit 9/16/2020    Per Dr. Arti Carr   Date Time Provider Micheal Sepulveda   3/19/2021  9:20 AM YOVANA MCGOVERN AMB   3/19/2021  9:40 AM MD MARZIOL Brush BS AMB

## 2021-04-02 ENCOUNTER — ANTI-COAG VISIT (OUTPATIENT)
Dept: CARDIOLOGY CLINIC | Age: 71
End: 2021-04-02
Payer: MEDICARE

## 2021-04-02 ENCOUNTER — OFFICE VISIT (OUTPATIENT)
Dept: CARDIOLOGY CLINIC | Age: 71
End: 2021-04-02
Payer: MEDICARE

## 2021-04-02 VITALS
HEART RATE: 102 BPM | RESPIRATION RATE: 18 BRPM | HEIGHT: 70 IN | BODY MASS INDEX: 36.08 KG/M2 | DIASTOLIC BLOOD PRESSURE: 86 MMHG | WEIGHT: 252 LBS | OXYGEN SATURATION: 98 % | SYSTOLIC BLOOD PRESSURE: 138 MMHG

## 2021-04-02 DIAGNOSIS — I25.10 CORONARY ARTERY DISEASE INVOLVING NATIVE CORONARY ARTERY OF NATIVE HEART WITHOUT ANGINA PECTORIS: Primary | ICD-10-CM

## 2021-04-02 DIAGNOSIS — I48.0 PAROXYSMAL ATRIAL FIBRILLATION (HCC): ICD-10-CM

## 2021-04-02 DIAGNOSIS — Z79.01 LONG TERM (CURRENT) USE OF ANTICOAGULANTS: ICD-10-CM

## 2021-04-02 DIAGNOSIS — I10 ESSENTIAL HYPERTENSION: ICD-10-CM

## 2021-04-02 DIAGNOSIS — E78.2 MIXED HYPERLIPIDEMIA: ICD-10-CM

## 2021-04-02 LAB
INR BLD: 2.1
PT POC: NORMAL
VALID INTERNAL CONTROL?: YES

## 2021-04-02 PROCEDURE — G0463 HOSPITAL OUTPT CLINIC VISIT: HCPCS | Performed by: SPECIALIST

## 2021-04-02 PROCEDURE — 3017F COLORECTAL CA SCREEN DOC REV: CPT | Performed by: SPECIALIST

## 2021-04-02 PROCEDURE — G8536 NO DOC ELDER MAL SCRN: HCPCS | Performed by: SPECIALIST

## 2021-04-02 PROCEDURE — G8752 SYS BP LESS 140: HCPCS | Performed by: SPECIALIST

## 2021-04-02 PROCEDURE — G8417 CALC BMI ABV UP PARAM F/U: HCPCS | Performed by: SPECIALIST

## 2021-04-02 PROCEDURE — 99213 OFFICE O/P EST LOW 20 MIN: CPT | Performed by: SPECIALIST

## 2021-04-02 PROCEDURE — G8754 DIAS BP LESS 90: HCPCS | Performed by: SPECIALIST

## 2021-04-02 PROCEDURE — G8510 SCR DEP NEG, NO PLAN REQD: HCPCS | Performed by: SPECIALIST

## 2021-04-02 PROCEDURE — 85610 PROTHROMBIN TIME: CPT | Performed by: SPECIALIST

## 2021-04-02 PROCEDURE — G8427 DOCREV CUR MEDS BY ELIG CLIN: HCPCS | Performed by: SPECIALIST

## 2021-04-02 PROCEDURE — 1101F PT FALLS ASSESS-DOCD LE1/YR: CPT | Performed by: SPECIALIST

## 2021-04-02 NOTE — PROGRESS NOTES
A full discussion of the nature of anticoagulants has been carried out. A benefit risk analysis has been presented to the patient, so that they understand the justification for choosing anticoagulation at this time. The need for frequent and regular monitoring, precise dosage adjustment and compliance is stressed. Side effects of potential bleeding are discussed. The patient should avoid any OTC items containing aspirin or ibuprofen, and should avoid great swings in general diet. Avoid alcohol consumption. Call if any signs of abnormal bleeding. Next PT/INR test in 1 month. Pt dose was unchanged. Pt verbalized understanding and denies questions at this time. Anticoagulation Summary  As of 2021    INR goal:  2.0-3.0   TTR:  85.9 % (3.5 y)   INR used for dosin.1 (2021)   Warfarin maintenance plan:  7.5 mg (5 mg x 1.5) every day   Weekly warfarin total:  52.5 mg   No change documented:  Keenan Calle RN   Plan last modified:  Charlotte Chung RN (2021)   Next INR check:  2021   Target end date:   Indefinite    Indications    Paroxysmal atrial fibrillation ([facility] [I48.0]  Long term (current) use of anticoagulants [Z79.01]             Anticoagulation Episode Summary     INR check location:      Preferred lab:      Send INR reminders to:      Comments:        Anticoagulation Care Providers     Provider Role Specialty Phone number    Consuelo Pino MD [facility] Cardiology [phone]          Future Appointments   Date Time Provider Micheal Sepulveda   2021  2:20 PM MD MARIZOL Earl   2021  9:20 AM YOVANA MCGOVERN AMB

## 2021-04-02 NOTE — PROGRESS NOTES
HISTORY OF PRESENT ILLNESS  Paris Perea is a 66 y.o. male     SUMMARY:   Problem List  Date Reviewed: 4/2/2021          Codes Class Noted    Severe obesity ([facility] ICD-10-CM: E66.01  ICD-9-CM: 278.01  3/25/2019        Long term (current) use of anticoagulants ICD-10-CM: Z79.01  ICD-9-CM: V58.61  11/12/2018        Paroxysmal atrial fibrillation ([facility] ICD-10-CM: I48.0  ICD-9-CM: 427.31  3/7/2017        Type 2 diabetes mellitus with hyperglycemia, without long-term current use of insulin ([facility] ICD-10-CM: E11.65  ICD-9-CM: 250.00, 790.29  2/23/2017        PVC (premature ventricular contraction) ICD-10-CM: I49.3  ICD-9-CM: 427.69  9/4/2014        HTN (hypertension) ICD-10-CM: I10  ICD-9-CM: 401.9  7/21/2014        Hyperlipidemia ICD-10-CM: E78.5  ICD-9-CM: 272.4  7/21/2014        CAD (coronary artery disease) ICD-10-CM: I25.10  ICD-9-CM: 414.00  7/21/2014    Overview Addendum 9/4/2014 11:55 AM by Otilia Nassar MD     12/12 abnormal stress test chippenham, then bms to ramus, mod distal disease, normal lvef  60-70% distal pda and lad. Current Outpatient Medications on File Prior to Visit   Medication Sig    warfarin (COUMADIN) 5 mg tablet TAKE 1 & 1/2 (ONE & ONE-HALF) TABLETS BY MOUTH ONCE DAILY USE  AS  DIRECTED  OR  AS  DIRECTED  BY  COUMADIN  CLINIC    carvediloL (COREG) 25 mg tablet TAKE 1 TABLET BY MOUTH TWICE DAILY WITH MEALS    atorvastatin (LIPITOR) 40 mg tablet TAKE ONE TABLET BY MOUTH ONCE NIGHTLY    MAGNESIUM PO Take 400 mg by mouth.  omeprazole (PRILOSEC) 20 mg capsule Take 20 mg by mouth daily.  metFORMIN (GLUCOPHAGE) 500 mg tablet Take 1,000 mg by mouth two (2) times daily (with meals). No current facility-administered medications on file prior to visit. CARDIOLOGY STUDIES TO DATE:  12/12 exercise cardiolyte apical ischemia, lvef 61%  12/12 normal echo    Chief Complaint   Patient presents with    Follow-up     HPI :  He is doing well.   He spent almost 2 months in his place in [facility] but unfortunately the weather was not too great until near the end. Blood pressures well controlled he has no cardiac complaints and he is keeping up with his Coumadin checks. He is not exercising and he is not lost any weight  CARDIAC ROS:   negative for chest pain, dyspnea, palpitations, syncope, orthopnea, paroxysmal nocturnal dyspnea, exertional chest pressure/discomfort, claudication, lower extremity edema    Family History   Problem Relation Age of Onset    Heart Disease Father        Past Medical History:   Diagnosis Date    Diverticulosis     Duodenal ulcer        GENERAL ROS:  A comprehensive review of systems was negative except for that written in the HPI.     Visit Vitals  /86 (BP 1 Location: Right arm, BP Patient Position: Sitting, BP Cuff Size: Adult)   Pulse (!) 102   Resp 18   Ht 5' 10\" (1.778 m)   Wt 252 lb (114.3 kg)   SpO2 98%   BMI 36.16 kg/m²       Wt Readings from Last 3 Encounters:   04/02/21 252 lb (114.3 kg)   09/16/20 250 lb 12.8 oz (113.8 kg)   10/01/19 254 lb 3.2 oz (115.3 kg)            BP Readings from Last 3 Encounters:   04/02/21 138/86   09/16/20 110/70   10/01/19 116/82       PHYSICAL EXAM  General appearance: alert, cooperative, no distress, appears stated age  Neurologic: Alert and oriented X 3  Neck: supple, symmetrical, trachea midline, no adenopathy, no carotid bruit and no JVD  Lungs: clear to auscultation bilaterally  Heart: irregular rate and rhythm, S1, S2 normal, no murmur, click, rub or gallop  Extremities: extremities normal, atraumatic, no cyanosis or edema    Lab Results   Component Value Date/Time    Cholesterol, total 145 09/18/2020 10:55 AM    Cholesterol, total 161 09/21/2017 10:14 AM    Cholesterol, total 148 02/23/2017 11:14 AM    Cholesterol, total 151 08/25/2016 10:37 AM    Cholesterol, total 141 03/03/2016 11:42 AM    HDL Cholesterol 29 (L) 09/18/2020 10:55 AM    HDL Cholesterol 34 (L) 09/21/2017 10:14 AM    HDL Cholesterol 31 (L) 02/23/2017 11:14 AM    HDL Cholesterol 29 (L) 08/25/2016 10:37 AM    HDL Cholesterol 28 (L) 03/03/2016 11:42 AM    LDL, calculated 87 09/18/2020 10:55 AM    LDL, calculated 93 09/21/2017 10:14 AM    LDL, calculated 84 02/23/2017 11:14 AM    LDL, calculated 93 08/25/2016 10:37 AM    LDL, calculated 91 03/03/2016 11:42 AM    LDL, calculated 102 (H) 09/03/2015 11:46 AM    Triglyceride 168 (H) 09/18/2020 10:55 AM    Triglyceride 171 (H) 09/21/2017 10:14 AM    Triglyceride 166 (H) 02/23/2017 11:14 AM    Triglyceride 145 08/25/2016 10:37 AM    Triglyceride 111 03/03/2016 11:42 AM     ASSESSMENT :      He is stable and asymptomatic, well compensated on a good medical regimen and needs no cardiac testing at this time. current treatment plan is effective, no change in therapy  lab results and schedule of future lab studies reviewed with patient  reviewed diet, exercise and weight control    Encounter Diagnoses   Name Primary?  Coronary artery disease involving native coronary artery of native heart without angina pectoris Yes    Essential hypertension     Mixed hyperlipidemia     Long term (current) use of anticoagulants     Paroxysmal atrial fibrillation (HCC)      No orders of the defined types were placed in this encounter. Follow-up and Dispositions    · Return in about 6 months (around 10/2/2021). Angelita Stanley MD  4/2/2021  Please note that this dictation was completed with [employer], the computer voice recognition software. Quite often unanticipated grammatical, syntax, homophones, and other interpretive errors are inadvertently transcribed by the computer software. Please disregard these errors. Please excuse any errors that have escaped final proofreading. Thank you.

## 2021-04-21 DIAGNOSIS — I48.0 PAROXYSMAL ATRIAL FIBRILLATION (HCC): ICD-10-CM

## 2021-04-21 RX ORDER — CARVEDILOL 25 MG/1
25 TABLET ORAL 2 TIMES DAILY
Qty: 180 TAB | Refills: 1 | Status: SHIPPED | OUTPATIENT
Start: 2021-04-21 | End: 2021-10-20

## 2021-04-21 NOTE — TELEPHONE ENCOUNTER
Requested Prescriptions     Signed Prescriptions Disp Refills    carvediloL (COREG) 25 mg tablet 180 Tab 1     Sig: Take 1 Tab by mouth two (2) times a day.      Authorizing Provider: Som Otero     Ordering User: Sophie Meyer    Per Dr. Lesley Garciaome verbal orders

## 2021-04-30 ENCOUNTER — ANTI-COAG VISIT (OUTPATIENT)
Dept: CARDIOLOGY CLINIC | Age: 71
End: 2021-04-30
Payer: MEDICARE

## 2021-04-30 DIAGNOSIS — I48.0 PAROXYSMAL ATRIAL FIBRILLATION (HCC): ICD-10-CM

## 2021-04-30 DIAGNOSIS — Z79.01 LONG TERM (CURRENT) USE OF ANTICOAGULANTS: ICD-10-CM

## 2021-04-30 LAB
INR BLD: 1.9
PT POC: NORMAL
VALID INTERNAL CONTROL?: YES

## 2021-04-30 PROCEDURE — 85610 PROTHROMBIN TIME: CPT | Performed by: SPECIALIST

## 2021-04-30 NOTE — PROGRESS NOTES
A full discussion of the nature of anticoagulants has been carried out. A benefit risk analysis has been presented to the patient, so that they understand the justification for choosing anticoagulation at this time. The need for frequent and regular monitoring, precise dosage adjustment and compliance is stressed. Side effects of potential bleeding are discussed. The patient should avoid any OTC items containing aspirin or ibuprofen, and should avoid great swings in general diet. Avoid alcohol consumption. Call if any signs of abnormal bleeding. Next PT/INR test in 1 month. Pt dose was unchanged. Pt reports eating more greens lately. Anticoagulation Summary  As of 2021    INR goal:  2.0-3.0   TTR:  85.1 % (3.6 y)   INR used for dosin.9 (2021)   Warfarin maintenance plan:  7.5 mg (5 mg x 1.5) every day   Weekly warfarin total:  52.5 mg   No change documented:  Jackson Whiting RN   Plan last modified:  Juan Gold RN (2021)   Next INR check:  2021   Target end date:   Indefinite    Indications    Paroxysmal atrial fibrillation ([facility] [I48.0]  Long term (current) use of anticoagulants [Z79.01]             Anticoagulation Episode Summary     INR check location:      Preferred lab:      Send INR reminders to:      Comments:        Anticoagulation Care Providers     Provider Role Specialty Phone number    Thom Diehl MD [facility] Cardiology [phone]          Future Appointments   Date Time Provider Micheal Sepulveda   2021  9:20 AM YOVANA MCGOVERN AMB   2021  9:00 AM YOVANA MCGOVERN AMB   10/15/2021 10:00 AM MD MARIZOL Trammell AMB

## 2021-05-18 DIAGNOSIS — I48.0 PAROXYSMAL ATRIAL FIBRILLATION (HCC): ICD-10-CM

## 2021-05-18 DIAGNOSIS — Z79.01 LONG TERM (CURRENT) USE OF ANTICOAGULANTS: ICD-10-CM

## 2021-05-18 RX ORDER — WARFARIN SODIUM 5 MG/1
TABLET ORAL
Qty: 135 TAB | Refills: 0 | Status: SHIPPED | OUTPATIENT
Start: 2021-05-18 | End: 2021-08-16

## 2021-05-18 NOTE — TELEPHONE ENCOUNTER
Requested Prescriptions     Signed Prescriptions Disp Refills    warfarin (COUMADIN) 5 mg tablet 135 Tab 0     Sig: TAKE 1 & 1/2 (ONE & ONE-HALF) TABLETS BY MOUTH ONCE DAILY OR AS  DIRECTED  BY  COUMADIN  CLINIC     Authorizing Provider: Uday Pollard     Ordering User: Priyank Buck       Last office visit 4/2/21    Per Dr. Lotus Jo   Date Time Provider Micheal Sepulveda   5/27/2021  9:00 AM YOVANA MCGOVERN   10/15/2021 10:00 AM MD MARIZOL Lim AMB

## 2021-05-27 ENCOUNTER — ANTI-COAG VISIT (OUTPATIENT)
Dept: CARDIOLOGY CLINIC | Age: 71
End: 2021-05-27
Payer: MEDICARE

## 2021-05-27 DIAGNOSIS — Z79.01 LONG TERM (CURRENT) USE OF ANTICOAGULANTS: ICD-10-CM

## 2021-05-27 DIAGNOSIS — I48.0 PAROXYSMAL ATRIAL FIBRILLATION (HCC): Primary | ICD-10-CM

## 2021-05-27 LAB
INR BLD: 2.3
PT POC: NORMAL
VALID INTERNAL CONTROL?: YES

## 2021-05-27 PROCEDURE — 85610 PROTHROMBIN TIME: CPT | Performed by: SPECIALIST

## 2021-05-27 NOTE — PROGRESS NOTES
A full discussion of the nature of anticoagulants has been carried out. A benefit risk analysis has been presented to the patient, so that they understand the justification for choosing anticoagulation at this time. The need for frequent and regular monitoring, precise dosage adjustment and compliance is stressed. Side effects of potential bleeding are discussed. The patient should avoid any OTC items containing aspirin or ibuprofen, and should avoid great swings in general diet. Avoid alcohol consumption. Call if any signs of abnormal bleeding. Next PT/INR test in 1 month. Pt dose was unchanged. Patient reports that he ran out of his multivitamin that had vitamin K in it. He then bought 30mg and 60mg Vitamin K supplements and used both to try and balance out his coumadin level. Advised that when he eats more greens/Vitamin K in his diet, to take less supplemental Vitamin K.Pt verbalized understanding and denies questions at this time. Anticoagulation Summary  As of 2021    INR goal:  2.0-3.0   TTR:  84.9 % (3.7 y)   INR used for dosin.3 (2021)   Warfarin maintenance plan:  7.5 mg (5 mg x 1.5) every day   Weekly warfarin total:  52.5 mg   No change documented:  Irvin Gaspar RN   Plan last modified:  Ricci Guzman RN (2021)   Next INR check:  2021   Target end date:   Indefinite    Indications    Paroxysmal atrial fibrillation ([facility] [address] [I48.0]  Long term (current) use of anticoagulants [Z79.01]             Anticoagulation Episode Summary     INR check location:      Preferred lab:      Send INR reminders to:      Comments:        Anticoagulation Care Providers     Provider Role Specialty Phone number    Zeynep Michaels MD [facility] Cardiology [phone]          Future Appointments   Date Time Provider Micheal Sepulveda   2021  9:00 AM COUMADINYOVANA BS AMB   2021  9:00 AM COUMADINYOVANA AMB   10/15/2021 10:00 AM Devon Daugherty III, MD FONTANA BS AMB

## 2021-06-24 ENCOUNTER — ANTI-COAG VISIT (OUTPATIENT)
Dept: CARDIOLOGY CLINIC | Age: 71
End: 2021-06-24
Payer: MEDICARE

## 2021-06-24 DIAGNOSIS — Z79.01 LONG TERM (CURRENT) USE OF ANTICOAGULANTS: ICD-10-CM

## 2021-06-24 DIAGNOSIS — I48.0 PAROXYSMAL ATRIAL FIBRILLATION (HCC): Primary | ICD-10-CM

## 2021-06-24 LAB
INR BLD: 2.1
PT POC: NORMAL
VALID INTERNAL CONTROL?: YES

## 2021-06-24 PROCEDURE — 85610 PROTHROMBIN TIME: CPT | Performed by: SPECIALIST

## 2021-07-30 ENCOUNTER — ANTI-COAG VISIT (OUTPATIENT)
Dept: CARDIOLOGY CLINIC | Age: 71
End: 2021-07-30
Payer: MEDICARE

## 2021-07-30 DIAGNOSIS — Z79.01 LONG TERM (CURRENT) USE OF ANTICOAGULANTS: ICD-10-CM

## 2021-07-30 DIAGNOSIS — I48.0 PAROXYSMAL ATRIAL FIBRILLATION (HCC): Primary | ICD-10-CM

## 2021-07-30 LAB
INR BLD: 1.9
PT POC: NORMAL
VALID INTERNAL CONTROL?: YES

## 2021-07-30 PROCEDURE — 85610 PROTHROMBIN TIME: CPT | Performed by: SPECIALIST

## 2021-07-30 NOTE — PATIENT INSTRUCTIONS
A full discussion of the nature of anticoagulants has been carried out. A benefit risk analysis has been presented to the patient, so that they understand the justification for choosing anticoagulation at this time. The need for frequent and regular monitoring, precise dosage adjustment and compliance is stressed. Side effects of potential bleeding are discussed. The patient should avoid any OTC items containing aspirin or ibuprofen, and should avoid great swings in general diet. Avoid alcohol consumption. Call if any signs of abnormal bleeding. Next PT/INR test in 1 month. Reports no change in diet. Taking multivitamin with different amounts of vitamin K. Discussed keeping consistent.

## 2021-08-14 DIAGNOSIS — Z79.01 LONG TERM (CURRENT) USE OF ANTICOAGULANTS: ICD-10-CM

## 2021-08-14 DIAGNOSIS — I48.0 PAROXYSMAL ATRIAL FIBRILLATION (HCC): ICD-10-CM

## 2021-08-16 RX ORDER — WARFARIN SODIUM 5 MG/1
TABLET ORAL
Qty: 135 TABLET | Refills: 0 | Status: SHIPPED | OUTPATIENT
Start: 2021-08-16 | End: 2021-11-16

## 2021-08-16 NOTE — TELEPHONE ENCOUNTER
Requested Prescriptions     Signed Prescriptions Disp Refills    warfarin (COUMADIN) 5 mg tablet 135 Tablet 0     Sig: TAKE 1 & 1/2 (ONE & ONE-HALF) TABLETS BY MOUTH ONCE DAILY OR AS ADVISED BY COUMADIN CLINIC     Authorizing Provider: Chin Ott     Ordering User: Darrick Cintron    Per Dr. Jyoti Turner verbal orders

## 2021-08-27 ENCOUNTER — ANTI-COAG VISIT (OUTPATIENT)
Dept: CARDIOLOGY CLINIC | Age: 71
End: 2021-08-27
Payer: MEDICARE

## 2021-08-27 DIAGNOSIS — I48.0 PAROXYSMAL ATRIAL FIBRILLATION (HCC): Primary | ICD-10-CM

## 2021-08-27 DIAGNOSIS — Z79.01 LONG TERM (CURRENT) USE OF ANTICOAGULANTS: ICD-10-CM

## 2021-08-27 LAB
INR BLD: 2.7
PT POC: NORMAL
VALID INTERNAL CONTROL?: YES

## 2021-08-27 PROCEDURE — 85610 PROTHROMBIN TIME: CPT | Performed by: SPECIALIST

## 2021-08-27 NOTE — PROGRESS NOTES
A full discussion of the nature of anticoagulants has been carried out. A benefit risk analysis has been presented to the patient, so that they understand the justification for choosing anticoagulation at this time. The need for frequent and regular monitoring, precise dosage adjustment and compliance is stressed. Side effects of potential bleeding are discussed. The patient should avoid any OTC items containing aspirin or ibuprofen, and should avoid great swings in general diet. Avoid alcohol consumption. Call if any signs of abnormal bleeding. Next PT/INR test in 1 month. Pt dose was not changed. Anticoagulation Summary  As of 2021    INR goal:  2.0-3.0   TTR:  84.4 % (3.9 y)   INR used for dosin.7 (2021)   Warfarin maintenance plan:  7.5 mg (5 mg x 1.5) every day   Weekly warfarin total:  52.5 mg   Plan last modified:  Alyse Mathews RN (2021)   Next INR check:  2021   Target end date:   Indefinite    Indications    Paroxysmal atrial fibrillation ([facility] [address] [I48.0]  Long term (current) use of anticoagulants [Z79.01]             Anticoagulation Episode Summary     INR check location:      Preferred lab:      Send INR reminders to:      Comments:        Anticoagulation Care Providers     Provider Role Specialty Phone number    Reyes Dennison MD [facility] Cardiology [phone]          Future Appointments   Date Time Provider Micheal Sepulveda   2021  9:40 AM YOVANA MCGOVERN AMB   2021  9:40 AM YOVANA MCGOVERN AMB   10/15/2021 10:00 AM MD MARIZOL Katz AMB

## 2021-09-23 DIAGNOSIS — R00.2 PALPITATION: ICD-10-CM

## 2021-09-23 DIAGNOSIS — E11.65 TYPE 2 DIABETES MELLITUS WITH HYPERGLYCEMIA, WITHOUT LONG-TERM CURRENT USE OF INSULIN (HCC): Primary | ICD-10-CM

## 2021-09-24 ENCOUNTER — ANTI-COAG VISIT (OUTPATIENT)
Dept: CARDIOLOGY CLINIC | Age: 71
End: 2021-09-24
Payer: MEDICARE

## 2021-09-24 ENCOUNTER — TELEPHONE (OUTPATIENT)
Dept: CARDIOLOGY CLINIC | Age: 71
End: 2021-09-24

## 2021-09-24 DIAGNOSIS — I48.0 PAROXYSMAL ATRIAL FIBRILLATION (HCC): Primary | ICD-10-CM

## 2021-09-24 DIAGNOSIS — Z79.01 LONG TERM (CURRENT) USE OF ANTICOAGULANTS: ICD-10-CM

## 2021-09-24 LAB
INR BLD: 2.3
PT POC: NORMAL
VALID INTERNAL CONTROL?: YES

## 2021-09-24 PROCEDURE — 85610 PROTHROMBIN TIME: CPT | Performed by: SPECIALIST

## 2021-09-24 RX ORDER — ATORVASTATIN CALCIUM 40 MG/1
TABLET, FILM COATED ORAL
Qty: 90 TABLET | Refills: 3 | Status: SHIPPED | OUTPATIENT
Start: 2021-09-24 | End: 2022-09-19

## 2021-09-24 RX ORDER — METFORMIN HYDROCHLORIDE 500 MG/1
1000 TABLET ORAL 2 TIMES DAILY WITH MEALS
Qty: 120 TABLET | Refills: 0 | Status: SHIPPED | OUTPATIENT
Start: 2021-09-24 | End: 2022-05-04 | Stop reason: SDUPTHER

## 2021-09-24 NOTE — TELEPHONE ENCOUNTER
Requested Prescriptions     Signed Prescriptions Disp Refills    atorvastatin (LIPITOR) 40 mg tablet 90 Tablet 3     Sig: TAKE ONE TABLET BY MOUTH ONCE NIGHTLY     Authorizing Provider: Raul Flanagan     Ordering User: Sherita Nunez metFORMIN (GLUCOPHAGE) 500 mg tablet 120 Tablet 0     Sig: Take 2 Tablets by mouth two (2) times daily (with meals).  Please request future refills from pcp     Authorizing Provider: Raul Flanagan     Ordering User: Chris Victoria    Per Dr. Marisabel Marquez verbal order    See other encounter

## 2021-09-24 NOTE — PROGRESS NOTES
A full discussion of the nature of anticoagulants has been carried out. A benefit risk analysis has been presented to the patient, so that they understand the justification for choosing anticoagulation at this time. The need for frequent and regular monitoring, precise dosage adjustment and compliance is stressed. Side effects of potential bleeding are discussed. The patient should avoid any OTC items containing aspirin or ibuprofen, and should avoid great swings in general diet. Avoid alcohol consumption. Call if any signs of abnormal bleeding. Next PT/INR test in 1 month. Pt dose was not changed     Anticoagulation Summary  As of 2021    INR goal:  2.0-3.0   TTR:  84.7 % (4 y)   INR used for dosin.3 (2021)   Warfarin maintenance plan:  7.5 mg (5 mg x 1.5) every day   Weekly warfarin total:  52.5 mg   Plan last modified:  Sheila Rae RN (2021)   Next INR check:     Target end date:   Indefinite    Indications    Paroxysmal atrial fibrillation ([address] [I48.0]  Long term (current) use of anticoagulants [Z79.01]             Anticoagulation Episode Summary     INR check location:      Preferred lab:      Send INR reminders to:      Comments:        Anticoagulation Care Providers     Provider Role Specialty Phone number    Raymond Grant MD [facility] Cardiology [phone]          Future Appointments   Date Time Provider Micheal Sepulveda   10/15/2021 10:00 AM MD MARIZOL Blevins AMB

## 2021-09-24 NOTE — TELEPHONE ENCOUNTER
Requested Prescriptions     Pending Prescriptions Disp Refills    metFORMIN (GLUCOPHAGE) 500 mg tablet 180 Tablet 1     Sig: Take 2 Tablets by mouth two (2) times daily (with meals).      Signed Prescriptions Disp Refills    atorvastatin (LIPITOR) 40 mg tablet 90 Tablet 3     Sig: TAKE ONE TABLET BY MOUTH ONCE NIGHTLY     Authorizing Provider: Wayne Davis     Ordering User: Jane Trevizo    Per Dr. Marquita Godinez verbal orders     Message sent to Dr. Cindy Okeefe regarding metformin

## 2021-09-24 NOTE — TELEPHONE ENCOUNTER
Patient does not have a PCP at this time and is requesting a refills for these medications.      Future Appointments   Date Time Provider Micheal Sepulveda   10/15/2021 10:00 AM MD MARIZOL Foreman   10/29/2021  9:40 AM YOVANA MCGOVERN AMB

## 2021-09-24 NOTE — TELEPHONE ENCOUNTER
Keiko Perkins sent message (pt in office for INR check) stating pt currently does not have a PCP and requested us to send refill for metformin. Okay to send 30 day supply?

## 2021-10-20 DIAGNOSIS — I48.0 PAROXYSMAL ATRIAL FIBRILLATION (HCC): ICD-10-CM

## 2021-10-20 RX ORDER — CARVEDILOL 25 MG/1
TABLET ORAL
Qty: 180 TABLET | Refills: 3 | Status: SHIPPED | OUTPATIENT
Start: 2021-10-20 | End: 2022-10-24

## 2021-10-20 NOTE — TELEPHONE ENCOUNTER
Requested Prescriptions     Signed Prescriptions Disp Refills    carvediloL (COREG) 25 mg tablet 180 Tablet 3     Sig: Take 1 tablet by mouth twice daily     Authorizing Provider: Michael Gerardo     Ordering User: Donvaon Finley    refills Per Dr. Jai Rojas verbal orders

## 2021-10-29 ENCOUNTER — ANTI-COAG VISIT (OUTPATIENT)
Dept: CARDIOLOGY CLINIC | Age: 71
End: 2021-10-29
Payer: MEDICARE

## 2021-10-29 DIAGNOSIS — I48.0 PAROXYSMAL ATRIAL FIBRILLATION (HCC): Primary | ICD-10-CM

## 2021-10-29 DIAGNOSIS — Z79.01 LONG TERM (CURRENT) USE OF ANTICOAGULANTS: ICD-10-CM

## 2021-10-29 LAB
INR BLD: 2
PT POC: NORMAL
VALID INTERNAL CONTROL?: YES

## 2021-10-29 PROCEDURE — 85610 PROTHROMBIN TIME: CPT | Performed by: SPECIALIST

## 2021-10-29 NOTE — PROGRESS NOTES
A full discussion of the nature of anticoagulants has been carried out. A benefit risk analysis has been presented to the patient, so that they understand the justification for choosing anticoagulation at this time. The need for frequent and regular monitoring, precise dosage adjustment and compliance is stressed. Side effects of potential bleeding are discussed. The patient should avoid any OTC items containing aspirin or ibuprofen, and should avoid great swings in general diet. Avoid alcohol consumption. Call if any signs of abnormal bleeding. Next PT/INR test in 1 month. Pt dose was not changed     Anticoagulation Summary  As of 10/29/2021    INR goal:  2.0-3.0   TTR:  85.1 % (4.1 y)   INR used for dosin.0 (10/29/2021)   Warfarin maintenance plan:  7.5 mg (5 mg x 1.5) every day   Weekly warfarin total:  52.5 mg   Plan last modified:  Sonali Madden RN (2021)   Next INR check:     Target end date:   Indefinite    Indications    Paroxysmal atrial fibrillation ([address] [I48.0]  Long term (current) use of anticoagulants [Z79.01]             Anticoagulation Episode Summary     INR check location:      Preferred lab:      Send INR reminders to:      Comments:        Anticoagulation Care Providers     Provider Role Specialty Phone number    Neymar Jacob MD [facility] Cardiology [phone]          Future Appointments   Date Time Provider Micheal Sepulveda   11/3/2021  9:20 AM MD MARIZOL Roche AMB

## 2021-11-03 ENCOUNTER — OFFICE VISIT (OUTPATIENT)
Dept: CARDIOLOGY CLINIC | Age: 71
End: 2021-11-03
Payer: MEDICARE

## 2021-11-03 VITALS
SYSTOLIC BLOOD PRESSURE: 110 MMHG | WEIGHT: 245 LBS | HEART RATE: 78 BPM | BODY MASS INDEX: 35.07 KG/M2 | OXYGEN SATURATION: 98 % | DIASTOLIC BLOOD PRESSURE: 60 MMHG | RESPIRATION RATE: 18 BRPM | HEIGHT: 70 IN

## 2021-11-03 DIAGNOSIS — I25.10 CORONARY ARTERY DISEASE INVOLVING NATIVE CORONARY ARTERY OF NATIVE HEART WITHOUT ANGINA PECTORIS: Primary | ICD-10-CM

## 2021-11-03 DIAGNOSIS — E11.65 TYPE 2 DIABETES MELLITUS WITH HYPERGLYCEMIA, WITHOUT LONG-TERM CURRENT USE OF INSULIN (HCC): ICD-10-CM

## 2021-11-03 DIAGNOSIS — I25.10 CORONARY ARTERY DISEASE INVOLVING NATIVE CORONARY ARTERY OF NATIVE HEART WITHOUT ANGINA PECTORIS: ICD-10-CM

## 2021-11-03 DIAGNOSIS — E78.2 MIXED HYPERLIPIDEMIA: ICD-10-CM

## 2021-11-03 DIAGNOSIS — I10 PRIMARY HYPERTENSION: ICD-10-CM

## 2021-11-03 DIAGNOSIS — Z79.01 LONG TERM (CURRENT) USE OF ANTICOAGULANTS: ICD-10-CM

## 2021-11-03 DIAGNOSIS — I49.3 PVC (PREMATURE VENTRICULAR CONTRACTION): ICD-10-CM

## 2021-11-03 DIAGNOSIS — I48.0 PAROXYSMAL ATRIAL FIBRILLATION (HCC): ICD-10-CM

## 2021-11-03 LAB
ALBUMIN SERPL-MCNC: 3.9 G/DL (ref 3.5–5)
ALBUMIN/GLOB SERPL: 1.1 {RATIO} (ref 1.1–2.2)
ALP SERPL-CCNC: 75 U/L (ref 45–117)
ALT SERPL-CCNC: 27 U/L (ref 12–78)
ANION GAP SERPL CALC-SCNC: 4 MMOL/L (ref 5–15)
AST SERPL-CCNC: 12 U/L (ref 15–37)
BILIRUB SERPL-MCNC: 0.8 MG/DL (ref 0.2–1)
BUN SERPL-MCNC: 18 MG/DL (ref 6–20)
BUN/CREAT SERPL: 21 (ref 12–20)
CALCIUM SERPL-MCNC: 9.9 MG/DL (ref 8.5–10.1)
CHLORIDE SERPL-SCNC: 106 MMOL/L (ref 97–108)
CHOLEST SERPL-MCNC: 154 MG/DL
CO2 SERPL-SCNC: 28 MMOL/L (ref 21–32)
CREAT SERPL-MCNC: 0.85 MG/DL (ref 0.7–1.3)
EST. AVERAGE GLUCOSE BLD GHB EST-MCNC: 209 MG/DL
GLOBULIN SER CALC-MCNC: 3.5 G/DL (ref 2–4)
GLUCOSE SERPL-MCNC: 268 MG/DL (ref 65–100)
HBA1C MFR BLD: 8.9 % (ref 4–5.6)
HDLC SERPL-MCNC: 31 MG/DL
HDLC SERPL: 5 {RATIO} (ref 0–5)
LDLC SERPL CALC-MCNC: 86 MG/DL (ref 0–100)
POTASSIUM SERPL-SCNC: 5 MMOL/L (ref 3.5–5.1)
PROT SERPL-MCNC: 7.4 G/DL (ref 6.4–8.2)
SODIUM SERPL-SCNC: 138 MMOL/L (ref 136–145)
TRIGL SERPL-MCNC: 185 MG/DL (ref ?–150)
VLDLC SERPL CALC-MCNC: 37 MG/DL

## 2021-11-03 PROCEDURE — G8754 DIAS BP LESS 90: HCPCS | Performed by: SPECIALIST

## 2021-11-03 PROCEDURE — G8510 SCR DEP NEG, NO PLAN REQD: HCPCS | Performed by: SPECIALIST

## 2021-11-03 PROCEDURE — G8417 CALC BMI ABV UP PARAM F/U: HCPCS | Performed by: SPECIALIST

## 2021-11-03 PROCEDURE — 2022F DILAT RTA XM EVC RTNOPTHY: CPT | Performed by: SPECIALIST

## 2021-11-03 PROCEDURE — 99213 OFFICE O/P EST LOW 20 MIN: CPT | Performed by: SPECIALIST

## 2021-11-03 PROCEDURE — G0463 HOSPITAL OUTPT CLINIC VISIT: HCPCS | Performed by: SPECIALIST

## 2021-11-03 PROCEDURE — 1101F PT FALLS ASSESS-DOCD LE1/YR: CPT | Performed by: SPECIALIST

## 2021-11-03 PROCEDURE — 3046F HEMOGLOBIN A1C LEVEL >9.0%: CPT | Performed by: SPECIALIST

## 2021-11-03 PROCEDURE — G8752 SYS BP LESS 140: HCPCS | Performed by: SPECIALIST

## 2021-11-03 PROCEDURE — G8536 NO DOC ELDER MAL SCRN: HCPCS | Performed by: SPECIALIST

## 2021-11-03 PROCEDURE — G8427 DOCREV CUR MEDS BY ELIG CLIN: HCPCS | Performed by: SPECIALIST

## 2021-11-03 PROCEDURE — 3017F COLORECTAL CA SCREEN DOC REV: CPT | Performed by: SPECIALIST

## 2021-11-03 NOTE — PROGRESS NOTES
HISTORY OF PRESENT ILLNESS  Sally Bailey is a 66 y.o. male     SUMMARY:   Problem List  Date Reviewed: 11/3/2021          Codes Class Noted    Severe obesity ([facility] ICD-10-CM: E66.01  ICD-9-CM: 278.01  3/25/2019        Long term (current) use of anticoagulants ICD-10-CM: Z79.01  ICD-9-CM: V58.61  11/12/2018        Paroxysmal atrial fibrillation ([facility] ICD-10-CM: I48.0  ICD-9-CM: 427.31  3/7/2017        Type 2 diabetes mellitus with hyperglycemia, without long-term current use of insulin ([facility] ICD-10-CM: E11.65  ICD-9-CM: 250.00, 790.29  2/23/2017        PVC (premature ventricular contraction) ICD-10-CM: I49.3  ICD-9-CM: 427.69  9/4/2014        HTN (hypertension) ICD-10-CM: I10  ICD-9-CM: 401.9  7/21/2014        Hyperlipidemia ICD-10-CM: E78.5  ICD-9-CM: 272.4  7/21/2014        CAD (coronary artery disease) ICD-10-CM: I25.10  ICD-9-CM: 414.00  7/21/2014    Overview Addendum 9/4/2014 11:55 AM by Jennifer Syed MD     12/12 abnormal stress test [place], then bms to ramus, mod distal disease, normal lvef  60-70% distal pda and lad. Current Outpatient Medications on File Prior to Visit   Medication Sig    carvediloL (COREG) 25 mg tablet Take 1 tablet by mouth twice daily    atorvastatin (LIPITOR) 40 mg tablet TAKE ONE TABLET BY MOUTH ONCE NIGHTLY    metFORMIN (GLUCOPHAGE) 500 mg tablet Take 2 Tablets by mouth two (2) times daily (with meals). Please request future refills from pcp    warfarin (COUMADIN) 5 mg tablet TAKE 1 & 1/2 (ONE & ONE-HALF) TABLETS BY MOUTH ONCE DAILY OR AS ADVISED BY COUMADIN CLINIC    MAGNESIUM PO Take 400 mg by mouth.  omeprazole (PRILOSEC) 20 mg capsule Take 20 mg by mouth daily. No current facility-administered medications on file prior to visit.        CARDIOLOGY STUDIES TO DATE:  12/12 exercise cardiolyte apical ischemia, lvef 61%  12/12 normal echo    Chief Complaint   Patient presents with    Follow-up     HPI :  He is doing well with no particular cardiac complaints. He is walking some and is actually lost a little weight since we last met. Blood pressure is well controlled, he is keeping up with his Coumadin checks, a having no problems with his medications. CARDIAC ROS:   negative for chest pain, dyspnea, palpitations, syncope, orthopnea, paroxysmal nocturnal dyspnea, exertional chest pressure/discomfort, claudication, lower extremity edema    Family History   Problem Relation Age of Onset    Heart Disease Father        Past Medical History:   Diagnosis Date    Diverticulosis     Duodenal ulcer        GENERAL ROS:  A comprehensive review of systems was negative except for that written in the HPI.     Visit Vitals  /60 (BP 1 Location: Left arm, BP Patient Position: Sitting, BP Cuff Size: Adult)   Pulse 78   Resp 18   Ht 5' 10\" (1.778 m)   Wt 245 lb (111.1 kg)   SpO2 98%   BMI 35.15 kg/m²       Wt Readings from Last 3 Encounters:   11/03/21 245 lb (111.1 kg)   04/02/21 252 lb (114.3 kg)   09/16/20 250 lb 12.8 oz (113.8 kg)            BP Readings from Last 3 Encounters:   11/03/21 110/60   04/02/21 138/86   09/16/20 110/70       PHYSICAL EXAM  General appearance: alert, cooperative, no distress, appears stated age  Neurologic: Alert and oriented X 3  Neck: supple, symmetrical, trachea midline, no adenopathy, no carotid bruit and no JVD  Lungs: clear to auscultation bilaterally  Heart: irregularly irregular rhythm, S1, S2 normal, no S3 or S4  Extremities: extremities normal, atraumatic, no cyanosis or edema    Lab Results   Component Value Date/Time    Cholesterol, total 145 09/18/2020 10:55 AM    Cholesterol, total 161 09/21/2017 10:14 AM    Cholesterol, total 148 02/23/2017 11:14 AM    Cholesterol, total 151 08/25/2016 10:37 AM    Cholesterol, total 141 03/03/2016 11:42 AM    HDL Cholesterol 29 (L) 09/18/2020 10:55 AM    HDL Cholesterol 34 (L) 09/21/2017 10:14 AM    HDL Cholesterol 31 (L) 02/23/2017 11:14 AM    HDL Cholesterol 29 (L) 08/25/2016 10:37 AM    HDL Cholesterol 28 (L) 03/03/2016 11:42 AM    LDL, calculated 87 09/18/2020 10:55 AM    LDL, calculated 93 09/21/2017 10:14 AM    LDL, calculated 84 02/23/2017 11:14 AM    LDL, calculated 93 08/25/2016 10:37 AM    LDL, calculated 91 03/03/2016 11:42 AM    LDL, calculated 102 (H) 09/03/2015 11:46 AM    Triglyceride 168 (H) 09/18/2020 10:55 AM    Triglyceride 171 (H) 09/21/2017 10:14 AM    Triglyceride 166 (H) 02/23/2017 11:14 AM    Triglyceride 145 08/25/2016 10:37 AM    Triglyceride 111 03/03/2016 11:42 AM     ASSESSMENT :      He is stable and asymptomatic, well compensated on a good medical regimen and needs no cardiac testing at this time. We will send him for some fasting blood work since has not had any for over a year. current treatment plan is effective, no change in therapy  lab results and schedule of future lab studies reviewed with patient  reviewed diet, exercise and weight control    Encounter Diagnoses   Name Primary?  Coronary artery disease involving native coronary artery of native heart without angina pectoris Yes    Paroxysmal atrial fibrillation (HCC)     PVC (premature ventricular contraction)     Primary hypertension     Mixed hyperlipidemia     Long term (current) use of anticoagulants      No orders of the defined types were placed in this encounter. Follow-up and Dispositions    · Return in about 6 months (around 5/3/2022). Beltran Prince MD  11/3/2021  Please note that this dictation was completed with [email or web address], the computer voice recognition software. Quite often unanticipated grammatical, syntax, homophones, and other interpretive errors are inadvertently transcribed by the computer software. Please disregard these errors. Please excuse any errors that have escaped final proofreading. Thank you.

## 2021-11-03 NOTE — PROGRESS NOTES
Identified pt with two pt identifiers(name and ). No chief complaint on file. Vitals:    21 0932   BP: 110/60   Pulse: 78   Resp: 18   SpO2: 98%   Weight: 245 lb (111.1 kg)   Height: 5' 10\" (1.778 m)   PainSc:   0 - No pain      Health Maintenance Due   Topic    Hepatitis C Screening     Foot Exam Q1     MICROALBUMIN Q1     Eye Exam Retinal or Dilated     COVID-19 Vaccine (1)    DTaP/Tdap/Td series (1 - Tdap)    Colorectal Cancer Screening Combo     Shingrix Vaccine Age 50> (1 of 2)    Pneumococcal 65+ years (1 of 1 - PPSV23)    Medicare Yearly Exam     Flu Vaccine (1)    A1C test (Diabetic or Prediabetic)     Lipid Screen        Depression Screening:  :     3 most recent PHQ Screens 11/3/2021   Little interest or pleasure in doing things Not at all   Feeling down, depressed, irritable, or hopeless Not at all   Total Score PHQ 2 0        Fall Risk Assessment:  :     Fall Risk Assessment, last 12 mths 11/3/2021   Able to walk? Yes   Fall in past 12 months? 0   Do you feel unsteady? 0   Are you worried about falling 0   Number of falls in past 12 months -   Fall with injury? -       Abuse Screening:  :     Abuse Screening Questionnaire 3/25/2019   Do you ever feel afraid of your partner? N   Are you in a relationship with someone who physically or mentally threatens you? N   Is it safe for you to go home?  Y        Coordination of Care Questionnaire:  :

## 2021-11-04 ENCOUNTER — TELEPHONE (OUTPATIENT)
Dept: CARDIOLOGY CLINIC | Age: 71
End: 2021-11-04

## 2021-11-04 NOTE — PROGRESS NOTES
Chol is good. Glucose is 268 and hgba1c 8.9, way too high.  Need to get with pcp and adjust diabetes meds

## 2021-11-15 DIAGNOSIS — I48.0 PAROXYSMAL ATRIAL FIBRILLATION (HCC): ICD-10-CM

## 2021-11-15 DIAGNOSIS — Z79.01 LONG TERM (CURRENT) USE OF ANTICOAGULANTS: ICD-10-CM

## 2021-11-16 RX ORDER — WARFARIN SODIUM 5 MG/1
TABLET ORAL
Qty: 135 TABLET | Refills: 0 | Status: SHIPPED | OUTPATIENT
Start: 2021-11-16 | End: 2022-02-14

## 2021-11-16 NOTE — TELEPHONE ENCOUNTER
Requested Prescriptions     Signed Prescriptions Disp Refills    warfarin (COUMADIN) 5 mg tablet 135 Tablet 0     Sig: TAKE 1 & 1/2 (ONE & ONE-HALF) TABLETS BY MOUTH ONCE DAILY OR AS ADVISED BY COUMADIN CLINIC     Authorizing Provider: Chace Melendez     Ordering User: Aislinn Bedolla    Per Dr. Josie Rodríguez verbal orders

## 2021-12-03 ENCOUNTER — ANTI-COAG VISIT (OUTPATIENT)
Dept: CARDIOLOGY CLINIC | Age: 71
End: 2021-12-03
Payer: MEDICARE

## 2021-12-03 DIAGNOSIS — Z79.01 LONG TERM (CURRENT) USE OF ANTICOAGULANTS: ICD-10-CM

## 2021-12-03 DIAGNOSIS — I48.0 PAROXYSMAL ATRIAL FIBRILLATION (HCC): Primary | ICD-10-CM

## 2021-12-03 LAB
INR BLD: 2.1
PT POC: NORMAL
VALID INTERNAL CONTROL?: YES

## 2021-12-03 PROCEDURE — 85610 PROTHROMBIN TIME: CPT | Performed by: SPECIALIST

## 2021-12-03 NOTE — PROGRESS NOTES
A full discussion of the nature of anticoagulants has been carried out. A benefit risk analysis has been presented to the patient, so that they understand the justification for choosing anticoagulation at this time. The need for frequent and regular monitoring, precise dosage adjustment and compliance is stressed. Side effects of potential bleeding are discussed. The patient should avoid any OTC items containing aspirin or ibuprofen, and should avoid great swings in general diet. Avoid alcohol consumption. Call if any signs of abnormal bleeding. Next PT/INR test in 1 month. Pt dose was not changed     Anticoagulation Summary  As of 12/3/2021    INR goal:  2.0-3.0   TTR:  85.4 % (4.2 y)   INR used for dosin.1 (12/3/2021)   Warfarin maintenance plan:  7.5 mg (5 mg x 1.5) every day   Weekly warfarin total:  52.5 mg   Plan last modified:  Juan Pablo Benton RN (2021)   Next INR check:  1/3/2022   Target end date: Indefinite    Indications    Paroxysmal atrial fibrillation ([facility] [address] [I48.0]  Long term (current) use of anticoagulants [Z79.01]             Anticoagulation Episode Summary     INR check location:      Preferred lab:      Send INR reminders to:      Comments:        Anticoagulation Care Providers     Provider Role Specialty Phone number    Rosalea Runner, MD [facility] Cardiology [phone]          Future Appointments   Date Time Provider Micheal Sepulveda   1/3/2022 10:00 AM [address] BROWNE    2022  9:40 AM Rosalea Runner, MD CAVREY BS AMB

## 2022-01-03 NOTE — PATIENT INSTRUCTIONS
Today your INR was 1.9. Your goal INR is  2.0-3.0. You have a  5 mg tablet of Coumadin (warfarin). Take Coumadin (warfarin) as follows: Take 7.5 mg (1.5 tablets) every day    Come back in 2 week(s) for your next finger stick/INR blood test.        Avoid any over the counter items containing aspirin or ibuprofen, and avoid great swings in general diet. Avoid alcohol consumption. Please notify the INR nurse if you are started on any new medication including over the counter or herbal supplements. Also, please notify your INR nurse if any of your other prescription or over the counter medications have been discontinued. Call [facility] at [phone] if you have any signs of abnormal bleeding/blood clot.  ------------------------------------------------------------------------------------------------------------------  Taking Warfarin Safely: Care Instructions    Your Care Instructions  Warfarin is a medicine that you take to prevent blood clots. It is often called a blood thinner. Doctors give warfarin (such as Coumadin) to reduce the risk of blood clots. You may be at risk for blood clots if you have atrial fibrillation or deep vein thrombosis. Some other health problems may also put you at risk. Warfarin slows the amount of time it takes for your blood to clot. It can cause bleeding problems. Even if you've been taking warfarin for a while, it's important to know how to take it safely. Foods and other medicines can affect the way warfarin works. Some can make warfarin work too well. This can cause bleeding problems. And some can make it work poorly, so that it does not prevent blood clots very well. You will need regular blood tests to check how long it takes for your blood to form a clot. This test is called a PT or prothrombin time test. The result of the test is called an INR level.  Depending on the test results, your doctor or anticoagulation clinic may adjust your dose of warfarin. Follow-up care is a key part of your treatment and safety. Be sure to make and go to all appointments, and call your doctor if you are having problems. It's also a good idea to know your test results and keep a list of the medicines you take. How can you care for yourself at home? Take warfarin safely  · Take your warfarin at the same time each day. · If you miss a dose of warfarin, don't take an extra dose to make up for it. Your doctor can tell you exactly what to do so you don't take too much or too little. · Wear medical alert jewelry that lets others know that you take warfarin. You can buy this at most drugstores. · Don't take warfarin if you are pregnant or planning to get pregnant. Talk to your doctor about how you can prevent getting pregnant while you are taking it. · Don't change your dose or stop taking warfarin unless your doctor tells you to. Effects of medicines and food on warfarin  · Don't start or stop taking any medicines, vitamins, or natural remedies unless you first talk to your doctor. Many medicines can affect how warfarin works. These include aspirin and other pain relievers, over-the-counter medicines, multivitamins, dietary supplements, and herbal products. · Tell all of your doctors and pharmacists that you take warfarin. Some prescription medicines can affect how warfarin works. · Keep the amount of vitamin K in your diet about the same from day to day. Do not suddenly eat a lot more or a lot less food that is rich in vitamin K than you usually do. Vitamin K affects how warfarin works and how your blood clots. Talk with your doctor before making big changes in your diet. Vitamin K is in many foods, such as:  ¨ Leafy greens, such as kale, cabbage, spinach, Swiss chard, and lettuce. ¨ Canola and soybean oils. ¨ Green vegetables, such as asparagus, broccoli, and [place] sprouts. ¨ Vegetable drinks, green tea leaves, and some dietary supplement drinks.   · Avoid cranberry juice and other cranberry products. They can increase the effects of warfarin. · Limit your use of alcohol. Avoid bleeding by preventing falls and injuries  · Wear slippers or shoes with nonskid soles. · Remove throw rugs and clutter. · Rearrange furniture and electrical cords to keep them out of walking paths. · Keep stairways, porches, and outside walkways well lit. Use night-lights in hallways and bathrooms. · Be extra careful when you work with sharp tools or knives. When should you call for help? Call 911 anytime you think you may need emergency care. For example, call if:  · You have a sudden, severe headache that is different from past headaches. Call your doctor now or seek immediate medical care if:  · You have any abnormal bleeding, such as:  ¨ Nosebleeds. ¨ Vaginal bleeding that is different (heavier, more frequent, at a different time of the month) than what you are used to. ¨ Bloody or black stools, or rectal bleeding. ¨ Bloody or pink urine. Watch closely for changes in your health, and be sure to contact your doctor if you have any problems. Where can you learn more? Go to http://www.gray.com/. Enter P133 in the search box to learn more about \"Taking Warfarin Safely: Care Instructions. \"  Current as of: January 27, 2016  Content Version: 11.1  © 9016-8075 [employer]. Care instructions adapted under license by [employer] (which disclaims liability or warranty for this information). If you have questions about a medical condition or this instruction, always ask your healthcare professional. [place] [address] any warranty or liability for your use of this information.

## 2022-01-03 NOTE — PROGRESS NOTES
Pharmacy Progress Note - Anticoagulation Management    S/O:  Mr. Chito Jorge  is a 66 y.o. male seen today for anticoagulation management for the diagnosis of Atrial Fibrillation. HPI: At last visit (12/03) INR was 2.1 and therapeutic. Patient was instructed to continue current warfarin regimen and recheck INR in 4 weeks. Due to inclement weather, patient rescheduled to 1/13. Interim History:    · Warfarin start date: Prior to November 2018 per chart review  · INR Goal:  2.0-3.0    · Current warfarin regimen: 7.5 mg every day                      · Warfarin tablet strength:   5 mg   · Duration of therapy: Indefinite    Today's pertinent positives includes:  No significant changes since last visit    Results for orders placed or performed in visit on 01/13/22   POC PROTHROMBIN W/INR   Result Value Ref Range    VALID INTERNAL CONTROL POC Yes     Prothrombin time (POC) 23.3 seconds    INR POC 1.9        · Adherence:   · Able to recall regimen? YES  · Miss/extra dose? NO  · Need refill? NO    Upcoming procedure(s):  NO      Past Medical History:   Diagnosis Date    Diverticulosis     Duodenal ulcer      Allergies   Allergen Reactions    Enalapril Cough    Hydrochlorothiazide Other (comments)     Weakness, fatigue, very sick, extreme weight loss     Current Outpatient Medications   Medication Sig    warfarin (COUMADIN) 5 mg tablet TAKE 1 & 1/2 (ONE & ONE-HALF) TABLETS BY MOUTH ONCE DAILY OR AS ADVISED BY COUMADIN CLINIC    carvediloL (COREG) 25 mg tablet Take 1 tablet by mouth twice daily    atorvastatin (LIPITOR) 40 mg tablet TAKE ONE TABLET BY MOUTH ONCE NIGHTLY    metFORMIN (GLUCOPHAGE) 500 mg tablet Take 2 Tablets by mouth two (2) times daily (with meals). Please request future refills from pcp    MAGNESIUM PO Take 400 mg by mouth.  omeprazole (PRILOSEC) 20 mg capsule Take 20 mg by mouth daily. No current facility-administered medications for this visit.      Wt Readings from Last 3 Encounters:   01/13/22 241 lb (109.3 kg)   11/03/21 245 lb (111.1 kg)   04/02/21 252 lb (114.3 kg)     BP Readings from Last 3 Encounters:   01/13/22 (!) 138/91   11/03/21 110/60   04/02/21 138/86     Pulse Readings from Last 3 Encounters:   01/13/22 76   11/03/21 78   04/02/21 (!) 102     No results found for: WBC, WBCLT, HGBPOC, HGB, HGBP, HCTPOC, HCT, PHCT, RBCH, PLT, MCV, HGBEXT, HCTEXT, PLTEXT, HGBEXT, HCTEXT, PLTEXT  Lab Results   Component Value Date/Time    Sodium 138 11/03/2021 10:03 AM    Potassium 5.0 11/03/2021 10:03 AM    Chloride 106 11/03/2021 10:03 AM    CO2 28 11/03/2021 10:03 AM    Anion gap 4 (L) 11/03/2021 10:03 AM    Glucose 268 (H) 11/03/2021 10:03 AM    BUN 18 11/03/2021 10:03 AM    Creatinine 0.85 11/03/2021 10:03 AM    BUN/Creatinine ratio 21 (H) 11/03/2021 10:03 AM    GFR est AA >60 11/03/2021 10:03 AM    GFR est non-AA >60 11/03/2021 10:03 AM    Calcium 9.9 11/03/2021 10:03 AM    Bilirubin, total 0.8 11/03/2021 10:03 AM    Alk. phosphatase 75 11/03/2021 10:03 AM    Protein, total 7.4 11/03/2021 10:03 AM    Albumin 3.9 11/03/2021 10:03 AM    Globulin 3.5 11/03/2021 10:03 AM    A-G Ratio 1.1 11/03/2021 10:03 AM    ALT (SGPT) 27 11/03/2021 10:03 AM     CrCl cannot be calculated (Unknown ideal weight.). INR History:   (normal INR range 0.8-1.2)     Date   INR   PT   Dose/Comments  01/13/22 1.9  23.3 No changes  12/03/21 2.1   7.5 mg daily      A/P:       Anticoagulation:  Considering Mr. Rommel Fierro past history, todays findings, and per Anticoagulation Collaborative Practice Agreement/Protocol:    1. Fingerstick POC INR (1.9) is Subtherapeutic for INR goal today. 2.  Continue warfarin 7.5 mg daily  3. Patient denies changes to his medications and reports consistent intake of vitamin K foods. Patient will continue current regimen and recheck INR in 2 weeks. Patient was instructed to schedule an appointment in 2 week(s) prior to leaving the clinic.     Medication reconciliation was completed during the visit. There are no discontinued medications. A full discussion of the nature of anticoagulants has been carried out. A full discussion of the need for frequent and regular monitoring, precise dosage adjustment and compliance was stressed. Side effects of potential bleeding were discussed and Mr. Marroquin was instructed to call [phone] if there are any signs of abnormal bleeding. Mr. Marroquin was instructed to avoid any OTC items containing aspirin or ibuprofen and prior to starting any new OTC products to consult with his physician or pharmacist to ensure no drug interactions are present. Mr. Marroquin was instructed to avoid any major changes in his general diet and to avoid alcohol consumption. Mr. Marroquin was provided information in the AVS that includes topics on understanding coumadin therapy, drug interaction considerations, vitamin K and coumadin use, interactions with foods and supplements containing vitamin K, and the use of herbal products. Mr. Marroquin verbalized his understanding of all instructions and will call the office with any questions, concerns, or signs of abnormal bleeding or blood clot. Notifications of recommendations will be sent to Dr. Toni To MD for review.     Thank you,  Socorro Paget, C/ Arcenio Ryan 77 Tracking Only     Intervention Detail: Adherence Monitorin and Lab(s) Ordered   Total # of Interventions Recommended: 2   Total # of Interventions Accepted: 2   Time Spent (min): 20

## 2022-01-13 ENCOUNTER — ANTI-COAG VISIT (OUTPATIENT)
Dept: PHARMACY | Age: 72
End: 2022-01-13
Payer: MEDICARE

## 2022-01-13 VITALS
SYSTOLIC BLOOD PRESSURE: 138 MMHG | BODY MASS INDEX: 34.5 KG/M2 | HEART RATE: 76 BPM | WEIGHT: 241 LBS | HEIGHT: 70 IN | DIASTOLIC BLOOD PRESSURE: 91 MMHG

## 2022-01-13 DIAGNOSIS — I48.0 PAROXYSMAL ATRIAL FIBRILLATION (HCC): Primary | ICD-10-CM

## 2022-01-13 DIAGNOSIS — Z79.01 LONG TERM (CURRENT) USE OF ANTICOAGULANTS: ICD-10-CM

## 2022-01-13 LAB
INR BLD: 1.9
PT POC: 23.3 SECONDS
VALID INTERNAL CONTROL?: YES

## 2022-01-13 PROCEDURE — 99211 OFF/OP EST MAY X REQ PHY/QHP: CPT

## 2022-01-13 PROCEDURE — 85610 PROTHROMBIN TIME: CPT

## 2022-01-31 NOTE — PROGRESS NOTES
Pharmacy Progress Note - Anticoagulation Management    S/O:  Mr. Juwan Laird  is a 66 y.o. male seen today for anticoagulation management for the diagnosis of Atrial Fibrillation. HPI: At last visit (01/13) INR was 1.9 and subtherapeutic. Patient was instructed to continue current warfarin regimen and recheck INR in 2 weeks. Patient rescheduled from 1/31 to 2/2. Interim History:    · Warfarin start date: Prior to November 2018 per chart review  · INR Goal:  2.0-3.0    · Current warfarin regimen: 7.5 mg every day                      · Warfarin tablet strength:   5 mg   · Duration of therapy: Indefinite    Today's pertinent positives includes:  No significant changes since last visit    Results for orders placed or performed in visit on 02/02/22   POC PROTHROMBIN W/INR   Result Value Ref Range    VALID INTERNAL CONTROL POC Yes     Prothrombin time (POC) 25.4 seconds    INR POC 2.1      · Adherence:   · Able to recall regimen? YES  · Miss/extra dose? NO  · Need refill? NO    Upcoming procedure(s):  NO      Past Medical History:   Diagnosis Date    Diverticulosis     Duodenal ulcer      Allergies   Allergen Reactions    Enalapril Cough    Hydrochlorothiazide Other (comments)     Weakness, fatigue, very sick, extreme weight loss     Current Outpatient Medications   Medication Sig    warfarin (COUMADIN) 5 mg tablet TAKE 1 & 1/2 (ONE & ONE-HALF) TABLETS BY MOUTH ONCE DAILY OR AS ADVISED BY COUMADIN CLINIC    carvediloL (COREG) 25 mg tablet Take 1 tablet by mouth twice daily    atorvastatin (LIPITOR) 40 mg tablet TAKE ONE TABLET BY MOUTH ONCE NIGHTLY    metFORMIN (GLUCOPHAGE) 500 mg tablet Take 2 Tablets by mouth two (2) times daily (with meals). Please request future refills from pcp    MAGNESIUM PO Take 400 mg by mouth.  omeprazole (PRILOSEC) 20 mg capsule Take 20 mg by mouth daily. No current facility-administered medications for this visit.      Wt Readings from Last 3 Encounters:   02/02/22 240 lb (108.9 kg)   01/13/22 241 lb (109.3 kg)   11/03/21 245 lb (111.1 kg)     BP Readings from Last 3 Encounters:   02/02/22 136/87   01/13/22 (!) 138/91   11/03/21 110/60     Pulse Readings from Last 3 Encounters:   02/02/22 93   01/13/22 76   11/03/21 78     No results found for: WBC, WBCLT, HGBPOC, HGB, HGBP, HCTPOC, HCT, PHCT, RBCH, PLT, MCV, HGBEXT, HCTEXT, PLTEXT, HGBEXT, HCTEXT, PLTEXT  Lab Results   Component Value Date/Time    Sodium 138 11/03/2021 10:03 AM    Potassium 5.0 11/03/2021 10:03 AM    Chloride 106 11/03/2021 10:03 AM    CO2 28 11/03/2021 10:03 AM    Anion gap 4 (L) 11/03/2021 10:03 AM    Glucose 268 (H) 11/03/2021 10:03 AM    BUN 18 11/03/2021 10:03 AM    Creatinine 0.85 11/03/2021 10:03 AM    BUN/Creatinine ratio 21 (H) 11/03/2021 10:03 AM    GFR est AA >60 11/03/2021 10:03 AM    GFR est non-AA >60 11/03/2021 10:03 AM    Calcium 9.9 11/03/2021 10:03 AM    Bilirubin, total 0.8 11/03/2021 10:03 AM    Alk. phosphatase 75 11/03/2021 10:03 AM    Protein, total 7.4 11/03/2021 10:03 AM    Albumin 3.9 11/03/2021 10:03 AM    Globulin 3.5 11/03/2021 10:03 AM    A-G Ratio 1.1 11/03/2021 10:03 AM    ALT (SGPT) 27 11/03/2021 10:03 AM     CrCl cannot be calculated (Unknown ideal weight.). INR History:   (normal INR range 0.8-1.2)     Date   INR   PT   Dose/Comments  02/02/22 2.1  25.4 No changes  01/13/22 1.9  23.3 No changes  12/03/21 2.1   7.5 mg daily      A/P:       Anticoagulation:  Considering Mr. Beltran Castellano past history, todays findings, and per Anticoagulation Collaborative Practice Agreement/Protocol:    1. Fingerstick POC INR (2.1) is Therapeutic for INR goal today. 2.  Continue warfarin 7.5 mg daily  3. Patient denies changes to his medications and reports consistent intake of vitamin K foods. Patient will continue current regimen and recheck INR in 4 weeks. Patient was instructed to schedule an appointment in 4 week(s) prior to leaving the clinic.     Medication reconciliation was completed during the visit. There are no discontinued medications. A full discussion of the nature of anticoagulants has been carried out. A full discussion of the need for frequent and regular monitoring, precise dosage adjustment and compliance was stressed. Side effects of potential bleeding were discussed and Mr. Marroquin was instructed to call [phone] if there are any signs of abnormal bleeding. Mr. Marroquin was instructed to avoid any OTC items containing aspirin or ibuprofen and prior to starting any new OTC products to consult with his physician or pharmacist to ensure no drug interactions are present. Mr. Marroquin was instructed to avoid any major changes in his general diet and to avoid alcohol consumption. Mr. Marroquin was provided information in the AVS that includes topics on understanding coumadin therapy, drug interaction considerations, vitamin K and coumadin use, interactions with foods and supplements containing vitamin K, and the use of herbal products. Mr. Marroquin verbalized his understanding of all instructions and will call the office with any questions, concerns, or signs of abnormal bleeding or blood clot. Notifications of recommendations will be sent to Dr. Toribio Ibarra MD for review.     Thank you,  KALYAN Cunningham/ Arcenio Ryan 77 Tracking Only     Intervention Detail: Adherence Monitorin and Lab(s) Ordered   Total # of Interventions Recommended: 2   Total # of Interventions Accepted: 2   Time Spent (min): 20

## 2022-02-01 NOTE — PATIENT INSTRUCTIONS
Today your INR was 2.1. Your goal INR is  2.0-3.0. You have a  5 mg tablet of Coumadin (warfarin). Take Coumadin (warfarin) as follows: Take 7.5 mg (1.5 tablets) every day    Come back in 4 week(s) for your next finger stick/INR blood test.        Avoid any over the counter items containing aspirin or ibuprofen, and avoid great swings in general diet. Avoid alcohol consumption. Please notify the INR nurse if you are started on any new medication including over the counter or herbal supplements. Also, please notify your INR nurse if any of your other prescription or over the counter medications have been discontinued. Your Care Instructions  Warfarin is a medicine that you take to prevent blood clots. It is often called a blood thinner. Doctors give warfarin (such as Coumadin) to reduce the risk of blood clots. You may be at risk for blood clots if you have atrial fibrillation or deep vein thrombosis. Some other health problems may also put you at risk. Warfarin slows the amount of time it takes for your blood to clot. It can cause bleeding problems. Even if you've been taking warfarin for a while, it's important to know how to take it safely. Foods and other medicines can affect the way warfarin works. Some can make warfarin work too well. This can cause bleeding problems. And some can make it work poorly, so that it does not prevent blood clots very well. You will need regular blood tests to check how long it takes for your blood to form a clot. This test is called a PT or prothrombin time test. The result of the test is called an INR level. Depending on the test results, your doctor or anticoagulation clinic may adjust your dose of warfarin. Follow-up care is a key part of your treatment and safety. Be sure to make and go to all appointments, and call your doctor if you are having problems. It's also a good idea to know your test results and keep a list of the medicines you take.     How can you care for yourself at home? Take warfarin safely  · Take your warfarin at the same time each day. · If you miss a dose of warfarin, don't take an extra dose to make up for it. Your doctor can tell you exactly what to do so you don't take too much or too little. · Wear medical alert jewelry that lets others know that you take warfarin. You can buy this at most drugstores. · Don't take warfarin if you are pregnant or planning to get pregnant. Talk to your doctor about how you can prevent getting pregnant while you are taking it. · Don't change your dose or stop taking warfarin unless your doctor tells you to. Effects of medicines and food on warfarin  · Don't start or stop taking any medicines, vitamins, or natural remedies unless you first talk to your doctor. Many medicines can affect how warfarin works. These include aspirin and other pain relievers, over-the-counter medicines, multivitamins, dietary supplements, and herbal products. · Tell all of your doctors and pharmacists that you take warfarin. Some prescription medicines can affect how warfarin works. · Keep the amount of vitamin K in your diet about the same from day to day. Do not suddenly eat a lot more or a lot less food that is rich in vitamin K than you usually do. Vitamin K affects how warfarin works and how your blood clots. Talk with your doctor before making big changes in your diet. Vitamin K is in many foods, such as:  ¨ Leafy greens, such as kale, cabbage, spinach, Swiss chard, and lettuce. ¨ Canola and soybean oils. ¨ Green vegetables, such as asparagus, broccoli, and [place] sprouts. ¨ Vegetable drinks, green tea leaves, and some dietary supplement drinks. · Avoid cranberry juice and other cranberry products. They can increase the effects of warfarin. · Limit your use of alcohol. Avoid bleeding by preventing falls and injuries  · Wear slippers or shoes with nonskid soles. · Remove throw rugs and clutter.   · Rearrange furniture and electrical cords to keep them out of walking paths. · Keep stairways, porches, and outside walkways well lit. Use night-lights in hallways and bathrooms. · Be extra careful when you work with sharp tools or knives. When should you call for help? Call 911 anytime you think you may need emergency care. For example, call if:  · You have a sudden, severe headache that is different from past headaches. Call your doctor now or seek immediate medical care if:  · You have any abnormal bleeding, such as:  ¨ Nosebleeds. ¨ Vaginal bleeding that is different (heavier, more frequent, at a different time of the month) than what you are used to. ¨ Bloody or black stools, or rectal bleeding. ¨ Bloody or pink urine. Watch closely for changes in your health, and be sure to contact your doctor if you have any problems. Where can you learn more? Go to http://www.gray.com/. Enter R444 in the search box to learn more about \"Taking Warfarin Safely: Care Instructions. \"  Current as of: January 27, 2016  Content Version: 11.1  © 5656-1038 [employer]. Care instructions adapted under license by [employer] (which disclaims liability or warranty for this information). If you have questions about a medical condition or this instruction, always ask your healthcare professional. [place] [address] any warranty or liability for your use of this information.

## 2022-02-02 ENCOUNTER — ANTI-COAG VISIT (OUTPATIENT)
Dept: PHARMACY | Age: 72
End: 2022-02-02
Payer: MEDICARE

## 2022-02-02 VITALS
HEIGHT: 70 IN | SYSTOLIC BLOOD PRESSURE: 136 MMHG | WEIGHT: 240 LBS | BODY MASS INDEX: 34.36 KG/M2 | DIASTOLIC BLOOD PRESSURE: 87 MMHG | HEART RATE: 93 BPM

## 2022-02-02 DIAGNOSIS — I48.0 PAROXYSMAL ATRIAL FIBRILLATION (HCC): Primary | ICD-10-CM

## 2022-02-02 DIAGNOSIS — Z79.01 LONG TERM (CURRENT) USE OF ANTICOAGULANTS: ICD-10-CM

## 2022-02-02 LAB
INR BLD: 2.1
PT POC: 25.4 SECONDS
VALID INTERNAL CONTROL?: YES

## 2022-02-02 PROCEDURE — 85610 PROTHROMBIN TIME: CPT

## 2022-02-02 PROCEDURE — 99211 OFF/OP EST MAY X REQ PHY/QHP: CPT

## 2022-02-28 NOTE — PROGRESS NOTES
Pharmacy Progress Note - Anticoagulation Management    S/O:  Mr. Cara Sheldon  is a 66 y.o. male seen today for anticoagulation management for the diagnosis of Atrial Fibrillation. HPI: At last visit (2/2) INR was 2.1 and therapeutic. Patient was instructed to continue current warfarin regimen and recheck INR in 4 weeks. Interim History:    · Warfarin start date: Prior to November 2018 per chart review  · INR Goal:  2.0-3.0    · Current warfarin regimen: 7.5 mg every day                      · Warfarin tablet strength:   5 mg   · Duration of therapy: Indefinite    Today's pertinent positives includes:  No significant changes since last visit    Results for orders placed or performed in visit on 03/02/22   POC PROTHROMBIN W/INR   Result Value Ref Range    VALID INTERNAL CONTROL POC Yes     Prothrombin time (POC) 25.0 seconds    INR POC 2.1      · Adherence:   · Able to recall regimen? YES  · Miss/extra dose? NO  · Need refill? NO    Upcoming procedure(s):  NO      Past Medical History:   Diagnosis Date    Diverticulosis     Duodenal ulcer      Allergies   Allergen Reactions    Enalapril Cough    Hydrochlorothiazide Other (comments)     Weakness, fatigue, very sick, extreme weight loss     Current Outpatient Medications   Medication Sig    warfarin (COUMADIN) 5 mg tablet TAKE 1 & 1/2 (ONE & ONE-HALF) TABLETS BY MOUTH ONCE DAILY OR AS ADVISED BY COUMADIN CLINIC    carvediloL (COREG) 25 mg tablet Take 1 tablet by mouth twice daily    atorvastatin (LIPITOR) 40 mg tablet TAKE ONE TABLET BY MOUTH ONCE NIGHTLY    metFORMIN (GLUCOPHAGE) 500 mg tablet Take 2 Tablets by mouth two (2) times daily (with meals). Please request future refills from pcp    MAGNESIUM PO Take 400 mg by mouth.  omeprazole (PRILOSEC) 20 mg capsule Take 20 mg by mouth daily. No current facility-administered medications for this visit.      Wt Readings from Last 3 Encounters:   03/02/22 237 lb (107.5 kg)   02/02/22 240 lb (108.9 kg)   01/13/22 241 lb (109.3 kg)     BP Readings from Last 3 Encounters:   02/02/22 136/87   01/13/22 (!) 138/91   11/03/21 110/60     Pulse Readings from Last 3 Encounters:   02/02/22 93   01/13/22 76   11/03/21 78     No results found for: WBC, WBCLT, HGBPOC, HGB, HGBP, HCTPOC, HCT, PHCT, RBCH, PLT, MCV, HGBEXT, HCTEXT, PLTEXT, HGBEXT, HCTEXT, PLTEXT  Lab Results   Component Value Date/Time    Sodium 138 11/03/2021 10:03 AM    Potassium 5.0 11/03/2021 10:03 AM    Chloride 106 11/03/2021 10:03 AM    CO2 28 11/03/2021 10:03 AM    Anion gap 4 (L) 11/03/2021 10:03 AM    Glucose 268 (H) 11/03/2021 10:03 AM    BUN 18 11/03/2021 10:03 AM    Creatinine 0.85 11/03/2021 10:03 AM    BUN/Creatinine ratio 21 (H) 11/03/2021 10:03 AM    GFR est AA >60 11/03/2021 10:03 AM    GFR est non-AA >60 11/03/2021 10:03 AM    Calcium 9.9 11/03/2021 10:03 AM    Bilirubin, total 0.8 11/03/2021 10:03 AM    Alk. phosphatase 75 11/03/2021 10:03 AM    Protein, total 7.4 11/03/2021 10:03 AM    Albumin 3.9 11/03/2021 10:03 AM    Globulin 3.5 11/03/2021 10:03 AM    A-G Ratio 1.1 11/03/2021 10:03 AM    ALT (SGPT) 27 11/03/2021 10:03 AM     CrCl cannot be calculated (Unknown ideal weight.). INR History:   (normal INR range 0.8-1.2)     Date   INR   PT   Dose/Comments  03/02/22 2.1  25.0 No changes  02/02/22 2.1  25.4 No changes  01/13/22 1.9  23.3 No changes  12/03/21 2.1   7.5 mg daily      A/P:       Anticoagulation:  Considering Mr. Lesley Campos past history, todays findings, and per Anticoagulation Collaborative Practice Agreement/Protocol:    1. Fingerstick POC INR (2.1) is Therapeutic for INR goal today. 2.  Continue warfarin 7.5 mg daily  3. Patient denies changes to his medications and reports consistent intake of vitamin K foods. Patient will continue current regimen and recheck INR in 4 weeks. Patient was instructed to schedule an appointment in 4 week(s) prior to leaving the clinic.     Medication reconciliation was completed during the visit. There are no discontinued medications. A full discussion of the nature of anticoagulants has been carried out. A full discussion of the need for frequent and regular monitoring, precise dosage adjustment and compliance was stressed. Side effects of potential bleeding were discussed and Mr. Marroquin was instructed to call [phone] if there are any signs of abnormal bleeding. Mr. Marroquin was instructed to avoid any OTC items containing aspirin or ibuprofen and prior to starting any new OTC products to consult with his physician or pharmacist to ensure no drug interactions are present. Mr. Marroquin was instructed to avoid any major changes in his general diet and to avoid alcohol consumption. Mr. Marroquin was provided information in the AVS that includes topics on understanding coumadin therapy, drug interaction considerations, vitamin K and coumadin use, interactions with foods and supplements containing vitamin K, and the use of herbal products. Mr. Marroquin verbalized his understanding of all instructions and will call the office with any questions, concerns, or signs of abnormal bleeding or blood clot. Notifications of recommendations will be sent to Dr. Keiko Burgess MD for review.     Thank you,  KALYAN Head/ Arcenio Ryan 77 Tracking Only     Intervention Detail: Adherence Monitorin and Lab(s) Ordered   Total # of Interventions Recommended: 2   Total # of Interventions Accepted: 2   Time Spent (min): 20

## 2022-02-28 NOTE — PATIENT INSTRUCTIONS
Today your INR was 2.1. Your goal INR is  2.0-3.0. You have a  5 mg tablet of Coumadin (warfarin). Take Coumadin (warfarin) as follows: Take 7.5 mg (1.5 tablets) every day    Come back in 4 week(s) for your next finger stick/INR blood test.        Avoid any over the counter items containing aspirin or ibuprofen, and avoid great swings in general diet. Avoid alcohol consumption. Please notify the INR nurse if you are started on any new medication including over the counter or herbal supplements. Also, please notify your INR nurse if any of your other prescription or over the counter medications have been discontinued. Your Care Instructions  Warfarin is a medicine that you take to prevent blood clots. It is often called a blood thinner. Doctors give warfarin (such as Coumadin) to reduce the risk of blood clots. You may be at risk for blood clots if you have atrial fibrillation or deep vein thrombosis. Some other health problems may also put you at risk. Warfarin slows the amount of time it takes for your blood to clot. It can cause bleeding problems. Even if you've been taking warfarin for a while, it's important to know how to take it safely. Foods and other medicines can affect the way warfarin works. Some can make warfarin work too well. This can cause bleeding problems. And some can make it work poorly, so that it does not prevent blood clots very well. You will need regular blood tests to check how long it takes for your blood to form a clot. This test is called a PT or prothrombin time test. The result of the test is called an INR level. Depending on the test results, your doctor or anticoagulation clinic may adjust your dose of warfarin. Follow-up care is a key part of your treatment and safety. Be sure to make and go to all appointments, and call your doctor if you are having problems. It's also a good idea to know your test results and keep a list of the medicines you take.     How can you care for yourself at home? Take warfarin safely  · Take your warfarin at the same time each day. · If you miss a dose of warfarin, don't take an extra dose to make up for it. Your doctor can tell you exactly what to do so you don't take too much or too little. · Wear medical alert jewelry that lets others know that you take warfarin. You can buy this at most drugstores. · Don't take warfarin if you are pregnant or planning to get pregnant. Talk to your doctor about how you can prevent getting pregnant while you are taking it. · Don't change your dose or stop taking warfarin unless your doctor tells you to. Effects of medicines and food on warfarin  · Don't start or stop taking any medicines, vitamins, or natural remedies unless you first talk to your doctor. Many medicines can affect how warfarin works. These include aspirin and other pain relievers, over-the-counter medicines, multivitamins, dietary supplements, and herbal products. · Tell all of your doctors and pharmacists that you take warfarin. Some prescription medicines can affect how warfarin works. · Keep the amount of vitamin K in your diet about the same from day to day. Do not suddenly eat a lot more or a lot less food that is rich in vitamin K than you usually do. Vitamin K affects how warfarin works and how your blood clots. Talk with your doctor before making big changes in your diet. Vitamin K is in many foods, such as:  ¨ Leafy greens, such as kale, cabbage, spinach, Swiss chard, and lettuce. ¨ Canola and soybean oils. ¨ Green vegetables, such as asparagus, broccoli, and [place] sprouts. ¨ Vegetable drinks, green tea leaves, and some dietary supplement drinks. · Avoid cranberry juice and other cranberry products. They can increase the effects of warfarin. · Limit your use of alcohol. Avoid bleeding by preventing falls and injuries  · Wear slippers or shoes with nonskid soles. · Remove throw rugs and clutter.   · Rearrange furniture and electrical cords to keep them out of walking paths. · Keep stairways, porches, and outside walkways well lit. Use night-lights in hallways and bathrooms. · Be extra careful when you work with sharp tools or knives. When should you call for help? Call 911 anytime you think you may need emergency care. For example, call if:  · You have a sudden, severe headache that is different from past headaches. Call your doctor now or seek immediate medical care if:  · You have any abnormal bleeding, such as:  ¨ Nosebleeds. ¨ Vaginal bleeding that is different (heavier, more frequent, at a different time of the month) than what you are used to. ¨ Bloody or black stools, or rectal bleeding. ¨ Bloody or pink urine. Watch closely for changes in your health, and be sure to contact your doctor if you have any problems. Where can you learn more? Go to http://www.gray.com/. Enter Q300 in the search box to learn more about \"Taking Warfarin Safely: Care Instructions. \"  Current as of: January 27, 2016  Content Version: 11.1  © 3971-2575 [employer]. Care instructions adapted under license by [employer] (which disclaims liability or warranty for this information). If you have questions about a medical condition or this instruction, always ask your healthcare professional. [place] [address] any warranty or liability for your use of this information.

## 2022-03-02 ENCOUNTER — ANTI-COAG VISIT (OUTPATIENT)
Dept: PHARMACY | Age: 72
End: 2022-03-02
Payer: MEDICARE

## 2022-03-02 VITALS
HEART RATE: 81 BPM | DIASTOLIC BLOOD PRESSURE: 89 MMHG | BODY MASS INDEX: 33.93 KG/M2 | HEIGHT: 70 IN | WEIGHT: 237 LBS | SYSTOLIC BLOOD PRESSURE: 131 MMHG

## 2022-03-02 DIAGNOSIS — Z79.01 LONG TERM (CURRENT) USE OF ANTICOAGULANTS: ICD-10-CM

## 2022-03-02 DIAGNOSIS — I48.0 PAROXYSMAL ATRIAL FIBRILLATION (HCC): Primary | ICD-10-CM

## 2022-03-02 LAB
INR BLD: 2.1
PT POC: 25 SECONDS
VALID INTERNAL CONTROL?: YES

## 2022-03-02 PROCEDURE — 99211 OFF/OP EST MAY X REQ PHY/QHP: CPT

## 2022-03-02 PROCEDURE — 85610 PROTHROMBIN TIME: CPT

## 2022-03-18 PROBLEM — I48.0 PAROXYSMAL ATRIAL FIBRILLATION (HCC): Status: ACTIVE | Noted: 2017-03-07

## 2022-03-19 PROBLEM — Z79.01 LONG TERM (CURRENT) USE OF ANTICOAGULANTS: Status: ACTIVE | Noted: 2018-11-12

## 2022-03-19 PROBLEM — E66.01 SEVERE OBESITY (HCC): Status: ACTIVE | Noted: 2019-03-25

## 2022-03-20 PROBLEM — E11.65 TYPE 2 DIABETES MELLITUS WITH HYPERGLYCEMIA, WITHOUT LONG-TERM CURRENT USE OF INSULIN (HCC): Status: ACTIVE | Noted: 2017-02-23

## 2022-03-23 NOTE — PATIENT INSTRUCTIONS
Today your INR was 2.2. Your goal INR is  2.0-3.0. You have a  5 mg tablet of Coumadin (warfarin). Take Coumadin (warfarin) as follows: Take 7.5 mg (1.5 tablets) every day    Come back in 4 week(s) for your next finger stick/INR blood test.        Avoid any over the counter items containing aspirin or ibuprofen, and avoid great swings in general diet. Avoid alcohol consumption. Please notify the INR nurse if you are started on any new medication including over the counter or herbal supplements. Also, please notify your INR nurse if any of your other prescription or over the counter medications have been discontinued. Your Care Instructions  Warfarin is a medicine that you take to prevent blood clots. It is often called a blood thinner. Doctors give warfarin (such as Coumadin) to reduce the risk of blood clots. You may be at risk for blood clots if you have atrial fibrillation or deep vein thrombosis. Some other health problems may also put you at risk. Warfarin slows the amount of time it takes for your blood to clot. It can cause bleeding problems. Even if you've been taking warfarin for a while, it's important to know how to take it safely. Foods and other medicines can affect the way warfarin works. Some can make warfarin work too well. This can cause bleeding problems. And some can make it work poorly, so that it does not prevent blood clots very well. You will need regular blood tests to check how long it takes for your blood to form a clot. This test is called a PT or prothrombin time test. The result of the test is called an INR level. Depending on the test results, your doctor or anticoagulation clinic may adjust your dose of warfarin. Follow-up care is a key part of your treatment and safety. Be sure to make and go to all appointments, and call your doctor if you are having problems. It's also a good idea to know your test results and keep a list of the medicines you take.     How can you care for yourself at home? Take warfarin safely  · Take your warfarin at the same time each day. · If you miss a dose of warfarin, don't take an extra dose to make up for it. Your doctor can tell you exactly what to do so you don't take too much or too little. · Wear medical alert jewelry that lets others know that you take warfarin. You can buy this at most drugstores. · Don't take warfarin if you are pregnant or planning to get pregnant. Talk to your doctor about how you can prevent getting pregnant while you are taking it. · Don't change your dose or stop taking warfarin unless your doctor tells you to. Effects of medicines and food on warfarin  · Don't start or stop taking any medicines, vitamins, or natural remedies unless you first talk to your doctor. Many medicines can affect how warfarin works. These include aspirin and other pain relievers, over-the-counter medicines, multivitamins, dietary supplements, and herbal products. · Tell all of your doctors and pharmacists that you take warfarin. Some prescription medicines can affect how warfarin works. · Keep the amount of vitamin K in your diet about the same from day to day. Do not suddenly eat a lot more or a lot less food that is rich in vitamin K than you usually do. Vitamin K affects how warfarin works and how your blood clots. Talk with your doctor before making big changes in your diet. Vitamin K is in many foods, such as:  ¨ Leafy greens, such as kale, cabbage, spinach, Swiss chard, and lettuce. ¨ Canola and soybean oils. ¨ Green vegetables, such as asparagus, broccoli, and [place] sprouts. ¨ Vegetable drinks, green tea leaves, and some dietary supplement drinks. · Avoid cranberry juice and other cranberry products. They can increase the effects of warfarin. · Limit your use of alcohol. Avoid bleeding by preventing falls and injuries  · Wear slippers or shoes with nonskid soles. · Remove throw rugs and clutter.   · Rearrange furniture and electrical cords to keep them out of walking paths. · Keep stairways, porches, and outside walkways well lit. Use night-lights in hallways and bathrooms. · Be extra careful when you work with sharp tools or knives. When should you call for help? Call 911 anytime you think you may need emergency care. For example, call if:  · You have a sudden, severe headache that is different from past headaches. Call your doctor now or seek immediate medical care if:  · You have any abnormal bleeding, such as:  ¨ Nosebleeds. ¨ Vaginal bleeding that is different (heavier, more frequent, at a different time of the month) than what you are used to. ¨ Bloody or black stools, or rectal bleeding. ¨ Bloody or pink urine. Watch closely for changes in your health, and be sure to contact your doctor if you have any problems. Where can you learn more? Go to http://www.gray.com/. Enter W236 in the search box to learn more about \"Taking Warfarin Safely: Care Instructions. \"  Current as of: January 27, 2016  Content Version: 11.1  © 1122-2829 [employer]. Care instructions adapted under license by [employer] (which disclaims liability or warranty for this information). If you have questions about a medical condition or this instruction, always ask your healthcare professional. [place] [address] any warranty or liability for your use of this information.

## 2022-03-30 ENCOUNTER — ANTI-COAG VISIT (OUTPATIENT)
Dept: PHARMACY | Age: 72
End: 2022-03-30
Payer: MEDICARE

## 2022-03-30 VITALS
HEART RATE: 83 BPM | BODY MASS INDEX: 33.86 KG/M2 | SYSTOLIC BLOOD PRESSURE: 124 MMHG | DIASTOLIC BLOOD PRESSURE: 91 MMHG | WEIGHT: 236 LBS

## 2022-03-30 DIAGNOSIS — Z79.01 LONG TERM (CURRENT) USE OF ANTICOAGULANTS: ICD-10-CM

## 2022-03-30 DIAGNOSIS — I48.0 PAROXYSMAL ATRIAL FIBRILLATION (HCC): Primary | ICD-10-CM

## 2022-03-30 LAB
INR BLD: 2.2
PT POC: 26.7 SECONDS
VALID INTERNAL CONTROL?: YES

## 2022-03-30 PROCEDURE — 85610 PROTHROMBIN TIME: CPT

## 2022-03-30 NOTE — PROGRESS NOTES
Pharmacy Progress Note - Anticoagulation Management    S/O:  Mr. Kishan Jasmine  is a 67 y.o. male seen today for anticoagulation management for the diagnosis of Atrial Fibrillation. HPI: At last visit (3/2) INR was 2.1 and therapeutic. Patient was instructed to continue current warfarin regimen and recheck INR in 4 weeks. Interim History:    · Warfarin start date: Prior to November 2018 per chart review  · INR Goal:  2.0-3.0    · Current warfarin regimen: 7.5 mg every day                      · Warfarin tablet strength:   5 mg   · Duration of therapy: Indefinite    Today's pertinent positives includes:  No significant changes since last visit    Results for orders placed or performed in visit on 03/30/22   POC PROTHROMBIN W/INR   Result Value Ref Range    VALID INTERNAL CONTROL POC Yes     Prothrombin time (POC) 26.7 seconds    INR POC 2.2        · Adherence:   · Able to recall regimen? YES  · Miss/extra dose? NO  · Need refill? NO    Upcoming procedure(s):  NO      Past Medical History:   Diagnosis Date    Diverticulosis     Duodenal ulcer      Allergies   Allergen Reactions    Enalapril Cough    Hydrochlorothiazide Other (comments)     Weakness, fatigue, very sick, extreme weight loss     Current Outpatient Medications   Medication Sig    warfarin (COUMADIN) 5 mg tablet TAKE 1 & 1/2 (ONE & ONE-HALF) TABLETS BY MOUTH ONCE DAILY OR AS ADVISED BY COUMADIN CLINIC    carvediloL (COREG) 25 mg tablet Take 1 tablet by mouth twice daily    atorvastatin (LIPITOR) 40 mg tablet TAKE ONE TABLET BY MOUTH ONCE NIGHTLY    metFORMIN (GLUCOPHAGE) 500 mg tablet Take 2 Tablets by mouth two (2) times daily (with meals). Please request future refills from pcp    MAGNESIUM PO Take 400 mg by mouth.  omeprazole (PRILOSEC) 20 mg capsule Take 20 mg by mouth daily. No current facility-administered medications for this visit.      Wt Readings from Last 3 Encounters:   03/30/22 236 lb (107 kg)   03/02/22 237 lb (107.5 kg)   02/02/22 240 lb (108.9 kg)     BP Readings from Last 3 Encounters:   03/30/22 (!) 124/91   03/02/22 131/89   02/02/22 136/87     Pulse Readings from Last 3 Encounters:   03/30/22 83   03/02/22 81   02/02/22 93     No results found for: WBC, WBCLT, HGBPOC, HGB, HGBP, HCTPOC, HCT, PHCT, RBCH, PLT, MCV, HGBEXT, HCTEXT, PLTEXT  Lab Results   Component Value Date/Time    Sodium 138 11/03/2021 10:03 AM    Potassium 5.0 11/03/2021 10:03 AM    Chloride 106 11/03/2021 10:03 AM    CO2 28 11/03/2021 10:03 AM    Anion gap 4 (L) 11/03/2021 10:03 AM    Glucose 268 (H) 11/03/2021 10:03 AM    BUN 18 11/03/2021 10:03 AM    Creatinine 0.85 11/03/2021 10:03 AM    BUN/Creatinine ratio 21 (H) 11/03/2021 10:03 AM    GFR est AA >60 11/03/2021 10:03 AM    GFR est non-AA >60 11/03/2021 10:03 AM    Calcium 9.9 11/03/2021 10:03 AM    Bilirubin, total 0.8 11/03/2021 10:03 AM    Alk. phosphatase 75 11/03/2021 10:03 AM    Protein, total 7.4 11/03/2021 10:03 AM    Albumin 3.9 11/03/2021 10:03 AM    Globulin 3.5 11/03/2021 10:03 AM    A-G Ratio 1.1 11/03/2021 10:03 AM    ALT (SGPT) 27 11/03/2021 10:03 AM     Estimated Creatinine Clearance: 94.8 mL/min (by C-G formula based on SCr of 0.85 mg/dL). INR History:   (normal INR range 0.8-1.2)     Date   INR   PT   Dose/Comments  03/30/22          2.2                   26.7     No changes  03/02/22          2.1                   25.0     No changes  02/02/22          2.1                   25.4     No changes  01/13/22          1.9                   23.3     No changes  12/03/21          2.1                               7.5 mg daily    A/P:       Anticoagulation:  Considering Mr. Skinner Levo past history, todays findings, and per Anticoagulation Collaborative Practice Agreement/Protocol:    1. Fingerstick POC INR (2.2) is Therapeutic for INR goal today. 2. Continue warfarin 7.5 mg daily  3. Patient denies changes to his medications and reports consistent intake of vitamin K foods.   Patient will continue current regimen and recheck INR in 4 weeks. Patient was instructed to schedule an appointment in 4 week(s) prior to leaving the clinic. Medication reconciliation was completed during the visit. A full discussion of the nature of anticoagulants has been carried out. A full discussion of the need for frequent and regular monitoring, precise dosage adjustment and compliance was stressed. Side effects of potential bleeding were discussed and Mr. Marroquin was instructed to call [phone] if there are any signs of abnormal bleeding. Mr. Marroquin was instructed to avoid any OTC items containing aspirin or ibuprofen and prior to starting any new OTC products to consult with his physician or pharmacist to ensure no drug interactions are present. Mr. Marroquin was instructed to avoid any major changes in his general diet and to avoid alcohol consumption. Mr. Marroquin was provided information in the AVS that includes topics on understanding coumadin therapy, drug interaction considerations, vitamin K and coumadin use, interactions with foods and supplements containing vitamin K, and the use of herbal products. Mr. Marroquin verbalized his understanding of all instructions and will call the office with any questions, concerns, or signs of abnormal bleeding or blood clot. Notifications of recommendations will be sent to Dr. Sonia Villagran MD  for review.     Thank you,  Job Torre, PharmD, BCPS        For Pharmacy Admin Tracking Only     Intervention Detail: Adherence Monitorin and Lab(s) Ordered   Total # of Interventions Recommended: 2   Total # of Interventions Accepted: 2   Time Spent (min): 20

## 2022-04-21 NOTE — PATIENT INSTRUCTIONS
Today your INR was 2.2. Your goal INR is  2.0-3.0. You have a  5 mg tablet of Coumadin (warfarin). Take Coumadin (warfarin) as follows: Take 7.5 mg (1.5 tablets) every day    Come back in 4 week(s) for your next finger stick/INR blood test.        Avoid any over the counter items containing aspirin or ibuprofen, and avoid great swings in general diet. Avoid alcohol consumption. Please notify the INR nurse if you are started on any new medication including over the counter or herbal supplements. Also, please notify your INR nurse if any of your other prescription or over the counter medications have been discontinued. Your Care Instructions  Warfarin is a medicine that you take to prevent blood clots. It is often called a blood thinner. Doctors give warfarin (such as Coumadin) to reduce the risk of blood clots. You may be at risk for blood clots if you have atrial fibrillation or deep vein thrombosis. Some other health problems may also put you at risk. Warfarin slows the amount of time it takes for your blood to clot. It can cause bleeding problems. Even if you've been taking warfarin for a while, it's important to know how to take it safely. Foods and other medicines can affect the way warfarin works. Some can make warfarin work too well. This can cause bleeding problems. And some can make it work poorly, so that it does not prevent blood clots very well. You will need regular blood tests to check how long it takes for your blood to form a clot. This test is called a PT or prothrombin time test. The result of the test is called an INR level. Depending on the test results, your doctor or anticoagulation clinic may adjust your dose of warfarin. Follow-up care is a key part of your treatment and safety. Be sure to make and go to all appointments, and call your doctor if you are having problems. It's also a good idea to know your test results and keep a list of the medicines you take.     How can you care for yourself at home? Take warfarin safely  · Take your warfarin at the same time each day. · If you miss a dose of warfarin, don't take an extra dose to make up for it. Your doctor can tell you exactly what to do so you don't take too much or too little. · Wear medical alert jewelry that lets others know that you take warfarin. You can buy this at most drugstores. · Don't take warfarin if you are pregnant or planning to get pregnant. Talk to your doctor about how you can prevent getting pregnant while you are taking it. · Don't change your dose or stop taking warfarin unless your doctor tells you to. Effects of medicines and food on warfarin  · Don't start or stop taking any medicines, vitamins, or natural remedies unless you first talk to your doctor. Many medicines can affect how warfarin works. These include aspirin and other pain relievers, over-the-counter medicines, multivitamins, dietary supplements, and herbal products. · Tell all of your doctors and pharmacists that you take warfarin. Some prescription medicines can affect how warfarin works. · Keep the amount of vitamin K in your diet about the same from day to day. Do not suddenly eat a lot more or a lot less food that is rich in vitamin K than you usually do. Vitamin K affects how warfarin works and how your blood clots. Talk with your doctor before making big changes in your diet. Vitamin K is in many foods, such as:  ¨ Leafy greens, such as kale, cabbage, spinach, Swiss chard, and lettuce. ¨ Canola and soybean oils. ¨ Green vegetables, such as asparagus, broccoli, and [place] sprouts. ¨ Vegetable drinks, green tea leaves, and some dietary supplement drinks. · Avoid cranberry juice and other cranberry products. They can increase the effects of warfarin. · Limit your use of alcohol. Avoid bleeding by preventing falls and injuries  · Wear slippers or shoes with nonskid soles. · Remove throw rugs and clutter.   · Rearrange furniture and electrical cords to keep them out of walking paths. · Keep stairways, porches, and outside walkways well lit. Use night-lights in hallways and bathrooms. · Be extra careful when you work with sharp tools or knives. When should you call for help? Call 911 anytime you think you may need emergency care. For example, call if:  · You have a sudden, severe headache that is different from past headaches. Call your doctor now or seek immediate medical care if:  · You have any abnormal bleeding, such as:  ¨ Nosebleeds. ¨ Vaginal bleeding that is different (heavier, more frequent, at a different time of the month) than what you are used to. ¨ Bloody or black stools, or rectal bleeding. ¨ Bloody or pink urine. Watch closely for changes in your health, and be sure to contact your doctor if you have any problems. Where can you learn more? Go to http://www.gray.com/. Enter A912 in the search box to learn more about \"Taking Warfarin Safely: Care Instructions. \"  Current as of: January 27, 2016  Content Version: 11.1  © 4814-3099 [employer]. Care instructions adapted under license by [employer] (which disclaims liability or warranty for this information). If you have questions about a medical condition or this instruction, always ask your healthcare professional. [place] [address] any warranty or liability for your use of this information.

## 2022-04-21 NOTE — PROGRESS NOTES
Pharmacy Progress Note - Anticoagulation Management    S/O:  Mr. Leonila Reis  is a 67 y.o. male seen today for anticoagulation management for the diagnosis of Atrial Fibrillation. HPI: At last visit (3/30) INR was 2.2 and therapeutic. Patient was instructed to continue current warfarin regimen and recheck INR in 4 weeks. Interim History:    · Warfarin start date: Prior to November 2018 per chart review  · INR Goal:  2.0-3.0    · Current warfarin regimen: 7.5 mg every day                      · Warfarin tablet strength:   5 mg   · Duration of therapy: Indefinite    Today's pertinent positives includes:  No significant changes since last visit    Results for orders placed or performed in visit on 04/27/22   POC PROTHROMBIN W/INR   Result Value Ref Range    VALID INTERNAL CONTROL POC Yes     Prothrombin time (POC) 25.9 seconds    INR POC 2.2      · Adherence:   · Able to recall regimen? YES  · Miss/extra dose? NO  · Need refill? NO    Upcoming procedure(s):  NO      Past Medical History:   Diagnosis Date    Diverticulosis     Duodenal ulcer      Allergies   Allergen Reactions    Enalapril Cough    Hydrochlorothiazide Other (comments)     Weakness, fatigue, very sick, extreme weight loss     Current Outpatient Medications   Medication Sig    warfarin (COUMADIN) 5 mg tablet TAKE 1 & 1/2 (ONE & ONE-HALF) TABLETS BY MOUTH ONCE DAILY OR AS ADVISED BY COUMADIN CLINIC    carvediloL (COREG) 25 mg tablet Take 1 tablet by mouth twice daily    atorvastatin (LIPITOR) 40 mg tablet TAKE ONE TABLET BY MOUTH ONCE NIGHTLY    metFORMIN (GLUCOPHAGE) 500 mg tablet Take 2 Tablets by mouth two (2) times daily (with meals). Please request future refills from pcp    MAGNESIUM PO Take 400 mg by mouth.  omeprazole (PRILOSEC) 20 mg capsule Take 20 mg by mouth daily. No current facility-administered medications for this visit.      Wt Readings from Last 3 Encounters:   04/27/22 237 lb (107.5 kg)   03/30/22 236 lb (107 kg)   03/02/22 237 lb (107.5 kg)     BP Readings from Last 3 Encounters:   04/27/22 (!) 135/91   03/30/22 (!) 124/91   03/02/22 131/89     Pulse Readings from Last 3 Encounters:   04/27/22 87   03/30/22 83   03/02/22 81     No results found for: WBC, WBCLT, HGBPOC, HGB, HGBP, HCTPOC, HCT, PHCT, RBCH, PLT, MCV, HGBEXT, HCTEXT, PLTEXT, HGBEXT, HCTEXT, PLTEXT  Lab Results   Component Value Date/Time    Sodium 138 11/03/2021 10:03 AM    Potassium 5.0 11/03/2021 10:03 AM    Chloride 106 11/03/2021 10:03 AM    CO2 28 11/03/2021 10:03 AM    Anion gap 4 (L) 11/03/2021 10:03 AM    Glucose 268 (H) 11/03/2021 10:03 AM    BUN 18 11/03/2021 10:03 AM    Creatinine 0.85 11/03/2021 10:03 AM    BUN/Creatinine ratio 21 (H) 11/03/2021 10:03 AM    GFR est AA >60 11/03/2021 10:03 AM    GFR est non-AA >60 11/03/2021 10:03 AM    Calcium 9.9 11/03/2021 10:03 AM    Bilirubin, total 0.8 11/03/2021 10:03 AM    Alk. phosphatase 75 11/03/2021 10:03 AM    Protein, total 7.4 11/03/2021 10:03 AM    Albumin 3.9 11/03/2021 10:03 AM    Globulin 3.5 11/03/2021 10:03 AM    A-G Ratio 1.1 11/03/2021 10:03 AM    ALT (SGPT) 27 11/03/2021 10:03 AM     CrCl cannot be calculated (Unknown ideal weight.). INR History:   (normal INR range 0.8-1.2)     Date   INR   PT   Dose/Comments  04/27/22 2.2  25.9 No changes  03/30/22          2.2                   26.7     No changes  03/02/22          2.1                   25.0     No changes  02/02/22          2.1                   25.4     No changes  01/13/22          1.9                   23.3     No changes  12/03/21          2.1                               7.5 mg daily    A/P:       Anticoagulation:  Considering Mr. Bobby Altamirano past history, todays findings, and per Anticoagulation Collaborative Practice Agreement/Protocol:    1. Fingerstick POC INR (2.2) is Therapeutic for INR goal today. 2. Continue warfarin 7.5 mg daily  3.  Patient denies changes to his medications and reports consistent intake of vitamin K foods. Patient will continue current regimen and recheck INR in 4 weeks. Patient was instructed to schedule an appointment in 4 week(s) prior to leaving the clinic. Medication reconciliation was completed during the visit. A full discussion of the nature of anticoagulants has been carried out. A full discussion of the need for frequent and regular monitoring, precise dosage adjustment and compliance was stressed. Side effects of potential bleeding were discussed and Mr. Marroquin was instructed to call [phone] if there are any signs of abnormal bleeding. Mr. Marroquin was instructed to avoid any OTC items containing aspirin or ibuprofen and prior to starting any new OTC products to consult with his physician or pharmacist to ensure no drug interactions are present. Mr. Marroquin was instructed to avoid any major changes in his general diet and to avoid alcohol consumption. Mr. Marroquin was provided information in the AVS that includes topics on understanding coumadin therapy, drug interaction considerations, vitamin K and coumadin use, interactions with foods and supplements containing vitamin K, and the use of herbal products. Mr. Marroquin verbalized his understanding of all instructions and will call the office with any questions, concerns, or signs of abnormal bleeding or blood clot. Notifications of recommendations will be sent to Dr. Mccall [facility], MD  for review.     Thank you,  Bernadette Herrera, PharmD      For Pharmacy Admin Tracking Only     Intervention Detail: Adherence Monitorin and Lab(s) Ordered   Total # of Interventions Recommended: 2   Total # of Interventions Accepted: 2   Time Spent (min): 20

## 2022-04-27 ENCOUNTER — ANTI-COAG VISIT (OUTPATIENT)
Dept: PHARMACY | Age: 72
End: 2022-04-27
Payer: MEDICARE

## 2022-04-27 VITALS
HEART RATE: 87 BPM | SYSTOLIC BLOOD PRESSURE: 135 MMHG | WEIGHT: 237 LBS | DIASTOLIC BLOOD PRESSURE: 91 MMHG | BODY MASS INDEX: 33.93 KG/M2 | HEIGHT: 70 IN

## 2022-04-27 DIAGNOSIS — Z79.01 LONG TERM (CURRENT) USE OF ANTICOAGULANTS: ICD-10-CM

## 2022-04-27 DIAGNOSIS — I48.0 PAROXYSMAL ATRIAL FIBRILLATION (HCC): Primary | ICD-10-CM

## 2022-04-27 LAB
INR BLD: 2.2
PT POC: 25.9 SECONDS
VALID INTERNAL CONTROL?: YES

## 2022-04-27 PROCEDURE — 99211 OFF/OP EST MAY X REQ PHY/QHP: CPT

## 2022-04-27 PROCEDURE — 85610 PROTHROMBIN TIME: CPT

## 2022-05-04 ENCOUNTER — OFFICE VISIT (OUTPATIENT)
Dept: CARDIOLOGY CLINIC | Age: 72
End: 2022-05-04
Payer: MEDICARE

## 2022-05-04 VITALS
HEART RATE: 84 BPM | SYSTOLIC BLOOD PRESSURE: 116 MMHG | OXYGEN SATURATION: 98 % | RESPIRATION RATE: 16 BRPM | WEIGHT: 235 LBS | BODY MASS INDEX: 33.64 KG/M2 | HEIGHT: 70 IN | DIASTOLIC BLOOD PRESSURE: 80 MMHG

## 2022-05-04 DIAGNOSIS — E11.65 TYPE 2 DIABETES MELLITUS WITH HYPERGLYCEMIA, WITHOUT LONG-TERM CURRENT USE OF INSULIN (HCC): ICD-10-CM

## 2022-05-04 DIAGNOSIS — I10 PRIMARY HYPERTENSION: ICD-10-CM

## 2022-05-04 DIAGNOSIS — I25.10 CORONARY ARTERY DISEASE INVOLVING NATIVE CORONARY ARTERY OF NATIVE HEART WITHOUT ANGINA PECTORIS: ICD-10-CM

## 2022-05-04 DIAGNOSIS — I49.3 PVC (PREMATURE VENTRICULAR CONTRACTION): ICD-10-CM

## 2022-05-04 DIAGNOSIS — Z79.01 LONG TERM (CURRENT) USE OF ANTICOAGULANTS: ICD-10-CM

## 2022-05-04 DIAGNOSIS — I48.0 PAROXYSMAL ATRIAL FIBRILLATION (HCC): Primary | ICD-10-CM

## 2022-05-04 PROCEDURE — G0463 HOSPITAL OUTPT CLINIC VISIT: HCPCS | Performed by: SPECIALIST

## 2022-05-04 PROCEDURE — 3017F COLORECTAL CA SCREEN DOC REV: CPT | Performed by: SPECIALIST

## 2022-05-04 PROCEDURE — G8427 DOCREV CUR MEDS BY ELIG CLIN: HCPCS | Performed by: SPECIALIST

## 2022-05-04 PROCEDURE — 3046F HEMOGLOBIN A1C LEVEL >9.0%: CPT | Performed by: SPECIALIST

## 2022-05-04 PROCEDURE — G8752 SYS BP LESS 140: HCPCS | Performed by: SPECIALIST

## 2022-05-04 PROCEDURE — G8417 CALC BMI ABV UP PARAM F/U: HCPCS | Performed by: SPECIALIST

## 2022-05-04 PROCEDURE — 99213 OFFICE O/P EST LOW 20 MIN: CPT | Performed by: SPECIALIST

## 2022-05-04 PROCEDURE — 2022F DILAT RTA XM EVC RTNOPTHY: CPT | Performed by: SPECIALIST

## 2022-05-04 PROCEDURE — 1101F PT FALLS ASSESS-DOCD LE1/YR: CPT | Performed by: SPECIALIST

## 2022-05-04 PROCEDURE — G8536 NO DOC ELDER MAL SCRN: HCPCS | Performed by: SPECIALIST

## 2022-05-04 PROCEDURE — G8432 DEP SCR NOT DOC, RNG: HCPCS | Performed by: SPECIALIST

## 2022-05-04 PROCEDURE — G8754 DIAS BP LESS 90: HCPCS | Performed by: SPECIALIST

## 2022-05-04 RX ORDER — METFORMIN HYDROCHLORIDE 500 MG/1
1000 TABLET ORAL 2 TIMES DAILY WITH MEALS
Qty: 360 TABLET | Refills: 0 | Status: SHIPPED | OUTPATIENT
Start: 2022-05-04

## 2022-05-04 NOTE — PROGRESS NOTES
HISTORY OF PRESENT ILLNESS  Carly Figueroa is a 67 y.o. male     SUMMARY:   Problem List  Date Reviewed: 5/4/2022          Codes Class Noted    Severe obesity ([facility] ICD-10-CM: E66.01  ICD-9-CM: 278.01  3/25/2019        Long term (current) use of anticoagulants ICD-10-CM: Z79.01  ICD-9-CM: V58.61  11/12/2018        Paroxysmal atrial fibrillation ([facility] ICD-10-CM: I48.0  ICD-9-CM: 427.31  3/7/2017        Type 2 diabetes mellitus with hyperglycemia, without long-term current use of insulin ([facility] ICD-10-CM: E11.65  ICD-9-CM: 250.00, 790.29  2/23/2017        PVC (premature ventricular contraction) ICD-10-CM: I49.3  ICD-9-CM: 427.69  9/4/2014        HTN (hypertension) ICD-10-CM: I10  ICD-9-CM: 401.9  7/21/2014        Hyperlipidemia ICD-10-CM: E78.5  ICD-9-CM: 272.4  7/21/2014        CAD (coronary artery disease) ICD-10-CM: I25.10  ICD-9-CM: 414.00  7/21/2014    Overview Addendum 9/4/2014 11:55 AM by Tereza Mittal MD     12/12 abnormal stress test [place], then bms to ramus, mod distal disease, normal lvef  60-70% distal pda and lad. Current Outpatient Medications on File Prior to Visit   Medication Sig    warfarin (COUMADIN) 5 mg tablet TAKE 1 & 1/2 (ONE & ONE-HALF) TABLETS BY MOUTH ONCE DAILY OR AS ADVISED BY COUMADIN CLINIC    carvediloL (COREG) 25 mg tablet Take 1 tablet by mouth twice daily    atorvastatin (LIPITOR) 40 mg tablet TAKE ONE TABLET BY MOUTH ONCE NIGHTLY    MAGNESIUM PO Take 400 mg by mouth.  omeprazole (PRILOSEC) 20 mg capsule Take 20 mg by mouth daily.  metFORMIN (GLUCOPHAGE) 500 mg tablet Take 2 Tablets by mouth two (2) times daily (with meals). Please request future refills from pcp     No current facility-administered medications on file prior to visit.        CARDIOLOGY STUDIES TO DATE:  12/12 exercise cardiolyte apical ischemia, lvef 61%  12/12 normal echo    Chief Complaint   Patient presents with    Follow-up     HPI :  He is doing well with no cardiac complaints. He has been getting out walking more lately and that feels good. 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 He is keeping regular follow-up with his Coumadin. He still has not gotten a primary care and he is out of metformin and his sugars are running in the high 200s. Lipid profile in November looked great  CARDIAC ROS:   negative for chest pain, dyspnea, palpitations, syncope, orthopnea, paroxysmal nocturnal dyspnea, exertional chest pressure/discomfort, claudication, lower extremity edema    Family History   Problem Relation Age of Onset    Heart Disease Father        Past Medical History:   Diagnosis Date    Diverticulosis     Duodenal ulcer        GENERAL ROS:  A comprehensive review of systems was negative except for that written in the HPI.     Visit Vitals  /80   Pulse 84   Resp 16   Ht 5' 10\" (1.778 m)   Wt 235 lb (106.6 kg)   SpO2 98%   BMI 33.72 kg/m²       Wt Readings from Last 3 Encounters:   05/04/22 235 lb (106.6 kg)   04/27/22 237 lb (107.5 kg)   03/30/22 236 lb (107 kg)            BP Readings from Last 3 Encounters:   05/04/22 116/80   04/27/22 (!) 135/91   03/30/22 (!) 124/91       PHYSICAL EXAM  General appearance: alert, cooperative, no distress, appears stated age  Neurologic: Alert and oriented X 3  Neck: supple, symmetrical, trachea midline, no adenopathy, no carotid bruit and no JVD  Lungs: clear to auscultation bilaterally  Heart: irregularly irregular rhythm, S1, S2 normal, no S3 or S4  Extremities: extremities normal, atraumatic, no cyanosis or edema    Lab Results   Component Value Date/Time    Cholesterol, total 154 11/03/2021 10:03 AM    Cholesterol, total 145 09/18/2020 10:55 AM    Cholesterol, total 161 09/21/2017 10:14 AM    Cholesterol, total 148 02/23/2017 11:14 AM    Cholesterol, total 151 08/25/2016 10:37 AM    HDL Cholesterol 31 11/03/2021 10:03 AM    HDL Cholesterol 29 (L) 09/18/2020 10:55 AM    HDL Cholesterol 34 (L) 09/21/2017 10:14 AM    HDL Cholesterol 31 (L) 02/23/2017 11:14 AM    HDL Cholesterol 29 (L) 08/25/2016 10:37 AM    LDL, calculated 86 11/03/2021 10:03 AM    LDL, calculated 87 09/18/2020 10:55 AM    LDL, calculated 93 09/21/2017 10:14 AM    LDL, calculated 84 02/23/2017 11:14 AM    LDL, calculated 93 08/25/2016 10:37 AM    LDL, calculated 91 03/03/2016 11:42 AM    Triglyceride 185 (H) 11/03/2021 10:03 AM    Triglyceride 168 (H) 09/18/2020 10:55 AM    Triglyceride 171 (H) 09/21/2017 10:14 AM    Triglyceride 166 (H) 02/23/2017 11:14 AM    Triglyceride 145 08/25/2016 10:37 AM    CHOL/HDL Ratio 5.0 11/03/2021 10:03 AM     ASSESSMENT :      He is stable and asymptomatic from a cardiac standpoint on a reasonable medical regimen. I will go ahead and give him 90 days of metformin and we also again gave him the list of 3 [facility] primary care's and he promises he will find somebody to manage his other medical needs. current treatment plan is effective, no change in therapy  lab results and schedule of future lab studies reviewed with patient  reviewed diet, exercise and weight control    Encounter Diagnoses   Name Primary?  Paroxysmal atrial fibrillation (HCC) Yes    PVC (premature ventricular contraction)     Primary hypertension     Type 2 diabetes mellitus with hyperglycemia, without long-term current use of insulin ([facility]     Coronary artery disease involving native coronary artery of native heart without angina pectoris     Long term (current) use of anticoagulants      No orders of the defined types were placed in this encounter. Follow-up and Dispositions    · Return in about 6 months (around 11/4/2022). Lulú Aj MD  5/4/2022  Please note that this dictation was completed with [employer], the [employer] voice recognition software.   Quite often unanticipated grammatical, syntax, homophones, and other interpretive errors are inadvertently transcribed by the computer software. Please disregard these errors. Please excuse any errors that have escaped final proofreading. Thank you.

## 2022-05-17 NOTE — PROGRESS NOTES
Pharmacy Progress Note - Anticoagulation Management    S/O:  Mr. Juan Muñoz  is a 67 y.o. male seen today for anticoagulation management for the diagnosis of Atrial Fibrillation. HPI: At last visit (4/27) INR was 2.2 and therapeutic. Patient was instructed to continue current warfarin regimen and recheck INR in 4 weeks. Interim History:    · Warfarin start date: Prior to November 2018 per chart review  · INR Goal:  2.0-3.0    · Current warfarin regimen: 7.5 mg every day                      · Warfarin tablet strength:   5 mg   · Duration of therapy: Indefinite    Today's pertinent positives includes:  Medication change    Results for orders placed or performed in visit on 05/25/22   POC PROTHROMBIN W/INR   Result Value Ref Range    VALID INTERNAL CONTROL POC Yes     Prothrombin time (POC) 23.2 seconds    INR POC 1.9      · Adherence:   · Able to recall regimen? YES  · Miss/extra dose? NO  · Need refill? NO    Upcoming procedure(s):  NO      Past Medical History:   Diagnosis Date    Diverticulosis     Duodenal ulcer      Allergies   Allergen Reactions    Enalapril Cough    Hydrochlorothiazide Other (comments)     Weakness, fatigue, very sick, extreme weight loss     Current Outpatient Medications   Medication Sig    warfarin (COUMADIN) 5 mg tablet Take 7.5 mg (1.5 tablets) every day or take as directed by clinic provider    metFORMIN (GLUCOPHAGE) 500 mg tablet Take 2 Tablets by mouth two (2) times daily (with meals). Please request future refills from pcp    carvediloL (COREG) 25 mg tablet Take 1 tablet by mouth twice daily    atorvastatin (LIPITOR) 40 mg tablet TAKE ONE TABLET BY MOUTH ONCE NIGHTLY    MAGNESIUM PO Take 400 mg by mouth.  omeprazole (PRILOSEC) 20 mg capsule Take 20 mg by mouth daily. No current facility-administered medications for this visit.      Wt Readings from Last 3 Encounters:   05/25/22 239 lb (108.4 kg)   05/04/22 235 lb (106.6 kg)   04/27/22 237 lb (107.5 kg) BP Readings from Last 3 Encounters:   05/25/22 120/80   05/04/22 116/80   04/27/22 (!) 135/91     Pulse Readings from Last 3 Encounters:   05/25/22 80   05/04/22 84   04/27/22 87     No results found for: WBC, WBCLT, HGBPOC, HGB, HGBP, HCTPOC, HCT, PHCT, RBCH, PLT, MCV, HGBEXT, HCTEXT, PLTEXT, HGBEXT, HCTEXT, PLTEXT  Lab Results   Component Value Date/Time    Sodium 138 11/03/2021 10:03 AM    Potassium 5.0 11/03/2021 10:03 AM    Chloride 106 11/03/2021 10:03 AM    CO2 28 11/03/2021 10:03 AM    Anion gap 4 (L) 11/03/2021 10:03 AM    Glucose 268 (H) 11/03/2021 10:03 AM    BUN 18 11/03/2021 10:03 AM    Creatinine 0.85 11/03/2021 10:03 AM    BUN/Creatinine ratio 21 (H) 11/03/2021 10:03 AM    GFR est AA >60 11/03/2021 10:03 AM    GFR est non-AA >60 11/03/2021 10:03 AM    Calcium 9.9 11/03/2021 10:03 AM    Bilirubin, total 0.8 11/03/2021 10:03 AM    Alk. phosphatase 75 11/03/2021 10:03 AM    Protein, total 7.4 11/03/2021 10:03 AM    Albumin 3.9 11/03/2021 10:03 AM    Globulin 3.5 11/03/2021 10:03 AM    A-G Ratio 1.1 11/03/2021 10:03 AM    ALT (SGPT) 27 11/03/2021 10:03 AM     CrCl cannot be calculated (Patient's most recent lab result is older than the maximum 180 days allowed.). INR History:   (normal INR range 0.8-1.2)     Date   INR   PT   Dose/Comments  05/25/22 1.9  23.2 Metformin started 5/4  04/27/22 2.2  25.9 No changes  03/30/22          2.2                   26.7     No changes  03/02/22          2.1                   25.0     No changes  02/02/22          2.1                   25.4     No changes  01/13/22          1.9                   23.3     No changes  12/03/21          2.1                               7.5 mg daily    A/P:       Anticoagulation:  Considering Mr. Geremias Mejia past history, todays findings, and per Anticoagulation Collaborative Practice Agreement/Protocol:    1. Fingerstick POC INR (1.9) is Subtherapeutic for INR goal today.   2. Change warfarin and increase to take 10 mg Wed; 7.5 mg daily ROW  3. INR therapeutic is 1.9 and subtherapeutic. Patient restarted metformin on , which may reduce the anticoagulant effect of warfarin. Will increase weekly warfarin dose from 52.5 mg to 55 mg (~5%) and patient will take 10 mg Wed; 7.5 mg daily ROW. Will recheck INR in 2 weeks. Patient was instructed to schedule an appointment in 2 week(s) prior to leaving the clinic. Medication reconciliation was completed during the visit. A full discussion of the nature of anticoagulants has been carried out. A full discussion of the need for frequent and regular monitoring, precise dosage adjustment and compliance was stressed. Side effects of potential bleeding were discussed and Mr. Marroquin was instructed to call [phone] if there are any signs of abnormal bleeding. Mr. Marroquin was instructed to avoid any OTC items containing aspirin or ibuprofen and prior to starting any new OTC products to consult with his physician or pharmacist to ensure no drug interactions are present. Mr. Marroquin was instructed to avoid any major changes in his general diet and to avoid alcohol consumption. Mr. Marroquin was provided information in the AVS that includes topics on understanding coumadin therapy, drug interaction considerations, vitamin K and coumadin use, interactions with foods and supplements containing vitamin K, and the use of herbal products. Mr. Marroquin verbalized his understanding of all instructions and will call the office with any questions, concerns, or signs of abnormal bleeding or blood clot. Notifications of recommendations will be sent to Dr. Keya Lambert MD  for review.     Thank you,  Jeffery Gomez, PharmD      For Pharmacy Admin Tracking Only     Intervention Detail: Adherence Monitorin, Dose Adjustment: 1, reason: Therapy Optimization and Lab(s) Ordered   Total # of Interventions Recommended: 3   Total # of Interventions Accepted: 3   Time Spent (min): 20

## 2022-05-17 NOTE — PATIENT INSTRUCTIONS
Today your INR was 1.9. Your goal INR is  2.0-3.0. You have a  5 mg tablet of Coumadin (warfarin). Take Coumadin (warfarin) as follows: Take 10 mg (2 tablets) Wednesdays and 7.5 mg (1.5 tablets) daily rest of week    Come back in 2 week(s) for your next finger stick/INR blood test.        Avoid any over the counter items containing aspirin or ibuprofen, and avoid great swings in general diet. Avoid alcohol consumption. Please notify the INR nurse if you are started on any new medication including over the counter or herbal supplements. Also, please notify your INR nurse if any of your other prescription or over the counter medications have been discontinued. Your Care Instructions  Warfarin is a medicine that you take to prevent blood clots. It is often called a blood thinner. Doctors give warfarin (such as Coumadin) to reduce the risk of blood clots. You may be at risk for blood clots if you have atrial fibrillation or deep vein thrombosis. Some other health problems may also put you at risk. Warfarin slows the amount of time it takes for your blood to clot. It can cause bleeding problems. Even if you've been taking warfarin for a while, it's important to know how to take it safely. Foods and other medicines can affect the way warfarin works. Some can make warfarin work too well. This can cause bleeding problems. And some can make it work poorly, so that it does not prevent blood clots very well. You will need regular blood tests to check how long it takes for your blood to form a clot. This test is called a PT or prothrombin time test. The result of the test is called an INR level. Depending on the test results, your doctor or anticoagulation clinic may adjust your dose of warfarin. Follow-up care is a key part of your treatment and safety. Be sure to make and go to all appointments, and call your doctor if you are having problems.  It's also a good idea to know your test results and keep a list of the medicines you take. How can you care for yourself at home? Take warfarin safely  · Take your warfarin at the same time each day. · If you miss a dose of warfarin, don't take an extra dose to make up for it. Your doctor can tell you exactly what to do so you don't take too much or too little. · Wear medical alert jewelry that lets others know that you take warfarin. You can buy this at most drugstores. · Don't take warfarin if you are pregnant or planning to get pregnant. Talk to your doctor about how you can prevent getting pregnant while you are taking it. · Don't change your dose or stop taking warfarin unless your doctor tells you to. Effects of medicines and food on warfarin  · Don't start or stop taking any medicines, vitamins, or natural remedies unless you first talk to your doctor. Many medicines can affect how warfarin works. These include aspirin and other pain relievers, over-the-counter medicines, multivitamins, dietary supplements, and herbal products. · Tell all of your doctors and pharmacists that you take warfarin. Some prescription medicines can affect how warfarin works. · Keep the amount of vitamin K in your diet about the same from day to day. Do not suddenly eat a lot more or a lot less food that is rich in vitamin K than you usually do. Vitamin K affects how warfarin works and how your blood clots. Talk with your doctor before making big changes in your diet. Vitamin K is in many foods, such as:  ¨ Leafy greens, such as kale, cabbage, spinach, Swiss chard, and lettuce. ¨ Canola and soybean oils. ¨ Green vegetables, such as asparagus, broccoli, and [place] sprouts. ¨ Vegetable drinks, green tea leaves, and some dietary supplement drinks. · Avoid cranberry juice and other cranberry products. They can increase the effects of warfarin. · Limit your use of alcohol. Avoid bleeding by preventing falls and injuries  · Wear slippers or shoes with nonskid soles.   · Remove throw rugs and clutter. · Rearrange furniture and electrical cords to keep them out of walking paths. · Keep stairways, porches, and outside walkways well lit. Use night-lights in hallways and bathrooms. · Be extra careful when you work with sharp tools or knives. When should you call for help? Call 911 anytime you think you may need emergency care. For example, call if:  · You have a sudden, severe headache that is different from past headaches. Call your doctor now or seek immediate medical care if:  · You have any abnormal bleeding, such as:  ¨ Nosebleeds. ¨ Vaginal bleeding that is different (heavier, more frequent, at a different time of the month) than what you are used to. ¨ Bloody or black stools, or rectal bleeding. ¨ Bloody or pink urine. Watch closely for changes in your health, and be sure to contact your doctor if you have any problems. Where can you learn more? Go to http://www.gray.com/. Enter S973 in the search box to learn more about \"Taking Warfarin Safely: Care Instructions. \"  Current as of: January 27, 2016  Content Version: 11.1  © 1153-9703 [employer], Incorporated. Care instructions adapted under license by [employer] (which disclaims liability or warranty for this information). If you have questions about a medical condition or this instruction, always ask your healthcare professional. [place] [address] any warranty or liability for your use of this information.

## 2022-05-25 ENCOUNTER — ANTI-COAG VISIT (OUTPATIENT)
Dept: PHARMACY | Age: 72
End: 2022-05-25
Payer: MEDICARE

## 2022-05-25 VITALS
SYSTOLIC BLOOD PRESSURE: 120 MMHG | DIASTOLIC BLOOD PRESSURE: 80 MMHG | WEIGHT: 239 LBS | BODY MASS INDEX: 34.22 KG/M2 | HEIGHT: 70 IN | HEART RATE: 80 BPM

## 2022-05-25 DIAGNOSIS — I48.0 PAROXYSMAL ATRIAL FIBRILLATION (HCC): Primary | ICD-10-CM

## 2022-05-25 DIAGNOSIS — Z79.01 LONG TERM (CURRENT) USE OF ANTICOAGULANTS: ICD-10-CM

## 2022-05-25 LAB
INR BLD: 1.9
PT POC: 23.2 SECONDS
VALID INTERNAL CONTROL?: YES

## 2022-05-25 PROCEDURE — 99212 OFFICE O/P EST SF 10 MIN: CPT

## 2022-05-25 PROCEDURE — 85610 PROTHROMBIN TIME: CPT

## 2022-06-02 NOTE — PROGRESS NOTES
Pharmacy Progress Note - Anticoagulation Management    S/O:  Mr. Jen Daley  is a 67 y.o. male seen today for anticoagulation management for the diagnosis of Atrial Fibrillation. HPI: At last visit (5/25) INR was 1.9 and subtherapeutic. Patient restarted metformin on 5/4, which may reduce the anticoagulant effect of warfarin. Weekly planned warfarin dose increased from 52.5 mg to 55 mg (~5%) and patient instructed to take 10 mg Wed; 7.5 mg daily ROW. Will recheck INR in 2 weeks. Interim History:    · Warfarin start date: Prior to November 2018 per chart review  · INR Goal:  2.0-3.0    · Current warfarin regimen: 10 mg Wed; 7.5 mg daily ROW                · Warfarin tablet strength:   5 mg   · Duration of therapy: Indefinite     Today's pertinent positives includes:  No significant changes since last visit    Results for orders placed or performed in visit on 06/08/22   POC PROTHROMBIN W/INR   Result Value Ref Range    VALID INTERNAL CONTROL POC Yes     Prothrombin time (POC) 27.9 seconds    INR POC 2.3      · Adherence:   · Able to recall regimen? YES  · Miss/extra dose? NO  · Need refill? NO    Upcoming procedure(s):  NO      Past Medical History:   Diagnosis Date    Diverticulosis     Duodenal ulcer      Allergies   Allergen Reactions    Enalapril Cough    Hydrochlorothiazide Other (comments)     Weakness, fatigue, very sick, extreme weight loss     Current Outpatient Medications   Medication Sig    warfarin (COUMADIN) 5 mg tablet Take 7.5 mg (1.5 tablets) every day or take as directed by clinic provider    metFORMIN (GLUCOPHAGE) 500 mg tablet Take 2 Tablets by mouth two (2) times daily (with meals). Please request future refills from pcp    carvediloL (COREG) 25 mg tablet Take 1 tablet by mouth twice daily    atorvastatin (LIPITOR) 40 mg tablet TAKE ONE TABLET BY MOUTH ONCE NIGHTLY    MAGNESIUM PO Take 400 mg by mouth.  omeprazole (PRILOSEC) 20 mg capsule Take 20 mg by mouth daily.      No current facility-administered medications for this visit. Wt Readings from Last 3 Encounters:   05/25/22 239 lb (108.4 kg)   05/04/22 235 lb (106.6 kg)   04/27/22 237 lb (107.5 kg)     BP Readings from Last 3 Encounters:   05/25/22 120/80   05/04/22 116/80   04/27/22 (!) 135/91     Pulse Readings from Last 3 Encounters:   05/25/22 80   05/04/22 84   04/27/22 87     No results found for: WBC, WBCLT, HGBPOC, HGB, HGBP, HCTPOC, HCT, PHCT, RBCH, PLT, MCV, HGBEXT, HCTEXT, PLTEXT, HGBEXT, HCTEXT, PLTEXT  Lab Results   Component Value Date/Time    Sodium 138 11/03/2021 10:03 AM    Potassium 5.0 11/03/2021 10:03 AM    Chloride 106 11/03/2021 10:03 AM    CO2 28 11/03/2021 10:03 AM    Anion gap 4 (L) 11/03/2021 10:03 AM    Glucose 268 (H) 11/03/2021 10:03 AM    BUN 18 11/03/2021 10:03 AM    Creatinine 0.85 11/03/2021 10:03 AM    BUN/Creatinine ratio 21 (H) 11/03/2021 10:03 AM    GFR est AA >60 11/03/2021 10:03 AM    GFR est non-AA >60 11/03/2021 10:03 AM    Calcium 9.9 11/03/2021 10:03 AM    Bilirubin, total 0.8 11/03/2021 10:03 AM    Alk. phosphatase 75 11/03/2021 10:03 AM    Protein, total 7.4 11/03/2021 10:03 AM    Albumin 3.9 11/03/2021 10:03 AM    Globulin 3.5 11/03/2021 10:03 AM    A-G Ratio 1.1 11/03/2021 10:03 AM    ALT (SGPT) 27 11/03/2021 10:03 AM     CrCl cannot be calculated (Patient's most recent lab result is older than the maximum 180 days allowed.).       INR History:   (normal INR range 0.8-1.2)     Date   INR   PT   Dose/Comments  06/08/22 2.3  27.9 Increased to 10 mg Wed; 7.5 mg daily ROW  05/25/22 1.9  23.2 Metformin started 5/4 04/27/22 2.2  25.9 No changes  03/30/22          2.2                   26.7     No changes  03/02/22          2.1                   25.0     No changes  02/02/22          2.1                   25.4     No changes  01/13/22          1.9                   23.3     No changes  12/03/21          2.1                               7.5 mg daily    A/P: Anticoagulation:  Considering Mr. Tessa Doll past history, todays findings, and per Anticoagulation Collaborative Practice Agreement/Protocol:    1. Fingerstick POC INR (2.3) is Therapeutic for INR goal today. 2. Continue warfarin 10 mg Wed; 7.5 mg daily ROW  3. At last visit (5/25) INR was 1.9 and subtherapeutic for INR goal 2.0-3.0. Weekly warfarin dose was increased from 52.5 mg to 55 mg (~5%) and patient instructed to take 10 mg Wed; 7.5 mg daily ROW. Today INR is 2.3 and therapeutic. Patient denies changes to his medications and reports consistent intake of vitamin K foods. Patient will continue 10 mg Wed; 7.5 mg daily ROW. Will recheck INR in 4 weeks. Patient was instructed to schedule an appointment in 4 week(s) prior to leaving the clinic. Medication reconciliation was completed during the visit. A full discussion of the nature of anticoagulants has been carried out. A full discussion of the need for frequent and regular monitoring, precise dosage adjustment and compliance was stressed. Side effects of potential bleeding were discussed and Mr. Marroquin was instructed to call [phone] if there are any signs of abnormal bleeding. Mr. Marroquin was instructed to avoid any OTC items containing aspirin or ibuprofen and prior to starting any new OTC products to consult with his physician or pharmacist to ensure no drug interactions are present. Mr. Marroquin was instructed to avoid any major changes in his general diet and to avoid alcohol consumption. Mr. Marrouqin was provided information in the AVS that includes topics on understanding coumadin therapy, drug interaction considerations, vitamin K and coumadin use, interactions with foods and supplements containing vitamin K, and the use of herbal products. Mr. Marroquin verbalized his understanding of all instructions and will call the office with any questions, concerns, or signs of abnormal bleeding or blood clot.   Notifications of recommendations will be sent to Dr. Guerita Rizzo MD  for review.     Thank you,  Bret Zimmerman, PharmD      For Pharmacy Admin Tracking Only     Intervention Detail: Adherence Monitorin and Lab(s) Ordered   Total # of Interventions Recommended: 2   Total # of Interventions Accepted: 2   Time Spent (min): 20

## 2022-06-02 NOTE — PATIENT INSTRUCTIONS
Today your INR was 2.3. Your goal INR is  2.0-3.0. You have a  5 mg tablet of Coumadin (warfarin). Take Coumadin (warfarin) as follows: Take 10 mg (2 tablets) Wednesdays and 7.5 mg (1.5 tablets) daily rest of week    Come back in 4 week(s) for your next finger stick/INR blood test.        Avoid any over the counter items containing aspirin or ibuprofen, and avoid great swings in general diet. Avoid alcohol consumption. Please notify the INR nurse if you are started on any new medication including over the counter or herbal supplements. Also, please notify your INR nurse if any of your other prescription or over the counter medications have been discontinued. Your Care Instructions  Warfarin is a medicine that you take to prevent blood clots. It is often called a blood thinner. Doctors give warfarin (such as Coumadin) to reduce the risk of blood clots. You may be at risk for blood clots if you have atrial fibrillation or deep vein thrombosis. Some other health problems may also put you at risk. Warfarin slows the amount of time it takes for your blood to clot. It can cause bleeding problems. Even if you've been taking warfarin for a while, it's important to know how to take it safely. Foods and other medicines can affect the way warfarin works. Some can make warfarin work too well. This can cause bleeding problems. And some can make it work poorly, so that it does not prevent blood clots very well. You will need regular blood tests to check how long it takes for your blood to form a clot. This test is called a PT or prothrombin time test. The result of the test is called an INR level. Depending on the test results, your doctor or anticoagulation clinic may adjust your dose of warfarin. Follow-up care is a key part of your treatment and safety. Be sure to make and go to all appointments, and call your doctor if you are having problems.  It's also a good idea to know your test results and keep a list of the medicines you take. How can you care for yourself at home? Take warfarin safely  · Take your warfarin at the same time each day. · If you miss a dose of warfarin, don't take an extra dose to make up for it. Your doctor can tell you exactly what to do so you don't take too much or too little. · Wear medical alert jewelry that lets others know that you take warfarin. You can buy this at most drugstores. · Don't take warfarin if you are pregnant or planning to get pregnant. Talk to your doctor about how you can prevent getting pregnant while you are taking it. · Don't change your dose or stop taking warfarin unless your doctor tells you to. Effects of medicines and food on warfarin  · Don't start or stop taking any medicines, vitamins, or natural remedies unless you first talk to your doctor. Many medicines can affect how warfarin works. These include aspirin and other pain relievers, over-the-counter medicines, multivitamins, dietary supplements, and herbal products. · Tell all of your doctors and pharmacists that you take warfarin. Some prescription medicines can affect how warfarin works. · Keep the amount of vitamin K in your diet about the same from day to day. Do not suddenly eat a lot more or a lot less food that is rich in vitamin K than you usually do. Vitamin K affects how warfarin works and how your blood clots. Talk with your doctor before making big changes in your diet. Vitamin K is in many foods, such as:  ¨ Leafy greens, such as kale, cabbage, spinach, Swiss chard, and lettuce. ¨ Canola and soybean oils. ¨ Green vegetables, such as asparagus, broccoli, and [place] sprouts. ¨ Vegetable drinks, green tea leaves, and some dietary supplement drinks. · Avoid cranberry juice and other cranberry products. They can increase the effects of warfarin. · Limit your use of alcohol. Avoid bleeding by preventing falls and injuries  · Wear slippers or shoes with nonskid soles.   · Remove throw rugs and clutter. · Rearrange furniture and electrical cords to keep them out of walking paths. · Keep stairways, porches, and outside walkways well lit. Use night-lights in hallways and bathrooms. · Be extra careful when you work with sharp tools or knives. When should you call for help? Call 911 anytime you think you may need emergency care. For example, call if:  · You have a sudden, severe headache that is different from past headaches. Call your doctor now or seek immediate medical care if:  · You have any abnormal bleeding, such as:  ¨ Nosebleeds. ¨ Vaginal bleeding that is different (heavier, more frequent, at a different time of the month) than what you are used to. ¨ Bloody or black stools, or rectal bleeding. ¨ Bloody or pink urine. Watch closely for changes in your health, and be sure to contact your doctor if you have any problems. Where can you learn more? Go to http://www.gray.com/. Enter O715 in the search box to learn more about \"Taking Warfarin Safely: Care Instructions. \"  Current as of: January 27, 2016  Content Version: 11.1  © 4131-9626 [employer], Inc[employer]. Care instructions adapted under license by [employer] (which disclaims liability or warranty for this information). If you have questions about a medical condition or this instruction, always ask your healthcare professional. [place] [address] any warranty or liability for your use of this information.

## 2022-06-08 ENCOUNTER — ANTI-COAG VISIT (OUTPATIENT)
Dept: PHARMACY | Age: 72
End: 2022-06-08
Payer: MEDICARE

## 2022-06-08 VITALS — HEIGHT: 70 IN | BODY MASS INDEX: 34.93 KG/M2 | WEIGHT: 244 LBS

## 2022-06-08 DIAGNOSIS — I48.0 PAROXYSMAL ATRIAL FIBRILLATION (HCC): Primary | ICD-10-CM

## 2022-06-08 DIAGNOSIS — Z79.01 LONG TERM (CURRENT) USE OF ANTICOAGULANTS: ICD-10-CM

## 2022-06-08 LAB
INR BLD: 2.3
PT POC: 27.9 SECONDS
VALID INTERNAL CONTROL?: YES

## 2022-06-08 PROCEDURE — 85610 PROTHROMBIN TIME: CPT

## 2022-06-08 PROCEDURE — 99211 OFF/OP EST MAY X REQ PHY/QHP: CPT

## 2022-06-27 NOTE — PROGRESS NOTES
Pharmacy Progress Note - Anticoagulation Management    S/O:  Mr. Rocky Hurt  is a 67 y.o. male seen today for anticoagulation management for the diagnosis of Atrial Fibrillation. HPI: At last visit (6/8) INR was 2.3 and therapeutic for INR goal 2.0-3.0. Patient instructed to continue current regimen and recheck INR in 4 weeks. Interim History:    · Warfarin start date: Prior to November 2018 per chart review  · INR Goal:  2.0-3.0    · Current warfarin regimen: 10 mg Wed; 7.5 mg daily ROW                · Warfarin tablet strength:   5 mg   · Duration of therapy: Indefinite     Today's pertinent positives includes:  No significant changes since last visit    Results for orders placed or performed in visit on 07/06/22   POC PROTHROMBIN W/INR   Result Value Ref Range    VALID INTERNAL CONTROL POC Yes     Prothrombin time (POC) 32.3 seconds    INR POC 2.7      · Adherence:   · Able to recall regimen? YES  · Miss/extra dose? NO  · Need refill? NO    Upcoming procedure(s):  NO      Past Medical History:   Diagnosis Date    Diverticulosis     Duodenal ulcer      Allergies   Allergen Reactions    Enalapril Cough    Hydrochlorothiazide Other (comments)     Weakness, fatigue, very sick, extreme weight loss     Current Outpatient Medications   Medication Sig    warfarin (COUMADIN) 5 mg tablet Take 7.5 mg (1.5 tablets) every day or take as directed by clinic provider    metFORMIN (GLUCOPHAGE) 500 mg tablet Take 2 Tablets by mouth two (2) times daily (with meals). Please request future refills from pcp    carvediloL (COREG) 25 mg tablet Take 1 tablet by mouth twice daily    atorvastatin (LIPITOR) 40 mg tablet TAKE ONE TABLET BY MOUTH ONCE NIGHTLY    MAGNESIUM PO Take 400 mg by mouth.  omeprazole (PRILOSEC) 20 mg capsule Take 20 mg by mouth daily. No current facility-administered medications for this visit.      Wt Readings from Last 3 Encounters:   06/08/22 244 lb (110.7 kg)   05/25/22 239 lb (108.4 kg) 05/04/22 235 lb (106.6 kg)     BP Readings from Last 3 Encounters:   05/25/22 120/80   05/04/22 116/80   04/27/22 (!) 135/91     Pulse Readings from Last 3 Encounters:   05/25/22 80   05/04/22 84   04/27/22 87     No results found for: WBC, WBCLT, HGBPOC, HGB, HGBP, HCTPOC, HCT, PHCT, RBCH, PLT, MCV, HGBEXT, HCTEXT, PLTEXT, HGBEXT, HCTEXT, PLTEXT  Lab Results   Component Value Date/Time    Sodium 138 11/03/2021 10:03 AM    Potassium 5.0 11/03/2021 10:03 AM    Chloride 106 11/03/2021 10:03 AM    CO2 28 11/03/2021 10:03 AM    Anion gap 4 (L) 11/03/2021 10:03 AM    Glucose 268 (H) 11/03/2021 10:03 AM    BUN 18 11/03/2021 10:03 AM    Creatinine 0.85 11/03/2021 10:03 AM    BUN/Creatinine ratio 21 (H) 11/03/2021 10:03 AM    GFR est AA >60 11/03/2021 10:03 AM    GFR est non-AA >60 11/03/2021 10:03 AM    Calcium 9.9 11/03/2021 10:03 AM    Bilirubin, total 0.8 11/03/2021 10:03 AM    Alk. phosphatase 75 11/03/2021 10:03 AM    Protein, total 7.4 11/03/2021 10:03 AM    Albumin 3.9 11/03/2021 10:03 AM    Globulin 3.5 11/03/2021 10:03 AM    A-G Ratio 1.1 11/03/2021 10:03 AM    ALT (SGPT) 27 11/03/2021 10:03 AM     CrCl cannot be calculated (Patient's most recent lab result is older than the maximum 180 days allowed.).       INR History:   (normal INR range 0.8-1.2)     Date   INR   PT   Dose/Comments  07/06/22 2.7  32.3 No changes  06/08/22 2.3  27.9 Increased to 10 mg Wed; 7.5 mg daily ROW  05/25/22 1.9  23.2 Metformin started 5/4 04/27/22 2.2  25.9 No changes  03/30/22          2.2                   26.7     No changes  03/02/22          2.1                   25.0     No changes  02/02/22          2.1                   25.4     No changes  01/13/22          1.9                   23.3     No changes  12/03/21          2.1                               7.5 mg daily    A/P:       Anticoagulation:  Considering Mr. Leonardo Travis past history, todays findings, and per Anticoagulation Collaborative Practice Agreement/Protocol:    1. Fingerstick POC INR (2.7) is Therapeutic for INR goal today. 2. Continue warfarin 10 mg Wed; 7.5 mg daily ROW  3. INR is 2.7 and therapeutic for INR goal 2.0-3.0. Patient denies changes to his other medications and reports consistent intake of vitamin K foods. Patient will continue 10 mg Wed; 7.5 mg daily ROW. Will recheck INR in 4 weeks. Patient was instructed to schedule an appointment in 4 week(s) prior to leaving the clinic. Medication reconciliation was completed during the visit. A full discussion of the nature of anticoagulants has been carried out. A full discussion of the need for frequent and regular monitoring, precise dosage adjustment and compliance was stressed. Side effects of potential bleeding were discussed and Mr. Marroquin was instructed to call [phone] if there are any signs of abnormal bleeding. Mr. Marroquin was instructed to avoid any OTC items containing aspirin or ibuprofen and prior to starting any new OTC products to consult with his physician or pharmacist to ensure no drug interactions are present. Mr. Marroquin was instructed to avoid any major changes in his general diet and to avoid alcohol consumption. Mr. Marroquin was provided information in the AVS that includes topics on understanding coumadin therapy, drug interaction considerations, vitamin K and coumadin use, interactions with foods and supplements containing vitamin K, and the use of herbal products. Mr. Marroquin verbalized his understanding of all instructions and will call the office with any questions, concerns, or signs of abnormal bleeding or blood clot. Notifications of recommendations will be sent to Dr. Jess Fitzgerald MD  for review.     Thank you,  Tessa ChuaD      For Pharmacy Admin Tracking Only     Intervention Detail: Adherence Monitorin and Lab(s) Ordered   Total # of Interventions Recommended: 2   Total # of Interventions Accepted: 2   Time Spent (min): 20

## 2022-06-27 NOTE — PATIENT INSTRUCTIONS
Today your INR was 2.7. Your goal INR is  2.0-3.0. You have a  5 mg tablet of Coumadin (warfarin). Take Coumadin (warfarin) as follows: Take 10 mg (2 tablets) Wednesdays and 7.5 mg (1.5 tablets) daily rest of week    Come back in 4 week(s) for your next finger stick/INR blood test.        Avoid any over the counter items containing aspirin or ibuprofen, and avoid great swings in general diet. Avoid alcohol consumption. Please notify the INR nurse if you are started on any new medication including over the counter or herbal supplements. Also, please notify your INR nurse if any of your other prescription or over the counter medications have been discontinued. Your Care Instructions  Warfarin is a medicine that you take to prevent blood clots. It is often called a blood thinner. Doctors give warfarin (such as Coumadin) to reduce the risk of blood clots. You may be at risk for blood clots if you have atrial fibrillation or deep vein thrombosis. Some other health problems may also put you at risk. Warfarin slows the amount of time it takes for your blood to clot. It can cause bleeding problems. Even if you've been taking warfarin for a while, it's important to know how to take it safely. Foods and other medicines can affect the way warfarin works. Some can make warfarin work too well. This can cause bleeding problems. And some can make it work poorly, so that it does not prevent blood clots very well. You will need regular blood tests to check how long it takes for your blood to form a clot. This test is called a PT or prothrombin time test. The result of the test is called an INR level. Depending on the test results, your doctor or anticoagulation clinic may adjust your dose of warfarin. Follow-up care is a key part of your treatment and safety. Be sure to make and go to all appointments, and call your doctor if you are having problems.  It's also a good idea to know your test results and keep a list of the medicines you take. How can you care for yourself at home? Take warfarin safely  · Take your warfarin at the same time each day. · If you miss a dose of warfarin, don't take an extra dose to make up for it. Your doctor can tell you exactly what to do so you don't take too much or too little. · Wear medical alert jewelry that lets others know that you take warfarin. You can buy this at most drugstores. · Don't take warfarin if you are pregnant or planning to get pregnant. Talk to your doctor about how you can prevent getting pregnant while you are taking it. · Don't change your dose or stop taking warfarin unless your doctor tells you to. Effects of medicines and food on warfarin  · Don't start or stop taking any medicines, vitamins, or natural remedies unless you first talk to your doctor. Many medicines can affect how warfarin works. These include aspirin and other pain relievers, over-the-counter medicines, multivitamins, dietary supplements, and herbal products. · Tell all of your doctors and pharmacists that you take warfarin. Some prescription medicines can affect how warfarin works. · Keep the amount of vitamin K in your diet about the same from day to day. Do not suddenly eat a lot more or a lot less food that is rich in vitamin K than you usually do. Vitamin K affects how warfarin works and how your blood clots. Talk with your doctor before making big changes in your diet. Vitamin K is in many foods, such as:  ¨ Leafy greens, such as kale, cabbage, spinach, Swiss chard, and lettuce. ¨ Canola and soybean oils. ¨ Green vegetables, such as asparagus, broccoli, and [place] sprouts. ¨ Vegetable drinks, green tea leaves, and some dietary supplement drinks. · Avoid cranberry juice and other cranberry products. They can increase the effects of warfarin. · Limit your use of alcohol. Avoid bleeding by preventing falls and injuries  · Wear slippers or shoes with nonskid soles.   · Remove throw rugs and clutter. · Rearrange furniture and electrical cords to keep them out of walking paths. · Keep stairways, porches, and outside walkways well lit. Use night-lights in hallways and bathrooms. · Be extra careful when you work with sharp tools or knives. When should you call for help? Call 911 anytime you think you may need emergency care. For example, call if:  · You have a sudden, severe headache that is different from past headaches. Call your doctor now or seek immediate medical care if:  · You have any abnormal bleeding, such as:  ¨ Nosebleeds. ¨ Vaginal bleeding that is different (heavier, more frequent, at a different time of the month) than what you are used to. ¨ Bloody or black stools, or rectal bleeding. ¨ Bloody or pink urine. Watch closely for changes in your health, and be sure to contact your doctor if you have any problems. Where can you learn more? Go to http://www.gray.com/. Enter D365 in the search box to learn more about \"Taking Warfarin Safely: Care Instructions. \"  Current as of: January 27, 2016  Content Version: 11.1  © 7059-2611 [employer]. Care instructions adapted under license by [employer] (which disclaims liability or warranty for this information). If you have questions about a medical condition or this instruction, always ask your healthcare professional. [place] [address] any warranty or liability for your use of this information.

## 2022-07-06 ENCOUNTER — ANTI-COAG VISIT (OUTPATIENT)
Dept: PHARMACY | Age: 72
End: 2022-07-06

## 2022-07-06 VITALS — WEIGHT: 242 LBS | BODY MASS INDEX: 34.65 KG/M2 | HEIGHT: 70 IN

## 2022-07-06 DIAGNOSIS — Z79.01 LONG TERM (CURRENT) USE OF ANTICOAGULANTS: ICD-10-CM

## 2022-07-06 DIAGNOSIS — I48.0 PAROXYSMAL ATRIAL FIBRILLATION (HCC): Primary | ICD-10-CM

## 2022-07-06 LAB
INR BLD: 2.7
PT POC: 32.3 SECONDS
VALID INTERNAL CONTROL?: YES

## 2022-07-27 NOTE — PROGRESS NOTES
Pharmacy Progress Note - Anticoagulation Management    S/O:  Mr. Lisseth Munoz  is a 67 y.o. male seen today for anticoagulation management for the diagnosis of Atrial Fibrillation. HPI: At last visit (7/6) INR was 2.7 and therapeutic for INR goal 2.0-3.0. Patient instructed to continue current regimen and recheck INR in 4 weeks. Interim History:    Warfarin start date: Prior to November 2018 per chart review  INR Goal:  2.0-3.0    Current warfarin regimen: 10 mg Wed; 7.5 mg daily ROW                Warfarin tablet strength:   5 mg   Duration of therapy: Indefinite     Today's pertinent positives includes:  No significant changes since last visit    Results for orders placed or performed in visit on 08/03/22   AMB POC PT/INR   Result Value Ref Range    VALID INTERNAL CONTROL POC Yes     Prothrombin time (POC) 22.9 seconds    INR POC 1.9      Adherence:   Able to recall regimen? YES  Miss/extra dose? NO  Need refill? NO    Upcoming procedure(s):  NO      Past Medical History:   Diagnosis Date    Diverticulosis     Duodenal ulcer      Allergies   Allergen Reactions    Enalapril Cough    Hydrochlorothiazide Other (comments)     Weakness, fatigue, very sick, extreme weight loss     Current Outpatient Medications   Medication Sig    warfarin (COUMADIN) 5 mg tablet Take 7.5 mg (1.5 tablets) every day or take as directed by clinic provider    metFORMIN (GLUCOPHAGE) 500 mg tablet Take 2 Tablets by mouth two (2) times daily (with meals). Please request future refills from pcp    carvediloL (COREG) 25 mg tablet Take 1 tablet by mouth twice daily    atorvastatin (LIPITOR) 40 mg tablet TAKE ONE TABLET BY MOUTH ONCE NIGHTLY    MAGNESIUM PO Take 400 mg by mouth. omeprazole (PRILOSEC) 20 mg capsule Take 20 mg by mouth daily. No current facility-administered medications for this visit.      Wt Readings from Last 3 Encounters:   07/06/22 242 lb (109.8 kg)   06/08/22 244 lb (110.7 kg)   05/25/22 239 lb (108.4 kg)     BP Readings from Last 3 Encounters:   05/25/22 120/80   05/04/22 116/80   04/27/22 (!) 135/91     Pulse Readings from Last 3 Encounters:   05/25/22 80   05/04/22 84   04/27/22 87     No results found for: WBC, WBCLT, HGBPOC, HGB, HGBP, HCTPOC, HCT, PHCT, RBCH, PLT, MCV, HGBEXT, HCTEXT, PLTEXT, HGBEXT, HCTEXT, PLTEXT  Lab Results   Component Value Date/Time    Sodium 138 11/03/2021 10:03 AM    Potassium 5.0 11/03/2021 10:03 AM    Chloride 106 11/03/2021 10:03 AM    CO2 28 11/03/2021 10:03 AM    Anion gap 4 (L) 11/03/2021 10:03 AM    Glucose 268 (H) 11/03/2021 10:03 AM    BUN 18 11/03/2021 10:03 AM    Creatinine 0.85 11/03/2021 10:03 AM    BUN/Creatinine ratio 21 (H) 11/03/2021 10:03 AM    GFR est AA >60 11/03/2021 10:03 AM    GFR est non-AA >60 11/03/2021 10:03 AM    Calcium 9.9 11/03/2021 10:03 AM    Bilirubin, total 0.8 11/03/2021 10:03 AM    Alk. phosphatase 75 11/03/2021 10:03 AM    Protein, total 7.4 11/03/2021 10:03 AM    Albumin 3.9 11/03/2021 10:03 AM    Globulin 3.5 11/03/2021 10:03 AM    A-G Ratio 1.1 11/03/2021 10:03 AM    ALT (SGPT) 27 11/03/2021 10:03 AM     CrCl cannot be calculated (Patient's most recent lab result is older than the maximum 180 days allowed.).       INR History:   (normal INR range 0.8-1.2)     Date   INR   PT   Dose/Comments  08/03/22 1.9  22.9 No changes  07/06/22 2.7  32.3 No changes  06/08/22 2.3  27.9 Increased to 10 mg Wed; 7.5 mg daily ROW  05/25/22 1.9  23.2 Metformin started 5/4  04/27/22 2.2  25.9 No changes  03/30/22          2.2                   26.7     No changes  03/02/22          2.1                   25.0     No changes  02/02/22          2.1                   25.4     No changes  01/13/22          1.9                   23.3     No changes  12/03/21          2.1                               7.5 mg daily    A/P:       Anticoagulation:  Considering  Linhleighjeff Christoph past history, todays findings, and per Anticoagulation Collaborative Practice Agreement/Protocol:    Fingerstick POC INR (1.9) is Subtherapeutic for INR goal today. Continue warfarin 10 mg Wed; 7.5 mg daily ROW  INR is 1.9 and slightly subtherapeutic for INR goal 2.0-3.0. Patient denies missed doses of warfarin or changes to his other medications. INR previously therapeutic on this weekly dose so patient instructed to continue 10 mg Wed; 7.5 mg daily ROW and recheck INR in 2 weeks. Patient declines to return to clinic prior to 4 weeks. Follow up appointment scheduled . Patient was instructed to schedule an appointment in 2 week(s) prior to leaving the clinic. Medication reconciliation was completed during the visit. A full discussion of the nature of anticoagulants has been carried out. A full discussion of the need for frequent and regular monitoring, precise dosage adjustment and compliance was stressed. Side effects of potential bleeding were discussed and Mr. Marroquin was instructed to call [phone] if there are any signs of abnormal bleeding. Mr. Marroquin was instructed to avoid any OTC items containing aspirin or ibuprofen and prior to starting any new OTC products to consult with his physician or pharmacist to ensure no drug interactions are present. Mr. Marroquin was instructed to avoid any major changes in his general diet and to avoid alcohol consumption. Mr. Marroquin was provided information in the AVS that includes topics on understanding coumadin therapy, drug interaction considerations, vitamin K and coumadin use, interactions with foods and supplements containing vitamin K, and the use of herbal products. Mr. Marroquin verbalized his understanding of all instructions and will call the office with any questions, concerns, or signs of abnormal bleeding or blood clot. Notifications of recommendations will be sent to Dr. Raphael Martinez MD  for review.     Thank you,  Aisha Do, PharmD      For Pharmacy Admin Tracking Only    Intervention Detail: Adherence Monitorin and Lab(s) Ordered  Total # of Interventions Recommended: 2  Total # of Interventions Accepted: 2  Time Spent (min): 20

## 2022-07-27 NOTE — PATIENT INSTRUCTIONS
Today your INR was 1.9. Your goal INR is  2.0-3.0. You have a  5 mg tablet of Coumadin (warfarin). Take Coumadin (warfarin) as follows: Take 10 mg (2 tablets) Wednesdays and 7.5 mg (1.5 tablets) daily rest of week    Come back in 2 week(s) for your next finger stick/INR blood test.        Avoid any over the counter items containing aspirin or ibuprofen, and avoid great swings in general diet. Avoid alcohol consumption. Please notify the INR nurse if you are started on any new medication including over the counter or herbal supplements. Also, please notify your INR nurse if any of your other prescription or over the counter medications have been discontinued. Your Care Instructions  Warfarin is a medicine that you take to prevent blood clots. It is often called a blood thinner. Doctors give warfarin (such as Coumadin) to reduce the risk of blood clots. You may be at risk for blood clots if you have atrial fibrillation or deep vein thrombosis. Some other health problems may also put you at risk. Warfarin slows the amount of time it takes for your blood to clot. It can cause bleeding problems. Even if you've been taking warfarin for a while, it's important to know how to take it safely. Foods and other medicines can affect the way warfarin works. Some can make warfarin work too well. This can cause bleeding problems. And some can make it work poorly, so that it does not prevent blood clots very well. You will need regular blood tests to check how long it takes for your blood to form a clot. This test is called a PT or prothrombin time test. The result of the test is called an INR level. Depending on the test results, your doctor or anticoagulation clinic may adjust your dose of warfarin. Follow-up care is a key part of your treatment and safety. Be sure to make and go to all appointments, and call your doctor if you are having problems.  It's also a good idea to know your test results and keep a list of the medicines you take. How can you care for yourself at home? Take warfarin safely  Take your warfarin at the same time each day. If you miss a dose of warfarin, don't take an extra dose to make up for it. Your doctor can tell you exactly what to do so you don't take too much or too little. Wear medical alert jewelry that lets others know that you take warfarin. You can buy this at most drugstores. Don't take warfarin if you are pregnant or planning to get pregnant. Talk to your doctor about how you can prevent getting pregnant while you are taking it. Don't change your dose or stop taking warfarin unless your doctor tells you to. Effects of medicines and food on warfarin  Don't start or stop taking any medicines, vitamins, or natural remedies unless you first talk to your doctor. Many medicines can affect how warfarin works. These include aspirin and other pain relievers, over-the-counter medicines, multivitamins, dietary supplements, and herbal products. Tell all of your doctors and pharmacists that you take warfarin. Some prescription medicines can affect how warfarin works. Keep the amount of vitamin K in your diet about the same from day to day. Do not suddenly eat a lot more or a lot less food that is rich in vitamin K than you usually do. Vitamin K affects how warfarin works and how your blood clots. Talk with your doctor before making big changes in your diet. Vitamin K is in many foods, such as:  Leafy greens, such as kale, cabbage, spinach, Swiss chard, and lettuce. Canola and soybean oils. Green vegetables, such as asparagus, broccoli, and [place] sprouts. Vegetable drinks, green tea leaves, and some dietary supplement drinks. Avoid cranberry juice and other cranberry products. They can increase the effects of warfarin. Limit your use of alcohol. Avoid bleeding by preventing falls and injuries  Wear slippers or shoes with nonskid soles.   Remove throw rugs and clutter. Rearrange furniture and electrical cords to keep them out of walking paths. Keep stairways, porches, and outside walkways well lit. Use night-lights in hallways and bathrooms. Be extra careful when you work with sharp tools or knives. When should you call for help? Call 911 anytime you think you may need emergency care. For example, call if:  You have a sudden, severe headache that is different from past headaches. Call your doctor now or seek immediate medical care if:  You have any abnormal bleeding, such as:  Nosebleeds. Vaginal bleeding that is different (heavier, more frequent, at a different time of the month) than what you are used to. Bloody or black stools, or rectal bleeding. Bloody or pink urine. Watch closely for changes in your health, and be sure to contact your doctor if you have any problems. Where can you learn more? Go to http://www.gray.com/. Enter G273 in the search box to learn more about \"Taking Warfarin Safely: Care Instructions. \"  Current as of: January 27, 2016  Content Version: 11.1  © 7141-6120 [employer]. Care instructions adapted under license by [employer] (which disclaims liability or warranty for this information). If you have questions about a medical condition or this instruction, always ask your healthcare professional. [place] [address] any warranty or liability for your use of this information.

## 2022-08-03 ENCOUNTER — ANTI-COAG VISIT (OUTPATIENT)
Dept: PHARMACY | Age: 72
End: 2022-08-03
Payer: MEDICARE

## 2022-08-03 DIAGNOSIS — Z79.01 LONG TERM (CURRENT) USE OF ANTICOAGULANTS: ICD-10-CM

## 2022-08-03 DIAGNOSIS — I48.0 PAROXYSMAL ATRIAL FIBRILLATION (HCC): Primary | ICD-10-CM

## 2022-08-03 LAB
INR BLD: 1.9
PT POC: 22.9 SECONDS
VALID INTERNAL CONTROL?: YES

## 2022-08-03 PROCEDURE — 99211 OFF/OP EST MAY X REQ PHY/QHP: CPT

## 2022-08-25 NOTE — PATIENT INSTRUCTIONS
Today your INR was 2.6. Your goal INR is  2.0-3.0. You have a  5 mg tablet of Coumadin (warfarin). Take Coumadin (warfarin) as follows: Take 10 mg (2 tablets) Wednesdays and 7.5 mg (1.5 tablets) daily rest of week    Come back in 4 week(s) for your next finger stick/INR blood test.        Avoid any over the counter items containing aspirin or ibuprofen, and avoid great swings in general diet. Avoid alcohol consumption. Please notify the INR nurse if you are started on any new medication including over the counter or herbal supplements. Also, please notify your INR nurse if any of your other prescription or over the counter medications have been discontinued. Your Care Instructions  Warfarin is a medicine that you take to prevent blood clots. It is often called a blood thinner. Doctors give warfarin (such as Coumadin) to reduce the risk of blood clots. You may be at risk for blood clots if you have atrial fibrillation or deep vein thrombosis. Some other health problems may also put you at risk. Warfarin slows the amount of time it takes for your blood to clot. It can cause bleeding problems. Even if you've been taking warfarin for a while, it's important to know how to take it safely. Foods and other medicines can affect the way warfarin works. Some can make warfarin work too well. This can cause bleeding problems. And some can make it work poorly, so that it does not prevent blood clots very well. You will need regular blood tests to check how long it takes for your blood to form a clot. This test is called a PT or prothrombin time test. The result of the test is called an INR level. Depending on the test results, your doctor or anticoagulation clinic may adjust your dose of warfarin. Follow-up care is a key part of your treatment and safety. Be sure to make and go to all appointments, and call your doctor if you are having problems.  It's also a good idea to know your test results and keep a list of the medicines you take. How can you care for yourself at home? Take warfarin safely  Take your warfarin at the same time each day. If you miss a dose of warfarin, don't take an extra dose to make up for it. Your doctor can tell you exactly what to do so you don't take too much or too little. Wear medical alert jewelry that lets others know that you take warfarin. You can buy this at most drugstores. Don't take warfarin if you are pregnant or planning to get pregnant. Talk to your doctor about how you can prevent getting pregnant while you are taking it. Don't change your dose or stop taking warfarin unless your doctor tells you to. Effects of medicines and food on warfarin  Don't start or stop taking any medicines, vitamins, or natural remedies unless you first talk to your doctor. Many medicines can affect how warfarin works. These include aspirin and other pain relievers, over-the-counter medicines, multivitamins, dietary supplements, and herbal products. Tell all of your doctors and pharmacists that you take warfarin. Some prescription medicines can affect how warfarin works. Keep the amount of vitamin K in your diet about the same from day to day. Do not suddenly eat a lot more or a lot less food that is rich in vitamin K than you usually do. Vitamin K affects how warfarin works and how your blood clots. Talk with your doctor before making big changes in your diet. Vitamin K is in many foods, such as:  Leafy greens, such as kale, cabbage, spinach, Swiss chard, and lettuce. Canola and soybean oils. Green vegetables, such as asparagus, broccoli, and [place] sprouts. Vegetable drinks, green tea leaves, and some dietary supplement drinks. Avoid cranberry juice and other cranberry products. They can increase the effects of warfarin. Limit your use of alcohol. Avoid bleeding by preventing falls and injuries  Wear slippers or shoes with nonskid soles.   Remove throw rugs and clutter. Rearrange furniture and electrical cords to keep them out of walking paths. Keep stairways, porches, and outside walkways well lit. Use night-lights in hallways and bathrooms. Be extra careful when you work with sharp tools or knives. When should you call for help? Call 911 anytime you think you may need emergency care. For example, call if:  You have a sudden, severe headache that is different from past headaches. Call your doctor now or seek immediate medical care if:  You have any abnormal bleeding, such as:  Nosebleeds. Vaginal bleeding that is different (heavier, more frequent, at a different time of the month) than what you are used to. Bloody or black stools, or rectal bleeding. Bloody or pink urine. Watch closely for changes in your health, and be sure to contact your doctor if you have any problems. Where can you learn more? Go to http://www.gray.com/. Enter E927 in the search box to learn more about \"Taking Warfarin Safely: Care Instructions. \"  Current as of: January 27, 2016  Content Version: 11.1  © 2349-3773 [employer]. Care instructions adapted under license by [employer] (which disclaims liability or warranty for this information). If you have questions about a medical condition or this instruction, always ask your healthcare professional. [place] [address] any warranty or liability for your use of this information.

## 2022-08-25 NOTE — PROGRESS NOTES
Pharmacy Progress Note - Anticoagulation Management    S/O:  Mr. Isaiah Rivero  is a 67 y.o. male seen today for anticoagulation management for the diagnosis of Atrial Fibrillation. HPI: At last visit (8/3) INR was 1.9 and slightly subtherapeutic for INR goal 2.0-3.0. INR previously therapeutic on this weekly dose so patient was instructed to continue 10 mg Wed; 7.5 mg daily ROW and recheck INR in 2 weeks. Patient declined to return to clinic prior to 4 weeks. Follow up appointment scheduled 8/31. Interim History:    Warfarin start date: Prior to November 2018 per chart review  INR Goal:  2.0-3.0    Current warfarin regimen: 10 mg Wed; 7.5 mg daily ROW                Warfarin tablet strength:   5 mg   Duration of therapy: Indefinite     Today's pertinent positives includes:  No significant changes since last visit    Results for orders placed or performed in visit on 08/31/22   AMB POC PT/INR   Result Value Ref Range    VALID INTERNAL CONTROL POC Yes     Prothrombin time (POC) 31.3 seconds    INR POC 2.6      Adherence:   Able to recall regimen? YES  Miss/extra dose? NO  Need refill? NO    Upcoming procedure(s):  NO      Past Medical History:   Diagnosis Date    Diverticulosis     Duodenal ulcer      Allergies   Allergen Reactions    Enalapril Cough    Hydrochlorothiazide Other (comments)     Weakness, fatigue, very sick, extreme weight loss     Current Outpatient Medications   Medication Sig    warfarin (COUMADIN) 5 mg tablet Take 10 mg (2 tablets) Wednesdays and 7.5 mg (1.5 tablets) daily rest of week or take as directed by clinic provider    metFORMIN (GLUCOPHAGE) 500 mg tablet Take 2 Tablets by mouth two (2) times daily (with meals). Please request future refills from pcp    carvediloL (COREG) 25 mg tablet Take 1 tablet by mouth twice daily    atorvastatin (LIPITOR) 40 mg tablet TAKE ONE TABLET BY MOUTH ONCE NIGHTLY    MAGNESIUM PO Take 400 mg by mouth.     omeprazole (PRILOSEC) 20 mg capsule Take 20 mg by mouth daily. No current facility-administered medications for this visit. Wt Readings from Last 3 Encounters:   07/06/22 242 lb (109.8 kg)   06/08/22 244 lb (110.7 kg)   05/25/22 239 lb (108.4 kg)     BP Readings from Last 3 Encounters:   05/25/22 120/80   05/04/22 116/80   04/27/22 (!) 135/91     Pulse Readings from Last 3 Encounters:   05/25/22 80   05/04/22 84   04/27/22 87     No results found for: WBC, WBCLT, HGBPOC, HGB, HGBP, HCTPOC, HCT, PHCT, RBCH, PLT, MCV, HGBEXT, HCTEXT, PLTEXT, HGBEXT, HCTEXT, PLTEXT  Lab Results   Component Value Date/Time    Sodium 138 11/03/2021 10:03 AM    Potassium 5.0 11/03/2021 10:03 AM    Chloride 106 11/03/2021 10:03 AM    CO2 28 11/03/2021 10:03 AM    Anion gap 4 (L) 11/03/2021 10:03 AM    Glucose 268 (H) 11/03/2021 10:03 AM    BUN 18 11/03/2021 10:03 AM    Creatinine 0.85 11/03/2021 10:03 AM    BUN/Creatinine ratio 21 (H) 11/03/2021 10:03 AM    GFR est AA >60 11/03/2021 10:03 AM    GFR est non-AA >60 11/03/2021 10:03 AM    Calcium 9.9 11/03/2021 10:03 AM    Bilirubin, total 0.8 11/03/2021 10:03 AM    Alk. phosphatase 75 11/03/2021 10:03 AM    Protein, total 7.4 11/03/2021 10:03 AM    Albumin 3.9 11/03/2021 10:03 AM    Globulin 3.5 11/03/2021 10:03 AM    A-G Ratio 1.1 11/03/2021 10:03 AM    ALT (SGPT) 27 11/03/2021 10:03 AM     CrCl cannot be calculated (Patient's most recent lab result is older than the maximum 180 days allowed.).       INR History:   (normal INR range 0.8-1.2)     Date   INR   PT   Dose/Comments  08/31/22 2.6  31.3 No changes  08/03/22 1.9  22.9 No changes  07/06/22 2.7  32.3 No changes  06/08/22 2.3  27.9 Increased to 10 mg Wed; 7.5 mg daily ROW  05/25/22 1.9  23.2 Metformin started 5/4 04/27/22 2.2  25.9 No changes  03/30/22          2.2                   26.7     No changes  03/02/22          2.1                   25.0     No changes  02/02/22          2.1                   25.4     No changes  01/13/22          1.9                   23.3 No changes  12/03/21          2.1                               7.5 mg daily    A/P:       Anticoagulation:  Considering Mr. Chantal Powers past history, todays findings, and per Anticoagulation Collaborative Practice Agreement/Protocol:    Fingerstick POC INR (2.6) is Therapeutic for INR goal today. Continue warfarin 10 mg Wed; 7.5 mg daily ROW  At last visit (8/3) INR was 1.9 and slightly subtherapeutic for INR goal 2.0-3.0. INR previously therapeutic on this weekly dose so patient was instructed to continue 10 mg Wed; 7.5 mg daily ROW and recheck INR in 2 weeks. Patient declined to return to clinic prior to 4 weeks. Today INR is 2.6 and therapeutic. Patient denies changes to his medications and reports consistent intake of vitamin K foods. Patient will continue 10 mg Wed; 7.5 mg daily ROW and recheck INR in 4 weeks. Patient was instructed to schedule an appointment in 4 week(s) prior to leaving the clinic. Medication reconciliation was completed during the visit. A full discussion of the nature of anticoagulants has been carried out. A full discussion of the need for frequent and regular monitoring, precise dosage adjustment and compliance was stressed. Side effects of potential bleeding were discussed and Mr. Marroquin was instructed to call [phone] if there are any signs of abnormal bleeding. Mr. Marroquin was instructed to avoid any OTC items containing aspirin or ibuprofen and prior to starting any new OTC products to consult with his physician or pharmacist to ensure no drug interactions are present. Mr. Marroquin was instructed to avoid any major changes in his general diet and to avoid alcohol consumption. Mr. Marroquin was provided information in the AVS that includes topics on understanding coumadin therapy, drug interaction considerations, vitamin K and coumadin use, interactions with foods and supplements containing vitamin K, and the use of herbal products.     Mr. Marroquin verbalized his understanding of all instructions and will call the office with any questions, concerns, or signs of abnormal bleeding or blood clot. Notifications of recommendations will be sent to Dr. Liza Pressley MD  for review.     Thank you,  Jeffrey Morrow, PharmD      For Pharmacy Admin Tracking Only    Intervention Detail: Adherence Monitorin and Lab(s) Ordered  Total # of Interventions Recommended: 2  Total # of Interventions Accepted: 2  Time Spent (min): 20

## 2022-08-31 ENCOUNTER — ANTI-COAG VISIT (OUTPATIENT)
Dept: PHARMACY | Age: 72
End: 2022-08-31
Payer: MEDICARE

## 2022-08-31 DIAGNOSIS — Z79.01 LONG TERM (CURRENT) USE OF ANTICOAGULANTS: ICD-10-CM

## 2022-08-31 DIAGNOSIS — I48.0 PAROXYSMAL ATRIAL FIBRILLATION (HCC): Primary | ICD-10-CM

## 2022-08-31 LAB
INR BLD: 2.6
PT POC: 31.3 SECONDS
VALID INTERNAL CONTROL?: YES

## 2022-08-31 PROCEDURE — 99211 OFF/OP EST MAY X REQ PHY/QHP: CPT

## 2022-09-18 DIAGNOSIS — R00.2 PALPITATION: ICD-10-CM

## 2022-09-19 RX ORDER — ATORVASTATIN CALCIUM 40 MG/1
TABLET, FILM COATED ORAL
Qty: 90 TABLET | Refills: 3 | Status: SHIPPED | OUTPATIENT
Start: 2022-09-19

## 2022-09-19 NOTE — TELEPHONE ENCOUNTER
Requested Prescriptions     Signed Prescriptions Disp Refills    atorvastatin (LIPITOR) 40 mg tablet 90 Tablet 3     Sig: TAKE ONE TABLET BY MOUTH ONCE NIGHTLY     Authorizing Provider: Starla Hollis     Ordering User: Maritza Enriqueing   Per Dr. Abilio Sanchez verbal orders

## 2022-09-27 NOTE — PATIENT INSTRUCTIONS
Today your INR was 1.7. Your goal INR is  2.0-3.0. You have a  5 mg tablet of Coumadin (warfarin). Take Coumadin (warfarin) as follows: Take 10 mg (2 tablets) Wednesdays and 7.5 mg (1.5 tablets) daily rest of week    Come back in 2 week(s) for your next finger stick/INR blood test.        Avoid any over the counter items containing aspirin or ibuprofen, and avoid great swings in general diet. Avoid alcohol consumption. Please notify the INR nurse if you are started on any new medication including over the counter or herbal supplements. Also, please notify your INR nurse if any of your other prescription or over the counter medications have been discontinued. Your Care Instructions  Warfarin is a medicine that you take to prevent blood clots. It is often called a blood thinner. Doctors give warfarin (such as Coumadin) to reduce the risk of blood clots. You may be at risk for blood clots if you have atrial fibrillation or deep vein thrombosis. Some other health problems may also put you at risk. Warfarin slows the amount of time it takes for your blood to clot. It can cause bleeding problems. Even if you've been taking warfarin for a while, it's important to know how to take it safely. Foods and other medicines can affect the way warfarin works. Some can make warfarin work too well. This can cause bleeding problems. And some can make it work poorly, so that it does not prevent blood clots very well. You will need regular blood tests to check how long it takes for your blood to form a clot. This test is called a PT or prothrombin time test. The result of the test is called an INR level. Depending on the test results, your doctor or anticoagulation clinic may adjust your dose of warfarin. Follow-up care is a key part of your treatment and safety. Be sure to make and go to all appointments, and call your doctor if you are having problems.  It's also a good idea to know your test results and keep a list of the medicines you take. How can you care for yourself at home? Take warfarin safely  Take your warfarin at the same time each day. If you miss a dose of warfarin, don't take an extra dose to make up for it. Your doctor can tell you exactly what to do so you don't take too much or too little. Wear medical alert jewelry that lets others know that you take warfarin. You can buy this at most drugstores. Don't take warfarin if you are pregnant or planning to get pregnant. Talk to your doctor about how you can prevent getting pregnant while you are taking it. Don't change your dose or stop taking warfarin unless your doctor tells you to. Effects of medicines and food on warfarin  Don't start or stop taking any medicines, vitamins, or natural remedies unless you first talk to your doctor. Many medicines can affect how warfarin works. These include aspirin and other pain relievers, over-the-counter medicines, multivitamins, dietary supplements, and herbal products. Tell all of your doctors and pharmacists that you take warfarin. Some prescription medicines can affect how warfarin works. Keep the amount of vitamin K in your diet about the same from day to day. Do not suddenly eat a lot more or a lot less food that is rich in vitamin K than you usually do. Vitamin K affects how warfarin works and how your blood clots. Talk with your doctor before making big changes in your diet. Vitamin K is in many foods, such as:  Leafy greens, such as kale, cabbage, spinach, Swiss chard, and lettuce. Canola and soybean oils. Green vegetables, such as asparagus, broccoli, and [place] sprouts. Vegetable drinks, green tea leaves, and some dietary supplement drinks. Avoid cranberry juice and other cranberry products. They can increase the effects of warfarin. Limit your use of alcohol. Avoid bleeding by preventing falls and injuries  Wear slippers or shoes with nonskid soles.   Remove throw rugs and clutter. Rearrange furniture and electrical cords to keep them out of walking paths. Keep stairways, porches, and outside walkways well lit. Use night-lights in hallways and bathrooms. Be extra careful when you work with sharp tools or knives. When should you call for help? Call 911 anytime you think you may need emergency care. For example, call if:  You have a sudden, severe headache that is different from past headaches. Call your doctor now or seek immediate medical care if:  You have any abnormal bleeding, such as:  Nosebleeds. Vaginal bleeding that is different (heavier, more frequent, at a different time of the month) than what you are used to. Bloody or black stools, or rectal bleeding. Bloody or pink urine. Watch closely for changes in your health, and be sure to contact your doctor if you have any problems. Where can you learn more? Go to http://www.gray.com/. Enter V233 in the search box to learn more about \"Taking Warfarin Safely: Care Instructions. \"  Current as of: January 27, 2016  Content Version: 11.1  © 8966-7018 [employer]. Care instructions adapted under license by [employer] (which disclaims liability or warranty for this information). If you have questions about a medical condition or this instruction, always ask your healthcare professional. [place] [address] any warranty or liability for your use of this information.

## 2022-09-27 NOTE — PROGRESS NOTES
Pharmacy Progress Note - Anticoagulation Management    S/O:  Mr. Opal Bustamante  is a 67 y.o. male seen today for anticoagulation management for the diagnosis of Atrial Fibrillation. HPI: At last visit (8/31) INR was 2.6 and therapeutic for INR goal 2.0-3.0. Patient was instructed to continue 10 mg Wed; 7.5 mg daily ROW and recheck INR in 4 weeks. Interim History:    Warfarin start date: Prior to November 2018 per chart review  INR Goal:  2.0-3.0    Current warfarin regimen: 10 mg Wed; 7.5 mg daily ROW                Warfarin tablet strength:   5 mg   Duration of therapy: Indefinite     Today's pertinent positives includes:  No significant changes since last visit    Results for orders placed or performed in visit on 09/28/22   AMB POC PT/INR   Result Value Ref Range    VALID INTERNAL CONTROL POC Yes     Prothrombin time (POC) 20.0 seconds    INR POC 1.7      Adherence:   Able to recall regimen? YES  Miss/extra dose? NO  Need refill? NO    Upcoming procedure(s):  NO      Past Medical History:   Diagnosis Date    Diverticulosis     Duodenal ulcer      Allergies   Allergen Reactions    Enalapril Cough    Hydrochlorothiazide Other (comments)     Weakness, fatigue, very sick, extreme weight loss     Current Outpatient Medications   Medication Sig    atorvastatin (LIPITOR) 40 mg tablet TAKE ONE TABLET BY MOUTH ONCE NIGHTLY    warfarin (COUMADIN) 5 mg tablet Take 10 mg (2 tablets) Wednesdays and 7.5 mg (1.5 tablets) daily rest of week or take as directed by clinic provider    metFORMIN (GLUCOPHAGE) 500 mg tablet Take 2 Tablets by mouth two (2) times daily (with meals). Please request future refills from pcp    carvediloL (COREG) 25 mg tablet Take 1 tablet by mouth twice daily    MAGNESIUM PO Take 400 mg by mouth. omeprazole (PRILOSEC) 20 mg capsule Take 20 mg by mouth daily. No current facility-administered medications for this visit.      Wt Readings from Last 3 Encounters:   07/06/22 242 lb (109.8 kg) 06/08/22 244 lb (110.7 kg)   05/25/22 239 lb (108.4 kg)     BP Readings from Last 3 Encounters:   05/25/22 120/80   05/04/22 116/80   04/27/22 (!) 135/91     Pulse Readings from Last 3 Encounters:   05/25/22 80   05/04/22 84   04/27/22 87     No results found for: WBC, WBCLT, HGBPOC, HGB, HGBP, HCTPOC, HCT, PHCT, RBCH, PLT, MCV, HGBEXT, HCTEXT, PLTEXT, HGBEXT, HCTEXT, PLTEXT  Lab Results   Component Value Date/Time    Sodium 138 11/03/2021 10:03 AM    Potassium 5.0 11/03/2021 10:03 AM    Chloride 106 11/03/2021 10:03 AM    CO2 28 11/03/2021 10:03 AM    Anion gap 4 (L) 11/03/2021 10:03 AM    Glucose 268 (H) 11/03/2021 10:03 AM    BUN 18 11/03/2021 10:03 AM    Creatinine 0.85 11/03/2021 10:03 AM    BUN/Creatinine ratio 21 (H) 11/03/2021 10:03 AM    GFR est AA >60 11/03/2021 10:03 AM    GFR est non-AA >60 11/03/2021 10:03 AM    Calcium 9.9 11/03/2021 10:03 AM    Bilirubin, total 0.8 11/03/2021 10:03 AM    Alk. phosphatase 75 11/03/2021 10:03 AM    Protein, total 7.4 11/03/2021 10:03 AM    Albumin 3.9 11/03/2021 10:03 AM    Globulin 3.5 11/03/2021 10:03 AM    A-G Ratio 1.1 11/03/2021 10:03 AM    ALT (SGPT) 27 11/03/2021 10:03 AM     CrCl cannot be calculated (Patient's most recent lab result is older than the maximum 180 days allowed.).       INR History:   (normal INR range 0.8-1.2)     Date   INR   PT   Dose/Comments  09/28/22 1.7  20.0 No changes  08/31/22 2.6  31.3 No changes  08/03/22 1.9  22.9 No changes  07/06/22 2.7  32.3 No changes  06/08/22 2.3  27.9 Increased to 10 mg Wed; 7.5 mg daily ROW  05/25/22 1.9  23.2 Metformin started 5/4 04/27/22 2.2  25.9 No changes  03/30/22          2.2                   26.7     No changes  03/02/22          2.1                   25.0     No changes  02/02/22          2.1                   25.4     No changes  01/13/22          1.9                   23.3     No changes  12/03/21          2.1                               7.5 mg daily    A/P:       Anticoagulation:  Considering Mr. Lou Dove past history, todays findings, and per Anticoagulation Collaborative Practice Agreement/Protocol:    Fingerstick POC INR (1.7) is Subtherapeutic for INR goal today. Continue warfarin 10 mg Wed; 7.5 mg daily ROW  INR is 1.7 and subtherapeutic for INR goal 2.0-3.0. Patient denies missed doses of warfarin or changes to his medications. Patient reports consistent intake of vitamin K foods. Since INR previously therapeutic on this weekly dose, patient will continue 10 mg Wed; 7.5 mg daily ROW and recheck INR in 2 weeks. Patient was instructed to schedule an appointment in 2 week(s) prior to leaving the clinic. Medication reconciliation was completed during the visit. A full discussion of the nature of anticoagulants has been carried out. A full discussion of the need for frequent and regular monitoring, precise dosage adjustment and compliance was stressed. Side effects of potential bleeding were discussed and Mr. Marroquin was instructed to call [phone] if there are any signs of abnormal bleeding. Mr. Marroquin was instructed to avoid any OTC items containing aspirin or ibuprofen and prior to starting any new OTC products to consult with his physician or pharmacist to ensure no drug interactions are present. Mr. Marroquin was instructed to avoid any major changes in his general diet and to avoid alcohol consumption. Mr. Marroquin was provided information in the AVS that includes topics on understanding coumadin therapy, drug interaction considerations, vitamin K and coumadin use, interactions with foods and supplements containing vitamin K, and the use of herbal products. Mr. Marroquin verbalized his understanding of all instructions and will call the office with any questions, concerns, or signs of abnormal bleeding or blood clot. Notifications of recommendations will be sent to Dr. Ryan Lora MD  for review.     Thank you,  Candie Allen, PharmD      For Pharmacy Admin Tracking Only    Intervention Detail: Adherence Monitorin and Lab(s) Ordered  Total # of Interventions Recommended: 2  Total # of Interventions Accepted: 2  Time Spent (min): 20

## 2022-09-28 ENCOUNTER — ANTI-COAG VISIT (OUTPATIENT)
Dept: PHARMACY | Age: 72
End: 2022-09-28
Payer: MEDICARE

## 2022-09-28 DIAGNOSIS — Z79.01 LONG TERM (CURRENT) USE OF ANTICOAGULANTS: ICD-10-CM

## 2022-09-28 DIAGNOSIS — I48.0 PAROXYSMAL ATRIAL FIBRILLATION (HCC): Primary | ICD-10-CM

## 2022-09-28 LAB
INR BLD: 1.7
PT POC: 20 SECONDS
VALID INTERNAL CONTROL?: YES

## 2022-09-28 PROCEDURE — 99211 OFF/OP EST MAY X REQ PHY/QHP: CPT

## 2022-10-10 NOTE — PROGRESS NOTES
Pharmacy Progress Note - Anticoagulation Management    S/O:  Mr. Ena Bocanegra  is a 67 y.o. male seen today for anticoagulation management for the diagnosis of Atrial Fibrillation. HPI: At last visit (9/28) INR was 1.7 and subtherapeutic for INR goal 2.0-3.0. Since INR previously therapeutic on this weekly dose, patient was instructed to continue 10 mg Wed; 7.5 mg daily ROW and recheck INR in 2 weeks. Interim History:    Warfarin start date: Prior to November 2018 per chart review  INR Goal:  2.0-3.0    Current warfarin regimen: 10 mg Wed; 7.5 mg daily ROW                Warfarin tablet strength:   5 mg   Duration of therapy: Indefinite     Today's pertinent positives includes:  No significant changes since last visit    Results for orders placed or performed in visit on 10/12/22   AMB POC PT/INR   Result Value Ref Range    VALID INTERNAL CONTROL POC Yes     Prothrombin time (POC) 25.2 seconds    INR POC 2.1      Adherence:   Able to recall regimen? YES  Miss/extra dose? NO  Need refill?  NO    Upcoming procedure(s):  NO    INR History:   (normal INR range 0.8-1.2)     Date   INR   PT   Dose/Comments  10/12/22 2.1  25.2 No changes  09/28/22 1.7  20.0 No changes  08/31/22 2.6  31.3 No changes  08/03/22 1.9  22.9 No changes  07/06/22 2.7  32.3 No changes  06/08/22 2.3  27.9 Increased to 10 mg Wed; 7.5 mg daily ROW  05/25/22 1.9  23.2 Metformin started 5/4 04/27/22 2.2  25.9 No changes  03/30/22          2.2                   26.7     No changes  03/02/22          2.1                   25.0     No changes  02/02/22          2.1                   25.4     No changes  01/13/22          1.9                   23.3     No changes  12/03/21          2.1                               7.5 mg daily    A/P:       Anticoagulation:  Considering Mr. Taylor Bryan past history, todays findings, and per Anticoagulation Collaborative Practice Agreement/Protocol:    Fingerstick POC INR (2.1) is Therapeutic for INR goal today.  Continue warfarin 10 mg Wed; 7.5 mg daily ROW  At last visit (9/28) INR was 1.7 and subtherapeutic for INR goal 2.0-3.0. Since INR previously therapeutic on this weekly dose, patient was instructed to continue 10 mg Wed; 7.5 mg daily ROW and recheck INR in 2 weeks. Today INR is 2.1 and therapeutic. Patient denies changes to his other medications and reports consistent intake of vitamin K foods. Since INR is therapeutic, patient will continue 10 mg Wed; 7.5 mg daily ROW. Will recheck INR in 4 weeks. Patient was instructed to schedule an appointment in 4 week(s) prior to leaving the clinic. Medication reconciliation was completed during the visit. A full discussion of the nature of anticoagulants has been carried out. A full discussion of the need for frequent and regular monitoring, precise dosage adjustment and compliance was stressed. Side effects of potential bleeding were discussed and Mr. Marroquin was instructed to call [phone] if there are any signs of abnormal bleeding. Mr. Marroquin was instructed to avoid any OTC items containing aspirin or ibuprofen and prior to starting any new OTC products to consult with his physician or pharmacist to ensure no drug interactions are present. Mr. Marroquin was instructed to avoid any major changes in his general diet and to avoid alcohol consumption. Mr. Marroquin was provided information in the AVS that includes topics on understanding coumadin therapy, drug interaction considerations, vitamin K and coumadin use, interactions with foods and supplements containing vitamin K, and the use of herbal products. Mr. Marroquin verbalized his understanding of all instructions and will call the office with any questions, concerns, or signs of abnormal bleeding or blood clot. Notifications of recommendations will be sent to Dr. Mckenzie Siddiqui MD  for review.     Thank you,  Shania Shankar, PharmD      For Pharmacy Admin Tracking Only    Intervention Detail: Adherence Monitorin and Lab(s) Ordered  Total # of Interventions Recommended: 2  Total # of Interventions Accepted: 2  Time Spent (min): 20

## 2022-10-10 NOTE — PATIENT INSTRUCTIONS
Today your INR was 2.1. Your goal INR is  2.0-3.0. You have a  5 mg tablet of Coumadin (warfarin). Take Coumadin (warfarin) as follows: Take 10 mg (2 tablets) Wednesdays and 7.5 mg (1.5 tablets) daily rest of week    Come back in 4 week(s) for your next finger stick/INR blood test.        Avoid any over the counter items containing aspirin or ibuprofen, and avoid great swings in general diet. Avoid alcohol consumption. Please notify the INR nurse if you are started on any new medication including over the counter or herbal supplements. Also, please notify your INR nurse if any of your other prescription or over the counter medications have been discontinued. Your Care Instructions  Warfarin is a medicine that you take to prevent blood clots. It is often called a blood thinner. Doctors give warfarin (such as Coumadin) to reduce the risk of blood clots. You may be at risk for blood clots if you have atrial fibrillation or deep vein thrombosis. Some other health problems may also put you at risk. Warfarin slows the amount of time it takes for your blood to clot. It can cause bleeding problems. Even if you've been taking warfarin for a while, it's important to know how to take it safely. Foods and other medicines can affect the way warfarin works. Some can make warfarin work too well. This can cause bleeding problems. And some can make it work poorly, so that it does not prevent blood clots very well. You will need regular blood tests to check how long it takes for your blood to form a clot. This test is called a PT or prothrombin time test. The result of the test is called an INR level. Depending on the test results, your doctor or anticoagulation clinic may adjust your dose of warfarin. Follow-up care is a key part of your treatment and safety. Be sure to make and go to all appointments, and call your doctor if you are having problems.  It's also a good idea to know your test results and keep a list of the medicines you take. How can you care for yourself at home? Take warfarin safely  Take your warfarin at the same time each day. If you miss a dose of warfarin, don't take an extra dose to make up for it. Your doctor can tell you exactly what to do so you don't take too much or too little. Wear medical alert jewelry that lets others know that you take warfarin. You can buy this at most drugstores. Don't take warfarin if you are pregnant or planning to get pregnant. Talk to your doctor about how you can prevent getting pregnant while you are taking it. Don't change your dose or stop taking warfarin unless your doctor tells you to. Effects of medicines and food on warfarin  Don't start or stop taking any medicines, vitamins, or natural remedies unless you first talk to your doctor. Many medicines can affect how warfarin works. These include aspirin and other pain relievers, over-the-counter medicines, multivitamins, dietary supplements, and herbal products. Tell all of your doctors and pharmacists that you take warfarin. Some prescription medicines can affect how warfarin works. Keep the amount of vitamin K in your diet about the same from day to day. Do not suddenly eat a lot more or a lot less food that is rich in vitamin K than you usually do. Vitamin K affects how warfarin works and how your blood clots. Talk with your doctor before making big changes in your diet. Vitamin K is in many foods, such as:  Leafy greens, such as kale, cabbage, spinach, Swiss chard, and lettuce. Canola and soybean oils. Green vegetables, such as asparagus, broccoli, and [place] sprouts. Vegetable drinks, green tea leaves, and some dietary supplement drinks. Avoid cranberry juice and other cranberry products. They can increase the effects of warfarin. Limit your use of alcohol. Avoid bleeding by preventing falls and injuries  Wear slippers or shoes with nonskid soles.   Remove throw rugs and clutter. Rearrange furniture and electrical cords to keep them out of walking paths. Keep stairways, porches, and outside walkways well lit. Use night-lights in hallways and bathrooms. Be extra careful when you work with sharp tools or knives. When should you call for help? Call 911 anytime you think you may need emergency care. For example, call if:  You have a sudden, severe headache that is different from past headaches. Call your doctor now or seek immediate medical care if:  You have any abnormal bleeding, such as:  Nosebleeds. Vaginal bleeding that is different (heavier, more frequent, at a different time of the month) than what you are used to. Bloody or black stools, or rectal bleeding. Bloody or pink urine. Watch closely for changes in your health, and be sure to contact your doctor if you have any problems. Where can you learn more? Go to http://www.gray.com/. Enter I768 in the search box to learn more about \"Taking Warfarin Safely: Care Instructions. \"  Current as of: January 27, 2016  Content Version: 11.1  © 8649-4780 [employer]. Care instructions adapted under license by [employer] (which disclaims liability or warranty for this information). If you have questions about a medical condition or this instruction, always ask your healthcare professional. [place] [address] any warranty or liability for your use of this information.

## 2022-10-12 ENCOUNTER — ANTI-COAG VISIT (OUTPATIENT)
Dept: PHARMACY | Age: 72
End: 2022-10-12
Payer: MEDICARE

## 2022-10-12 DIAGNOSIS — Z79.01 LONG TERM (CURRENT) USE OF ANTICOAGULANTS: ICD-10-CM

## 2022-10-12 DIAGNOSIS — I48.0 PAROXYSMAL ATRIAL FIBRILLATION (HCC): Primary | ICD-10-CM

## 2022-10-12 LAB
INR BLD: 2.1
PT POC: 25.2 SECONDS
VALID INTERNAL CONTROL?: YES

## 2022-10-12 PROCEDURE — 99211 OFF/OP EST MAY X REQ PHY/QHP: CPT

## 2022-10-23 DIAGNOSIS — I48.0 PAROXYSMAL ATRIAL FIBRILLATION (HCC): ICD-10-CM

## 2022-10-24 RX ORDER — CARVEDILOL 25 MG/1
TABLET ORAL
Qty: 180 TABLET | Refills: 0 | Status: SHIPPED | OUTPATIENT
Start: 2022-10-24 | End: 2022-11-10 | Stop reason: SDUPTHER

## 2022-10-24 NOTE — TELEPHONE ENCOUNTER
Requested Prescriptions     Signed Prescriptions Disp Refills    carvediloL (COREG) 25 mg tablet 180 Tablet 0     Sig: Take 1 tablet by mouth twice daily     Authorizing Provider: Osito Carlos     Ordering User: Ana Hurt Grade verbal orders

## 2022-11-02 NOTE — PROGRESS NOTES
Pharmacy Progress Note - Anticoagulation Management    S/O:  Mr. Herb Cortez  is a 67 y.o. male seen today for anticoagulation management for the diagnosis of Atrial Fibrillation. HPI: At last visit (10/12) INR was 2.1 and therapeutic for INR goal 2.0-3.0. Patient was instructed to continue 10 mg Wed; 7.5 mg daily ROW and recheck INR in 4 weeks. Interim History:    Warfarin start date: Prior to November 2018 per chart review  INR Goal:  2.0-3.0    Current warfarin regimen: 10 mg Wed; 7.5 mg daily ROW                Warfarin tablet strength:   5 mg   Duration of therapy: Indefinite     Today's pertinent positives includes:    Patient reports he has noticed weight loss in the past month. Results for orders placed or performed in visit on 11/10/22   AMB POC PT/INR   Result Value Ref Range    VALID INTERNAL CONTROL POC Yes     Prothrombin time (POC) 37.5 seconds    INR POC 3.1      Adherence:   Able to recall regimen? YES  Miss/extra dose? NO  Need refill?  NO    Upcoming procedure(s):  NO    INR History:   (normal INR range 0.8-1.2)     Date   INR   PT   Dose/Comments  11/10/22 3.1  37.5 Weight loss in past month  10/12/22 2.1  25.2 No changes  09/28/22 1.7  20.0 No changes  08/31/22 2.6  31.3 No changes  08/03/22 1.9  22.9 No changes  07/06/22 2.7  32.3 No changes  06/08/22 2.3  27.9 Increased to 10 mg Wed; 7.5 mg daily ROW  05/25/22 1.9  23.2 Metformin started 5/4 04/27/22 2.2  25.9 No changes  03/30/22          2.2                   26.7     No changes  03/02/22          2.1                   25.0     No changes  02/02/22          2.1                   25.4     No changes  01/13/22          1.9                   23.3     No changes  12/03/21          2.1                               7.5 mg daily    A/P:       Anticoagulation:  Considering Mr. Nayely Giron past history, todays findings, and per Anticoagulation Collaborative Practice Agreement/Protocol:    Fingerstick POC INR (3.1) is Supratherapeutic for INR goal today. Continue warfarin 10 mg Wed; 7.5 mg daily ROW  INR is 3.1 and slightly supratherapeutic for INR goal 2.0-3.0. Patient reports he has noticed weight loss in the past month. Patient is scheduled to see Dr. Lilian Rivera today for annual follow up. Patient denies changes to his other medications and reports consistent intake of vitamin K foods. Since INR previously therapeutic on this weekly dose, patient will continue 10 mg Wed; 7.5 mg daily ROW and recheck INR in 2 weeks. Due to Thanksgiving holiday, follow up INR scheduled for 11/29. Patient was instructed to schedule an appointment in 2 week(s) prior to leaving the clinic. Medication reconciliation was completed during the visit. A full discussion of the nature of anticoagulants has been carried out. A full discussion of the need for frequent and regular monitoring, precise dosage adjustment and compliance was stressed. Side effects of potential bleeding were discussed and Mr. Marroquin was instructed to call [phone] if there are any signs of abnormal bleeding. Mr. Marroquin was instructed to avoid any OTC items containing aspirin or ibuprofen and prior to starting any new OTC products to consult with his physician or pharmacist to ensure no drug interactions are present. Mr. Marroquin was instructed to avoid any major changes in his general diet and to avoid alcohol consumption. Mr. Marroquin was provided information in the AVS that includes topics on understanding coumadin therapy, drug interaction considerations, vitamin K and coumadin use, interactions with foods and supplements containing vitamin K, and the use of herbal products. Mr. Marroquin verbalized his understanding of all instructions and will call the office with any questions, concerns, or signs of abnormal bleeding or blood clot. Notifications of recommendations will be sent to Dr. Grazyna Dozier MD  for review.     Thank you,  Cb Del Rosario, TessaD      For Pharmacy Admin Tracking Only    Intervention Detail: Adherence Monitorin and Lab(s) Ordered  Total # of Interventions Recommended: 2  Total # of Interventions Accepted: 2  Time Spent (min): 20

## 2022-11-02 NOTE — PATIENT INSTRUCTIONS
Today your INR was 3.1. Your goal INR is  2.0-3.0. You have a  5 mg tablet of Coumadin (warfarin). Take Coumadin (warfarin) as follows: Take 10 mg (2 tablets) Wednesdays and 7.5 mg (1.5 tablets) daily rest of week    Come back in 2 week(s) for your next finger stick/INR blood test.        Avoid any over the counter items containing aspirin or ibuprofen, and avoid great swings in general diet. Avoid alcohol consumption. Please notify the INR nurse if you are started on any new medication including over the counter or herbal supplements. Also, please notify your INR nurse if any of your other prescription or over the counter medications have been discontinued. Your Care Instructions  Warfarin is a medicine that you take to prevent blood clots. It is often called a blood thinner. Doctors give warfarin (such as Coumadin) to reduce the risk of blood clots. You may be at risk for blood clots if you have atrial fibrillation or deep vein thrombosis. Some other health problems may also put you at risk. Warfarin slows the amount of time it takes for your blood to clot. It can cause bleeding problems. Even if you've been taking warfarin for a while, it's important to know how to take it safely. Foods and other medicines can affect the way warfarin works. Some can make warfarin work too well. This can cause bleeding problems. And some can make it work poorly, so that it does not prevent blood clots very well. You will need regular blood tests to check how long it takes for your blood to form a clot. This test is called a PT or prothrombin time test. The result of the test is called an INR level. Depending on the test results, your doctor or anticoagulation clinic may adjust your dose of warfarin. Follow-up care is a key part of your treatment and safety. Be sure to make and go to all appointments, and call your doctor if you are having problems.  It's also a good idea to know your test results and keep a list of the medicines you take. How can you care for yourself at home? Take warfarin safely  Take your warfarin at the same time each day. If you miss a dose of warfarin, don't take an extra dose to make up for it. Your doctor can tell you exactly what to do so you don't take too much or too little. Wear medical alert jewelry that lets others know that you take warfarin. You can buy this at most drugstores. Don't take warfarin if you are pregnant or planning to get pregnant. Talk to your doctor about how you can prevent getting pregnant while you are taking it. Don't change your dose or stop taking warfarin unless your doctor tells you to. Effects of medicines and food on warfarin  Don't start or stop taking any medicines, vitamins, or natural remedies unless you first talk to your doctor. Many medicines can affect how warfarin works. These include aspirin and other pain relievers, over-the-counter medicines, multivitamins, dietary supplements, and herbal products. Tell all of your doctors and pharmacists that you take warfarin. Some prescription medicines can affect how warfarin works. Keep the amount of vitamin K in your diet about the same from day to day. Do not suddenly eat a lot more or a lot less food that is rich in vitamin K than you usually do. Vitamin K affects how warfarin works and how your blood clots. Talk with your doctor before making big changes in your diet. Vitamin K is in many foods, such as:  Leafy greens, such as kale, cabbage, spinach, Swiss chard, and lettuce. Canola and soybean oils. Green vegetables, such as asparagus, broccoli, and [place] sprouts. Vegetable drinks, green tea leaves, and some dietary supplement drinks. Avoid cranberry juice and other cranberry products. They can increase the effects of warfarin. Limit your use of alcohol. Avoid bleeding by preventing falls and injuries  Wear slippers or shoes with nonskid soles.   Remove throw rugs and clutter. Rearrange furniture and electrical cords to keep them out of walking paths. Keep stairways, porches, and outside walkways well lit. Use night-lights in hallways and bathrooms. Be extra careful when you work with sharp tools or knives. When should you call for help? Call 911 anytime you think you may need emergency care. For example, call if:  You have a sudden, severe headache that is different from past headaches. Call your doctor now or seek immediate medical care if:  You have any abnormal bleeding, such as:  Nosebleeds. Vaginal bleeding that is different (heavier, more frequent, at a different time of the month) than what you are used to. Bloody or black stools, or rectal bleeding. Bloody or pink urine. Watch closely for changes in your health, and be sure to contact your doctor if you have any problems. Where can you learn more? Go to http://www.gray.com/. Enter Q972 in the search box to learn more about \"Taking Warfarin Safely: Care Instructions. \"  Current as of: January 27, 2016  Content Version: 11.1  © 3151-1460 [employer]. Care instructions adapted under license by [employer] (which disclaims liability or warranty for this information). If you have questions about a medical condition or this instruction, always ask your healthcare professional. [place] [address] any warranty or liability for your use of this information.

## 2022-11-10 ENCOUNTER — OFFICE VISIT (OUTPATIENT)
Dept: CARDIOLOGY CLINIC | Age: 72
End: 2022-11-10

## 2022-11-10 ENCOUNTER — ANTI-COAG VISIT (OUTPATIENT)
Dept: PHARMACY | Age: 72
End: 2022-11-10
Payer: MEDICARE

## 2022-11-10 VITALS
WEIGHT: 263 LBS | HEIGHT: 70 IN | RESPIRATION RATE: 16 BRPM | HEART RATE: 107 BPM | BODY MASS INDEX: 37.65 KG/M2 | OXYGEN SATURATION: 96 % | SYSTOLIC BLOOD PRESSURE: 138 MMHG | DIASTOLIC BLOOD PRESSURE: 86 MMHG

## 2022-11-10 DIAGNOSIS — I48.0 PAROXYSMAL ATRIAL FIBRILLATION (HCC): ICD-10-CM

## 2022-11-10 DIAGNOSIS — E11.65 TYPE 2 DIABETES MELLITUS WITH HYPERGLYCEMIA, WITHOUT LONG-TERM CURRENT USE OF INSULIN (HCC): ICD-10-CM

## 2022-11-10 DIAGNOSIS — I48.0 PAROXYSMAL ATRIAL FIBRILLATION (HCC): Primary | ICD-10-CM

## 2022-11-10 DIAGNOSIS — E78.2 MIXED HYPERLIPIDEMIA: ICD-10-CM

## 2022-11-10 DIAGNOSIS — Z79.01 LONG TERM (CURRENT) USE OF ANTICOAGULANTS: ICD-10-CM

## 2022-11-10 DIAGNOSIS — I25.10 CORONARY ARTERY DISEASE INVOLVING NATIVE CORONARY ARTERY OF NATIVE HEART WITHOUT ANGINA PECTORIS: Primary | ICD-10-CM

## 2022-11-10 DIAGNOSIS — I10 PRIMARY HYPERTENSION: ICD-10-CM

## 2022-11-10 LAB
INR BLD: 3.1
PT POC: 37.5 SECONDS
VALID INTERNAL CONTROL?: YES

## 2022-11-10 PROCEDURE — G8417 CALC BMI ABV UP PARAM F/U: HCPCS | Performed by: SPECIALIST

## 2022-11-10 PROCEDURE — 1101F PT FALLS ASSESS-DOCD LE1/YR: CPT | Performed by: SPECIALIST

## 2022-11-10 PROCEDURE — 3078F DIAST BP <80 MM HG: CPT | Performed by: SPECIALIST

## 2022-11-10 PROCEDURE — 99213 OFFICE O/P EST LOW 20 MIN: CPT | Performed by: SPECIALIST

## 2022-11-10 PROCEDURE — G8754 DIAS BP LESS 90: HCPCS | Performed by: SPECIALIST

## 2022-11-10 PROCEDURE — G8427 DOCREV CUR MEDS BY ELIG CLIN: HCPCS | Performed by: SPECIALIST

## 2022-11-10 PROCEDURE — 99211 OFF/OP EST MAY X REQ PHY/QHP: CPT

## 2022-11-10 PROCEDURE — G8536 NO DOC ELDER MAL SCRN: HCPCS | Performed by: SPECIALIST

## 2022-11-10 PROCEDURE — 3017F COLORECTAL CA SCREEN DOC REV: CPT | Performed by: SPECIALIST

## 2022-11-10 PROCEDURE — G8752 SYS BP LESS 140: HCPCS | Performed by: SPECIALIST

## 2022-11-10 PROCEDURE — 3074F SYST BP LT 130 MM HG: CPT | Performed by: SPECIALIST

## 2022-11-10 PROCEDURE — 3046F HEMOGLOBIN A1C LEVEL >9.0%: CPT | Performed by: SPECIALIST

## 2022-11-10 PROCEDURE — G8432 DEP SCR NOT DOC, RNG: HCPCS | Performed by: SPECIALIST

## 2022-11-10 PROCEDURE — 1123F ACP DISCUSS/DSCN MKR DOCD: CPT | Performed by: SPECIALIST

## 2022-11-10 PROCEDURE — 2022F DILAT RTA XM EVC RTNOPTHY: CPT | Performed by: SPECIALIST

## 2022-11-10 RX ORDER — METFORMIN HYDROCHLORIDE 500 MG/1
1000 TABLET ORAL 2 TIMES DAILY WITH MEALS
Qty: 360 TABLET | Refills: 0 | Status: SHIPPED | OUTPATIENT
Start: 2022-11-10

## 2022-11-10 RX ORDER — CARVEDILOL 25 MG/1
25 TABLET ORAL 2 TIMES DAILY
Qty: 180 TABLET | Refills: 1 | Status: SHIPPED | OUTPATIENT
Start: 2022-11-10

## 2022-11-10 NOTE — PROGRESS NOTES
HISTORY OF PRESENT ILLNESS  Ena Bocanegra is a 67 y.o. male     SUMMARY:   Problem List  Date Reviewed: 11/10/2022            Codes Class Noted    Severe obesity ([facility] ICD-10-CM: E66.01  ICD-9-CM: 278.01  3/25/2019        Long term (current) use of anticoagulants ICD-10-CM: Z79.01  ICD-9-CM: V58.61  11/12/2018        Paroxysmal atrial fibrillation ([facility] ICD-10-CM: I48.0  ICD-9-CM: 427.31  3/7/2017        Type 2 diabetes mellitus with hyperglycemia, without long-term current use of insulin ([facility] ICD-10-CM: E11.65  ICD-9-CM: 250.00, 790.29  2/23/2017        PVC (premature ventricular contraction) ICD-10-CM: I49.3  ICD-9-CM: 427.69  9/4/2014        HTN (hypertension) ICD-10-CM: I10  ICD-9-CM: 401.9  7/21/2014        Hyperlipidemia ICD-10-CM: E78.5  ICD-9-CM: 272.4  7/21/2014        CAD (coronary artery disease) ICD-10-CM: I25.10  ICD-9-CM: 414.00  7/21/2014    Overview Addendum 9/4/2014 11:55 AM by Marissa Leavitt MD     12/12 abnormal stress test [place], then bms to ramus, mod distal disease, normal lvef  60-70% distal pda and lad. Current Outpatient Medications on File Prior to Visit   Medication Sig    carvediloL (COREG) 25 mg tablet Take 1 tablet by mouth twice daily    atorvastatin (LIPITOR) 40 mg tablet TAKE ONE TABLET BY MOUTH ONCE NIGHTLY    warfarin (COUMADIN) 5 mg tablet Take 10 mg (2 tablets) Wednesdays and 7.5 mg (1.5 tablets) daily rest of week or take as directed by clinic provider    metFORMIN (GLUCOPHAGE) 500 mg tablet Take 2 Tablets by mouth two (2) times daily (with meals). Please request future refills from pcp    MAGNESIUM PO Take 400 mg by mouth. omeprazole (PRILOSEC) 20 mg capsule Take 20 mg by mouth daily. No current facility-administered medications on file prior to visit.        CARDIOLOGY STUDIES TO DATE:  12/12 exercise cardiolyte apical ischemia, lvef 61%  12/12 normal echo    Chief Complaint   Patient presents with    Follow-up     HPI :  He is still been unable to find a primary care even though he said he is called some of the places on the list we gave him. He has gained a bunch of weight and he is complaining of some balance issues. He is keeping up with his Coumadin checks. We refilled his metformin for 90 days last time but has been out of it for a while and he says his sugars are running in the low 300s. CARDIAC ROS:   negative for chest pain, dyspnea, palpitations, syncope, orthopnea, paroxysmal nocturnal dyspnea, exertional chest pressure/discomfort, claudication, lower extremity edema    Family History   Problem Relation Age of Onset    Heart Disease Father        Past Medical History:   Diagnosis Date    Diverticulosis     Duodenal ulcer        GENERAL ROS:  A comprehensive review of systems was negative except for that written in the HPI.     Visit Vitals  /86   Pulse (!) 107   Resp 16   Ht 5' 10\" (1.778 m)   Wt 263 lb (119.3 kg)   SpO2 96%   BMI 37.74 kg/m²       Wt Readings from Last 3 Encounters:   11/10/22 263 lb (119.3 kg)   07/06/22 242 lb (109.8 kg)   06/08/22 244 lb (110.7 kg)            BP Readings from Last 3 Encounters:   11/10/22 138/86   05/25/22 120/80   05/04/22 116/80       PHYSICAL EXAM  General appearance: alert, cooperative, no distress, appears stated age  Neurologic: Alert and oriented X 3  Neck: supple, symmetrical, trachea midline, no adenopathy, no carotid bruit, and no JVD  Lungs: clear to auscultation bilaterally  Heart: irregularly irregular rhythm, S1, S2 normal, no S3 or S4  Extremities: extremities normal, atraumatic, no cyanosis or edema    Lab Results   Component Value Date/Time    Cholesterol, total 154 11/03/2021 10:03 AM    Cholesterol, total 145 09/18/2020 10:55 AM    Cholesterol, total 161 09/21/2017 10:14 AM    Cholesterol, total 148 02/23/2017 11:14 AM    Cholesterol, total 151 08/25/2016 10:37 AM    HDL Cholesterol 31 11/03/2021 10:03 AM    HDL Cholesterol 29 (L) 09/18/2020 10:55 AM    HDL Cholesterol 34 (L) 09/21/2017 10:14 AM    HDL Cholesterol 31 (L) 02/23/2017 11:14 AM    HDL Cholesterol 29 (L) 08/25/2016 10:37 AM    LDL, calculated 86 11/03/2021 10:03 AM    LDL, calculated 87 09/18/2020 10:55 AM    LDL, calculated 93 09/21/2017 10:14 AM    LDL, calculated 84 02/23/2017 11:14 AM    LDL, calculated 93 08/25/2016 10:37 AM    LDL, calculated 91 03/03/2016 11:42 AM    Triglyceride 185 (H) 11/03/2021 10:03 AM    Triglyceride 168 (H) 09/18/2020 10:55 AM    Triglyceride 171 (H) 09/21/2017 10:14 AM    Triglyceride 166 (H) 02/23/2017 11:14 AM    Triglyceride 145 08/25/2016 10:37 AM    CHOL/HDL Ratio 5.0 11/03/2021 10:03 AM     ASSESSMENT :      I told him he should try establishing care with a better matter patient first as primary if he cannot get in with another provider because he really does need to get his other issues under control. In the meantime we will going to refill his metformin. current treatment plan is effective, no change in therapy  lab results and schedule of future lab studies reviewed with patient  reviewed diet, exercise and weight control    Encounter Diagnoses   Name Primary? Coronary artery disease involving native coronary artery of native heart without angina pectoris Yes    Primary hypertension     Mixed hyperlipidemia     Paroxysmal atrial fibrillation ([facility]     Long term (current) use of anticoagulants      No orders of the defined types were placed in this encounter. Follow-up and Dispositions    Return in about 6 months (around 5/10/2023). Josafat Garcia MD  11/10/2022  Please note that this dictation was completed with [employer], the [facility] voice recognition software. Quite often unanticipated grammatical, syntax, homophones, and other interpretive errors are inadvertently transcribed by the computer software. Please disregard these errors. Please excuse any errors that have escaped final proofreading. Thank you.

## 2022-11-15 DIAGNOSIS — I48.0 PAROXYSMAL ATRIAL FIBRILLATION (HCC): ICD-10-CM

## 2022-11-15 DIAGNOSIS — Z79.01 LONG TERM (CURRENT) USE OF ANTICOAGULANTS: ICD-10-CM

## 2022-11-20 RX ORDER — WARFARIN SODIUM 5 MG/1
TABLET ORAL
Qty: 150 TABLET | Refills: 0 | Status: SHIPPED | OUTPATIENT
Start: 2022-11-20

## 2022-11-29 ENCOUNTER — TELEPHONE (OUTPATIENT)
Dept: PHARMACY | Age: 72
End: 2022-11-29

## 2022-11-29 DIAGNOSIS — Z79.01 LONG TERM (CURRENT) USE OF ANTICOAGULANTS: ICD-10-CM

## 2022-11-29 DIAGNOSIS — I48.0 PAROXYSMAL ATRIAL FIBRILLATION (HCC): ICD-10-CM

## 2022-12-08 NOTE — PATIENT INSTRUCTIONS
Today your INR was 2.3. Your goal INR is  2.0-3.0. You have a  5 mg tablet of Coumadin (warfarin). Take Coumadin (warfarin) as follows: Take 10 mg (2 tablets) Wednesdays and 7.5 mg (1.5 tablets) daily rest of week    Come back in 4 week(s) for your next finger stick/INR blood test.        Avoid any over the counter items containing aspirin or ibuprofen, and avoid great swings in general diet. Avoid alcohol consumption. Please notify the INR nurse if you are started on any new medication including over the counter or herbal supplements. Also, please notify your INR nurse if any of your other prescription or over the counter medications have been discontinued. Your Care Instructions  Warfarin is a medicine that you take to prevent blood clots. It is often called a blood thinner. Doctors give warfarin (such as Coumadin) to reduce the risk of blood clots. You may be at risk for blood clots if you have atrial fibrillation or deep vein thrombosis. Some other health problems may also put you at risk. Warfarin slows the amount of time it takes for your blood to clot. It can cause bleeding problems. Even if you've been taking warfarin for a while, it's important to know how to take it safely. Foods and other medicines can affect the way warfarin works. Some can make warfarin work too well. This can cause bleeding problems. And some can make it work poorly, so that it does not prevent blood clots very well. You will need regular blood tests to check how long it takes for your blood to form a clot. This test is called a PT or prothrombin time test. The result of the test is called an INR level. Depending on the test results, your doctor or anticoagulation clinic may adjust your dose of warfarin. Follow-up care is a key part of your treatment and safety. Be sure to make and go to all appointments, and call your doctor if you are having problems.  It's also a good idea to know your test results and keep a list of the medicines you take. How can you care for yourself at home? Take warfarin safely  Take your warfarin at the same time each day. If you miss a dose of warfarin, don't take an extra dose to make up for it. Your doctor can tell you exactly what to do so you don't take too much or too little. Wear medical alert jewelry that lets others know that you take warfarin. You can buy this at most drugstores. Don't take warfarin if you are pregnant or planning to get pregnant. Talk to your doctor about how you can prevent getting pregnant while you are taking it. Don't change your dose or stop taking warfarin unless your doctor tells you to. Effects of medicines and food on warfarin  Don't start or stop taking any medicines, vitamins, or natural remedies unless you first talk to your doctor. Many medicines can affect how warfarin works. These include aspirin and other pain relievers, over-the-counter medicines, multivitamins, dietary supplements, and herbal products. Tell all of your doctors and pharmacists that you take warfarin. Some prescription medicines can affect how warfarin works. Keep the amount of vitamin K in your diet about the same from day to day. Do not suddenly eat a lot more or a lot less food that is rich in vitamin K than you usually do. Vitamin K affects how warfarin works and how your blood clots. Talk with your doctor before making big changes in your diet. Vitamin K is in many foods, such as:  Leafy greens, such as kale, cabbage, spinach, Swiss chard, and lettuce. Canola and soybean oils. Green vegetables, such as asparagus, broccoli, and [place] sprouts. Vegetable drinks, green tea leaves, and some dietary supplement drinks. Avoid cranberry juice and other cranberry products. They can increase the effects of warfarin. Limit your use of alcohol. Avoid bleeding by preventing falls and injuries  Wear slippers or shoes with nonskid soles.   Remove throw rugs and clutter. Rearrange furniture and electrical cords to keep them out of walking paths. Keep stairways, porches, and outside walkways well lit. Use night-lights in hallways and bathrooms. Be extra careful when you work with sharp tools or knives. When should you call for help? Call 911 anytime you think you may need emergency care. For example, call if:  You have a sudden, severe headache that is different from past headaches. Call your doctor now or seek immediate medical care if:  You have any abnormal bleeding, such as:  Nosebleeds. Vaginal bleeding that is different (heavier, more frequent, at a different time of the month) than what you are used to. Bloody or black stools, or rectal bleeding. Bloody or pink urine. Watch closely for changes in your health, and be sure to contact your doctor if you have any problems. Where can you learn more? Go to http://www.gray.com/. Enter H612 in the search box to learn more about \"Taking Warfarin Safely: Care Instructions. \"  Current as of: January 27, 2016  Content Version: 11.1  © 7617-2211 [employer]. Care instructions adapted under license by [employer] (which disclaims liability or warranty for this information). If you have questions about a medical condition or this instruction, always ask your healthcare professional. [place] [address] any warranty or liability for your use of this information.

## 2022-12-08 NOTE — PROGRESS NOTES
Pharmacy Progress Note - Anticoagulation Management    S/O:  Mr. Juwan Laird  is a 67 y.o. male seen today for anticoagulation management for the diagnosis of Atrial Fibrillation. HPI: At last visit (11/10) INR was 3.1 and slightly supratherapeutic for INR goal 2.0-3.0. Since INR previously therapeutic on this weekly dose, patient instructed to continue 10 mg Wed; 7.5 mg daily ROW and recheck INR in 2 weeks. Due to Thanksgiving holiday, follow up INR scheduled for 11/29. Patient missed INR appointment on 11/29. Patient returned call and reported he had INR checked 11/28 at [facility] and it was 2.3. Follow up INR rescheduled to 12/13. Interim History:    Warfarin start date: Prior to November 2018 per chart review  INR Goal:  2.0-3.0    Current warfarin regimen: 10 mg Wed; 7.5 mg daily ROW                Warfarin tablet strength:   5 mg   Duration of therapy: Indefinite     Today's pertinent positives includes:  Medication Change    Patient reports he has restarted metformin 2000 mg per day within the past 2-3 weeks. Results for orders placed or performed in visit on 12/13/22   AMB POC PT/INR   Result Value Ref Range    VALID INTERNAL CONTROL POC Yes     Prothrombin time (POC) 27.3 seconds    INR POC 2.3      Adherence:   Able to recall regimen? YES  Miss/extra dose? NO  Need refill?  NO    Upcoming procedure(s):  NO    INR History:   (normal INR range 0.8-1.2)     Date   INR   PT   Dose/Comments  12/13/22 2.3  27.3 No changes  11/10/22 3.1  37.5 Weight loss in past month  10/12/22 2.1  25.2 No changes  09/28/22 1.7  20.0 No changes  08/31/22 2.6  31.3 No changes  08/03/22 1.9  22.9 No changes  07/06/22 2.7  32.3 No changes  06/08/22 2.3  27.9 Increased to 10 mg Wed; 7.5 mg daily ROW  05/25/22 1.9  23.2 Metformin started 5/4 04/27/22 2.2  25.9 No changes  03/30/22          2.2                   26.7     No changes  03/02/22          2.1                   25.0     No changes  02/02/22 2.1                   25.4     No changes  01/13/22          1.9                   23.3     No changes  12/03/21          2.1                               7.5 mg daily    A/P:       Anticoagulation:  Considering Mr. Taylor Bryan past history, todays findings, and per Anticoagulation Collaborative Practice Agreement/Protocol:    Fingerstick POC INR (2.3) is Therapeutic for INR goal today. Continue warfarin 10 mg Wed; 7.5 mg daily ROW  INR is 2.3 and therapeutic for INR goal 2.0-3.0. Patient reports he has restarted metformin 2000 mg per day within the past 2-3 weeks. Metformin may enhance the anticoagulant effect of warfarin. Patient denies changes to his other medications and reports consistent intake of vitamin K foods. Since INR is therapeutic, patient will continue 10 mg Wed; 7.5 mg daily ROW. Patient will recheck INR in 4 weeks. Patient missed INR appointment at [facility] on 11/29. Patient reports he had run out of warfarin and had INR checked 11/28 at [facility].  Patient instructed to contact [facility] clinic directly if he ever has difficulty obtaining warfarin refill at pharmacy. Patient verbalized understanding. Patient was instructed to schedule an appointment in 4 week(s) prior to leaving the clinic. Medication reconciliation was completed during the visit. A full discussion of the nature of anticoagulants has been carried out. A full discussion of the need for frequent and regular monitoring, precise dosage adjustment and compliance was stressed. Side effects of potential bleeding were discussed and Mr. Marroquin was instructed to call [phone] if there are any signs of abnormal bleeding. Mr. Marroquin was instructed to avoid any OTC items containing aspirin or ibuprofen and prior to starting any new OTC products to consult with his physician or pharmacist to ensure no drug interactions are present.   Mr. Marroquin was instructed to avoid any major changes in his general diet and to avoid alcohol consumption. Mr. Marroquin was provided information in the AVS that includes topics on understanding coumadin therapy, drug interaction considerations, vitamin K and coumadin use, interactions with foods and supplements containing vitamin K, and the use of herbal products. Mr. Marroquin verbalized his understanding of all instructions and will call the office with any questions, concerns, or signs of abnormal bleeding or blood clot. Notifications of recommendations will be sent to Dr. Gregg Tomas MD  for review.     Thank you,  Job Blake, PharmD      For Pharmacy Admin Tracking Only    Intervention Detail: Adherence Monitorin and Lab(s) Ordered  Total # of Interventions Recommended: 2  Total # of Interventions Accepted: 2  Time Spent (min): 20

## 2022-12-13 ENCOUNTER — ANTI-COAG VISIT (OUTPATIENT)
Dept: PHARMACY | Age: 72
End: 2022-12-13
Payer: MEDICARE

## 2022-12-13 DIAGNOSIS — Z79.01 LONG TERM (CURRENT) USE OF ANTICOAGULANTS: ICD-10-CM

## 2022-12-13 DIAGNOSIS — I48.0 PAROXYSMAL ATRIAL FIBRILLATION (HCC): Primary | ICD-10-CM

## 2022-12-13 LAB
INR BLD: 2.3
PT POC: 27.3 SECONDS
VALID INTERNAL CONTROL?: YES

## 2022-12-13 PROCEDURE — 99211 OFF/OP EST MAY X REQ PHY/QHP: CPT

## 2023-01-10 ENCOUNTER — TELEPHONE (OUTPATIENT)
Dept: PHARMACY | Age: 73
End: 2023-01-10

## 2023-03-29 ENCOUNTER — TELEPHONE (OUTPATIENT)
Dept: PHARMACY | Age: 73
End: 2023-03-29

## 2023-03-30 NOTE — PROGRESS NOTES
Pharmacy Progress Note - Anticoagulation Management    S/O:  Mr. Kameron Moss  is a 75 y.o. male seen today for anticoagulation management for the diagnosis of Atrial Fibrillation. HPI: At last visit (12/13) INR was 2.3 and therapeutic for INR goal 2.0-3.0. Patient was instructed to continue 10 mg Wed; 7.5 mg daily ROW and recheck INR in 4 weeks. Patient missed INR appt on 1/10 and message was left requesting call back. In response to warfarin refill request received on 3/29, patient was contacted again and scheduled follow up INR on 4/3. Interim History:    Warfarin start date: Prior to November 2018 per chart review  INR Goal:  2.0-3.0    Current warfarin regimen: 10 mg Wed; 7.5 mg daily ROW                Warfarin tablet strength:   5 mg   Duration of therapy: Indefinite     Today's pertinent positives includes:  No changes    Results for orders placed or performed in visit on 04/03/23   AMB POC PT/INR   Result Value Ref Range    VALID INTERNAL CONTROL POC Yes     Prothrombin time (POC) 34.8 seconds    INR POC 2.9      Adherence:   Able to recall regimen? YES  Miss/extra dose? NO  Need refill?  YES    Upcoming procedure(s):  NO    INR History:   (normal INR range 0.8-1.2)     Date   INR   PT   Dose/Comments  04/03/23 2.9  34.8 No changes  12/13/22 2.3  27.3 No changes  11/10/22 3.1  37.5 Weight loss in past month  10/12/22 2.1  25.2 No changes  09/28/22 1.7  20.0 No changes  08/31/22 2.6  31.3 No changes  08/03/22 1.9  22.9 No changes  07/06/22 2.7  32.3 No changes  06/08/22 2.3  27.9 Increased to 10 mg Wed; 7.5 mg daily ROW  05/25/22 1.9  23.2 Metformin started 5/4 04/27/22 2.2  25.9 No changes  03/30/22          2.2                   26.7     No changes  03/02/22          2.1                   25.0     No changes  02/02/22          2.1                   25.4     No changes  01/13/22          1.9                   23.3     No changes  12/03/21          2.1                               7.5 mg daily    A/P:       Anticoagulation:  Considering Mr. Siomara Malcolm past history, todays findings, and per Anticoagulation Collaborative Practice Agreement/Protocol:    Fingerstick POC INR (2.9) is Therapeutic for INR goal today. Continue warfarin 10 mg Wed; 7.5 mg daily ROW  INR is 2.9 and therapeutic for INR goal 2.0-3.0. Patient recalled warfarin plan from last visit (12/13) and denies changes to his other medications. Patient reports consistent intake of vitamin K foods. Since INR is therapeutic, patient will continue 10 mg Wed; 7.5 mg daily ROW and recheck INR in 4 weeks. Refill of warfarin 5 mg tablets e-prescribed to [address] today (4/3)    Patient was instructed to schedule an appointment in 4 week(s) prior to leaving the clinic. Medication reconciliation was completed during the visit. A full discussion of the nature of anticoagulants has been carried out. A full discussion of the need for frequent and regular monitoring, precise dosage adjustment and compliance was stressed. Side effects of potential bleeding were discussed and Mr. Abena Argueta was instructed to call [phone] if there are any signs of abnormal bleeding. Mr. Abena Argueta was instructed to avoid any OTC items containing aspirin or ibuprofen and prior to starting any new OTC products to consult with his physician or pharmacist to ensure no drug interactions are present. Mr. Abena Argueta was instructed to avoid any major changes in his general diet and to avoid alcohol consumption. Mr. Abena Argueta was provided information in the AVS that includes topics on understanding coumadin therapy, drug interaction considerations, vitamin K and coumadin use, interactions with foods and supplements containing vitamin K, and the use of herbal products. Mr. Abena Argueta verbalized his understanding of all instructions and will call the office with any questions, concerns, or signs of abnormal bleeding or blood clot.   Notifications of recommendations will be sent to Dr. Ronni Tanner MD  for review.     Thank you,  Verena Marcelino, PharmD        For Pharmacy Admin Tracking Only    Intervention Detail: Lab(s) Ordered and Refill(s) Provided   Total # of Interventions Recommended: 2  Total # of Interventions Accepted: 2  Time Spent (min): 20

## 2023-03-30 NOTE — PATIENT INSTRUCTIONS
Today your INR was 2.9. Your goal INR is  2.0-3.0. You have a  5 mg tablet of Coumadin (warfarin). Take Coumadin (warfarin) as follows: Take 10 mg (2 tablets) Wednesdays and 7.5 mg (1.5 tablets) daily rest of week    Come back in 4 week(s) for your next finger stick/INR blood test.        Avoid any over the counter items containing aspirin or ibuprofen, and avoid great swings in general diet. Avoid alcohol consumption. Please notify the INR nurse if you are started on any new medication including over the counter or herbal supplements. Also, please notify your INR nurse if any of your other prescription or over the counter medications have been discontinued. Your Care Instructions  Warfarin is a medicine that you take to prevent blood clots. It is often called a blood thinner. Doctors give warfarin (such as Coumadin) to reduce the risk of blood clots. You may be at risk for blood clots if you have atrial fibrillation or deep vein thrombosis. Some other health problems may also put you at risk. Warfarin slows the amount of time it takes for your blood to clot. It can cause bleeding problems. Even if you've been taking warfarin for a while, it's important to know how to take it safely. Foods and other medicines can affect the way warfarin works. Some can make warfarin work too well. This can cause bleeding problems. And some can make it work poorly, so that it does not prevent blood clots very well. You will need regular blood tests to check how long it takes for your blood to form a clot. This test is called a PT or prothrombin time test. The result of the test is called an INR level. Depending on the test results, your doctor or anticoagulation clinic may adjust your dose of warfarin. Follow-up care is a key part of your treatment and safety. Be sure to make and go to all appointments, and call your doctor if you are having problems.  It's also a good idea to know your test results and keep a list of the medicines you take. How can you care for yourself at home? Take warfarin safely  Take your warfarin at the same time each day. If you miss a dose of warfarin, don't take an extra dose to make up for it. Your doctor can tell you exactly what to do so you don't take too much or too little. Wear medical alert jewelry that lets others know that you take warfarin. You can buy this at most drugstores. Don't take warfarin if you are pregnant or planning to get pregnant. Talk to your doctor about how you can prevent getting pregnant while you are taking it. Don't change your dose or stop taking warfarin unless your doctor tells you to. Effects of medicines and food on warfarin  Don't start or stop taking any medicines, vitamins, or natural remedies unless you first talk to your doctor. Many medicines can affect how warfarin works. These include aspirin and other pain relievers, over-the-counter medicines, multivitamins, dietary supplements, and herbal products. Tell all of your doctors and pharmacists that you take warfarin. Some prescription medicines can affect how warfarin works. Keep the amount of vitamin K in your diet about the same from day to day. Do not suddenly eat a lot more or a lot less food that is rich in vitamin K than you usually do. Vitamin K affects how warfarin works and how your blood clots. Talk with your doctor before making big changes in your diet. Vitamin K is in many foods, such as:  Leafy greens, such as kale, cabbage, spinach, Swiss chard, and lettuce. Canola and soybean oils. Green vegetables, such as asparagus, broccoli, and [place] sprouts. Vegetable drinks, green tea leaves, and some dietary supplement drinks. Avoid cranberry juice and other cranberry products. They can increase the effects of warfarin. Limit your use of alcohol. Avoid bleeding by preventing falls and injuries  Wear slippers or shoes with nonskid soles.   Remove throw rugs and clutter. Rearrange furniture and electrical cords to keep them out of walking paths. Keep stairways, porches, and outside walkways well lit. Use night-lights in hallways and bathrooms. Be extra careful when you work with sharp tools or knives. When should you call for help? Call 911 anytime you think you may need emergency care. For example, call if:  You have a sudden, severe headache that is different from past headaches. Call your doctor now or seek immediate medical care if:  You have any abnormal bleeding, such as:  Nosebleeds. Vaginal bleeding that is different (heavier, more frequent, at a different time of the month) than what you are used to. Bloody or black stools, or rectal bleeding. Bloody or pink urine. Watch closely for changes in your health, and be sure to contact your doctor if you have any problems. Where can you learn more? Go to http://adrian-elizabeth.info/. Enter O327 in the search box to learn more about \"Taking Warfarin Safely: Care Instructions. \"  Current as of: January 27, 2016  Content Version: 11.1  © 2395-4242 [employer]. Care instructions adapted under license by [employer] (which disclaims liability or warranty for this information). If you have questions about a medical condition or this instruction, always ask your healthcare professional. [place] [address] any warranty or liability for your use of this information.

## 2023-04-03 ENCOUNTER — ANTI-COAG VISIT (OUTPATIENT)
Dept: PHARMACY | Age: 73
End: 2023-04-03
Payer: MEDICARE

## 2023-04-03 DIAGNOSIS — I48.0 PAROXYSMAL ATRIAL FIBRILLATION (HCC): Primary | ICD-10-CM

## 2023-04-03 DIAGNOSIS — Z79.01 LONG TERM (CURRENT) USE OF ANTICOAGULANTS: ICD-10-CM

## 2023-04-03 LAB
INR BLD: 2.9
PT POC: 34.8 SECONDS
VALID INTERNAL CONTROL?: YES

## 2023-04-03 PROCEDURE — 99212 OFFICE O/P EST SF 10 MIN: CPT

## 2023-04-03 RX ORDER — WARFARIN SODIUM 5 MG/1
TABLET ORAL
Qty: 150 TABLET | Refills: 0 | Status: SHIPPED
Start: 2023-04-03

## 2023-05-01 ENCOUNTER — ANTI-COAG VISIT (OUTPATIENT)
Dept: PHARMACY | Age: 73
End: 2023-05-01
Payer: MEDICARE

## 2023-05-01 DIAGNOSIS — Z79.01 LONG TERM (CURRENT) USE OF ANTICOAGULANTS: ICD-10-CM

## 2023-05-01 DIAGNOSIS — I48.0 PAROXYSMAL ATRIAL FIBRILLATION (HCC): Primary | ICD-10-CM

## 2023-05-01 LAB
INR BLD: 2.5
PT POC: 29.6 SECONDS
VALID INTERNAL CONTROL?: YES

## 2023-05-01 PROCEDURE — 99212 OFFICE O/P EST SF 10 MIN: CPT

## 2023-05-01 RX ORDER — WARFARIN SODIUM 5 MG/1
TABLET ORAL
Qty: 150 TABLET | Refills: 0 | Status: SHIPPED | OUTPATIENT
Start: 2023-05-01

## 2023-05-19 RX ORDER — OMEPRAZOLE 20 MG/1
20 CAPSULE, DELAYED RELEASE ORAL DAILY
COMMUNITY

## 2023-05-19 RX ORDER — CARVEDILOL 25 MG/1
25 TABLET ORAL 2 TIMES DAILY
COMMUNITY
Start: 2022-11-10 | End: 2023-07-21

## 2023-05-19 RX ORDER — WARFARIN SODIUM 5 MG/1
TABLET ORAL
COMMUNITY
Start: 2023-05-01

## 2023-05-19 RX ORDER — ATORVASTATIN CALCIUM 40 MG/1
80 TABLET, FILM COATED ORAL NIGHTLY
COMMUNITY
Start: 2022-09-19

## 2023-05-22 ENCOUNTER — OFFICE VISIT (OUTPATIENT)
Age: 73
End: 2023-05-22
Payer: MEDICARE

## 2023-05-22 VITALS
WEIGHT: 231 LBS | OXYGEN SATURATION: 98 % | SYSTOLIC BLOOD PRESSURE: 120 MMHG | HEIGHT: 70 IN | RESPIRATION RATE: 16 BRPM | BODY MASS INDEX: 33.07 KG/M2 | DIASTOLIC BLOOD PRESSURE: 80 MMHG | HEART RATE: 93 BPM

## 2023-05-22 DIAGNOSIS — I48.0 PAROXYSMAL ATRIAL FIBRILLATION (HCC): Primary | ICD-10-CM

## 2023-05-22 DIAGNOSIS — Z79.01 LONG TERM (CURRENT) USE OF ANTICOAGULANTS: ICD-10-CM

## 2023-05-22 DIAGNOSIS — I49.3 PVC (PREMATURE VENTRICULAR CONTRACTION): ICD-10-CM

## 2023-05-22 DIAGNOSIS — I25.10 CORONARY ARTERY DISEASE INVOLVING NATIVE CORONARY ARTERY OF NATIVE HEART WITHOUT ANGINA PECTORIS: ICD-10-CM

## 2023-05-22 DIAGNOSIS — I10 PRIMARY HYPERTENSION: ICD-10-CM

## 2023-05-22 DIAGNOSIS — E11.65 TYPE 2 DIABETES MELLITUS WITH HYPERGLYCEMIA, WITHOUT LONG-TERM CURRENT USE OF INSULIN (HCC): ICD-10-CM

## 2023-05-22 PROCEDURE — 1036F TOBACCO NON-USER: CPT | Performed by: SPECIALIST

## 2023-05-22 PROCEDURE — 1123F ACP DISCUSS/DSCN MKR DOCD: CPT | Performed by: SPECIALIST

## 2023-05-22 PROCEDURE — 2022F DILAT RTA XM EVC RTNOPTHY: CPT | Performed by: SPECIALIST

## 2023-05-22 PROCEDURE — 3074F SYST BP LT 130 MM HG: CPT | Performed by: SPECIALIST

## 2023-05-22 PROCEDURE — 3046F HEMOGLOBIN A1C LEVEL >9.0%: CPT | Performed by: SPECIALIST

## 2023-05-22 PROCEDURE — G8417 CALC BMI ABV UP PARAM F/U: HCPCS | Performed by: SPECIALIST

## 2023-05-22 PROCEDURE — G8427 DOCREV CUR MEDS BY ELIG CLIN: HCPCS | Performed by: SPECIALIST

## 2023-05-22 PROCEDURE — 99213 OFFICE O/P EST LOW 20 MIN: CPT | Performed by: SPECIALIST

## 2023-05-22 PROCEDURE — 3078F DIAST BP <80 MM HG: CPT | Performed by: SPECIALIST

## 2023-05-22 PROCEDURE — 3017F COLORECTAL CA SCREEN DOC REV: CPT | Performed by: SPECIALIST

## 2023-05-22 NOTE — PROGRESS NOTES
HISTORY OF PRESENT ILLNESS  Stanislaw Richardson is a 75 y.o. male     SUMMARY:   Patient Active Problem List   Diagnosis    Paroxysmal atrial fibrillation (HCC)    PVC (premature ventricular contraction)    HTN (hypertension)    CAD (coronary artery disease)    Severe obesity (HCC)    Hyperlipidemia    Long term (current) use of anticoagulants    Type 2 diabetes mellitus with hyperglycemia, without long-term current use of insulin (HCC)       Current Outpatient Medications   Medication Sig Dispense Refill    MAGNESIUM PO Take 400 mg by mouth      atorvastatin (LIPITOR) 40 MG tablet Take 1 tablet by mouth nightly      carvedilol (COREG) 25 MG tablet Take 1 tablet by mouth 2 times daily      omeprazole (PRILOSEC) 20 MG delayed release capsule Take 1 capsule by mouth daily      warfarin (COUMADIN) 5 MG tablet Take 10 mg (2 tablets) Wednesdays and 7.5 mg (1.5 tablets) daily rest of week or take as directed by clinic provider. 90 day supply authorized. metFORMIN (GLUCOPHAGE) 500 MG tablet Take 2 tablets by mouth 2 times daily (with meals)       No current facility-administered medications for this visit. CARDIOLOGY STUDIES TO DATE:  12/12 exercise cardiolyte apical ischemia, lvef 61%  12/12 normal echo    Chief Complaint   Patient presents with    Follow-up       HPI :  Cardiac wise he is doing really well with no complaints or problems with his medications. He has been out of his metformin for about 3 months and his sugars are running quite high. Looked at his feet today and he needs to see the applied diet wrist.  He is not really exercising because of a lot of joint pains but in spite of that he has lost weight deliberately. He is keeping up with his Coumadin checks.     CARDIAC ROS:   negative    Family History   Problem Relation Age of Onset    Heart Disease Father        Past Medical History:   Diagnosis Date    Diverticulosis     Duodenal ulcer        GENERAL ROS:  A comprehensive review of

## 2023-05-22 NOTE — PATIENT INSTRUCTIONS
Please obtain fasting labwork      We have referred you to an endocrinologist. If you dont hear from them within 1 week, please call them to schedule an appointment:    [facility]  [address], [address]  [phone]

## 2023-05-23 DIAGNOSIS — I48.0 PAROXYSMAL ATRIAL FIBRILLATION (HCC): ICD-10-CM

## 2023-05-23 DIAGNOSIS — I10 PRIMARY HYPERTENSION: ICD-10-CM

## 2023-05-23 DIAGNOSIS — I25.10 CORONARY ARTERY DISEASE INVOLVING NATIVE CORONARY ARTERY OF NATIVE HEART WITHOUT ANGINA PECTORIS: ICD-10-CM

## 2023-05-23 DIAGNOSIS — I49.3 PVC (PREMATURE VENTRICULAR CONTRACTION): ICD-10-CM

## 2023-05-23 DIAGNOSIS — E11.65 TYPE 2 DIABETES MELLITUS WITH HYPERGLYCEMIA, WITHOUT LONG-TERM CURRENT USE OF INSULIN (HCC): ICD-10-CM

## 2023-05-23 DIAGNOSIS — Z79.01 LONG TERM (CURRENT) USE OF ANTICOAGULANTS: ICD-10-CM

## 2023-05-24 ENCOUNTER — TELEPHONE (OUTPATIENT)
Age: 73
End: 2023-05-24

## 2023-05-24 DIAGNOSIS — E78.2 MIXED HYPERLIPIDEMIA: Primary | ICD-10-CM

## 2023-05-24 LAB
ALBUMIN SERPL-MCNC: 3.8 G/DL (ref 3.5–5)
ALBUMIN/GLOB SERPL: 1.1 (ref 1.1–2.2)
ALP SERPL-CCNC: 79 U/L (ref 45–117)
ALT SERPL-CCNC: 20 U/L (ref 12–78)
ANION GAP SERPL CALC-SCNC: 13 MMOL/L (ref 5–15)
AST SERPL-CCNC: 11 U/L (ref 15–37)
BILIRUB SERPL-MCNC: 0.8 MG/DL (ref 0.2–1)
BUN SERPL-MCNC: 21 MG/DL (ref 6–20)
BUN/CREAT SERPL: 25 (ref 12–20)
CALCIUM SERPL-MCNC: 9.5 MG/DL (ref 8.5–10.1)
CHLORIDE SERPL-SCNC: 103 MMOL/L (ref 97–108)
CHOLEST SERPL-MCNC: 222 MG/DL
CO2 SERPL-SCNC: 21 MMOL/L (ref 21–32)
CREAT SERPL-MCNC: 0.85 MG/DL (ref 0.7–1.3)
EST. AVERAGE GLUCOSE BLD GHB EST-MCNC: 286 MG/DL
GLOBULIN SER CALC-MCNC: 3.5 G/DL (ref 2–4)
GLUCOSE SERPL-MCNC: 338 MG/DL (ref 65–100)
HBA1C MFR BLD: 11.6 % (ref 4–5.6)
HDLC SERPL-MCNC: 39 MG/DL
HDLC SERPL: 5.7 (ref 0–5)
LDLC SERPL CALC-MCNC: 135.6 MG/DL (ref 0–100)
POTASSIUM SERPL-SCNC: 4.6 MMOL/L (ref 3.5–5.1)
PROT SERPL-MCNC: 7.3 G/DL (ref 6.4–8.2)
SODIUM SERPL-SCNC: 137 MMOL/L (ref 136–145)
TRIGL SERPL-MCNC: 237 MG/DL
VLDLC SERPL CALC-MCNC: 47.4 MG/DL

## 2023-05-24 NOTE — TELEPHONE ENCOUNTER
----- Message from [address] [facility] [address], [place] sent at 5/24/2023  8:42 AM EDT -----  Glucose definaely out of control and bad chol is up, double lipitor and recheck around time of next appt.

## 2023-05-30 ENCOUNTER — ANTI-COAG VISIT (OUTPATIENT)
Facility: HOSPITAL | Age: 73
End: 2023-05-30

## 2023-05-30 DIAGNOSIS — Z79.01 LONG TERM (CURRENT) USE OF ANTICOAGULANTS: ICD-10-CM

## 2023-05-30 DIAGNOSIS — I48.0 PAROXYSMAL ATRIAL FIBRILLATION (HCC): Primary | ICD-10-CM

## 2023-05-30 LAB — INTERNATIONAL NORMALIZATION RATIO, POC: 2.2

## 2023-07-05 ENCOUNTER — ANTI-COAG VISIT (OUTPATIENT)
Facility: HOSPITAL | Age: 73
End: 2023-07-05
Payer: MEDICARE

## 2023-07-05 DIAGNOSIS — I48.0 PAROXYSMAL ATRIAL FIBRILLATION (HCC): Primary | ICD-10-CM

## 2023-07-05 DIAGNOSIS — Z79.01 LONG TERM (CURRENT) USE OF ANTICOAGULANTS: ICD-10-CM

## 2023-07-05 LAB — INTERNATIONAL NORMALIZATION RATIO, POC: 1.8

## 2023-07-05 PROCEDURE — 99212 OFFICE O/P EST SF 10 MIN: CPT

## 2023-07-05 PROCEDURE — 85610 PROTHROMBIN TIME: CPT

## 2023-07-21 DIAGNOSIS — I48.0 PAROXYSMAL ATRIAL FIBRILLATION (HCC): Primary | ICD-10-CM

## 2023-07-21 DIAGNOSIS — I25.10 CORONARY ARTERY DISEASE INVOLVING NATIVE CORONARY ARTERY OF NATIVE HEART WITHOUT ANGINA PECTORIS: ICD-10-CM

## 2023-07-21 RX ORDER — CARVEDILOL 25 MG/1
TABLET ORAL
Qty: 180 TABLET | Refills: 3 | Status: SHIPPED | OUTPATIENT
Start: 2023-07-21

## 2023-07-21 NOTE — TELEPHONE ENCOUNTER
Requested Prescriptions     Signed Prescriptions Disp Refills    carvedilol (COREG) 25 MG tablet 180 tablet 3     Sig: Take 1 tablet by mouth twice daily     Authorizing Provider: Timothy Wilkerson     Ordering User: Khadar Ramirez Going verbal order.

## 2023-07-21 NOTE — PROGRESS NOTES
Pharmacy Progress Note - Anticoagulation Management      S/O:  Mr. Kayleigh Nunn   is a 75 y.o. male seen today  for anticoagulation management for the diagnosis of Atrial Fibrillation. HPI: At last visit (7/5) INR was 1.8 and subtherapeutic for INR goal 2.0-3.0. Weekly planned dose was increased from 55 mg to 57.5 mg (5%). Patient recommended to take 10 mg Wed, Sat; 7.5 mg daily ROW and recheck INR in 2 weeks. Patient scheduled follow up INR on 7/19. Interim History:       Warfarin start date: Prior to November 2018 per chart review    INR Goal:  2.0-3.0      Current warfarin regimen: 10 mg Wed, Sat; 7.5 mg daily ROW                  Warfarin tablet strength:   5 mg     Duration of therapy: Indefinite     Today's pertinent positives includes:  Change in diet/appetite    Patient reports eating fewer greens over the previous 1 to 2 weeks. INR 3.4  PT 40.8    Adherence:   Able to recall regimen? YES  Miss/extra dose? NO  Need refill?  NO    Upcoming procedure(s):  NO     INR History:              (normal INR range 0.8-1.2)       Date               INR                  PT        Dose/Comments  07/26/23 3.4  40.8  Increased to 10 mg Wed, Sat; 7.5 mg daily ROW  07/05/23 1.8  21.7  No changes  05/30/23  2.2  26.8  Atorvastatin dose increased, metformin restarted  05/01/23         2.5                   29.6     No changes  04/03/23         2.9                   34.8     No changes  12/13/22         2.3                   27.3     No changes  11/10/22         3.1                   37.5     Weight loss in past month  10/12/22         2.1                   25.2     No changes  09/28/22         1.7                   20.0     No changes  08/31/22         2.6                   31.3     No changes   08/03/22         1.9                   22.9     No changes  07/06/22         2.7                   32.3     No changes  06/08/22         2.3                   27.9     Increased to 10 mg Wed; 7.5 mg daily

## 2023-07-21 NOTE — PATIENT INSTRUCTIONS
Today your INR was 3.4. Your goal INR is  2.0-3.0. You have a   5 mg tablet of Coumadin (warfarin). Take Coumadin (warfarin) as follows: Take 10 mg (2 tab) Saturday and 7.5 mg (1.5 tab) daily rest of week    Come back in 2 week(s) for your next finger stick/INR blood test.        Avoid any over the counter items containing aspirin or ibuprofen, and avoid great swings in general diet. Avoid alcohol consumption. Please notify the INR nurse if you are started on any new medication including over the counter or herbal supplements. Also, please notify your INR nurse if any of your other prescription or over the counter medications have been discontinued. Your Care Instructions  Warfarin is a medicine that you take to prevent blood clots. It is often called a blood thinner. Doctors give warfarin (such as Coumadin) to reduce the risk of blood clots. You may be at risk for blood clots if you have atrial fibrillation or deep vein thrombosis. Some other health problems may also put you at risk. Warfarin slows the amount of time it takes for your blood to clot. It can cause bleeding problems. Even if you've been taking warfarin for a while, it's important to know how to take it safely. Foods and other medicines can affect the way warfarin works. Some can make warfarin work too well. This can cause bleeding problems. And some can make it work poorly, so that it does not prevent blood clots very well. You will need regular blood tests to check how long it takes for your blood to form a clot. This test is called a PT or prothrombin time test. The result of the test is called an INR level. Depending on the test results, your doctor or anticoagulation clinic may adjust your dose of warfarin. Follow-up care is a key part of your treatment and safety. Be sure to make and go to all appointments, and call your doctor if you are having problems.  It's also a good idea to know your test results and keep a list of

## 2023-07-24 ENCOUNTER — TELEPHONE (OUTPATIENT)
Age: 73
End: 2023-07-24

## 2023-07-24 DIAGNOSIS — E78.2 MIXED HYPERLIPIDEMIA: Primary | ICD-10-CM

## 2023-07-24 NOTE — TELEPHONE ENCOUNTER
Pt needs a refill on Atorvastatin 80 mg, but pt states that [facility] said that it may need to be 40mg 2x daily as they do not have 80 mg tablets. [address]      Pt also needs a refill of Warfarin sent to [facility].        Call back: [phone]

## 2023-07-25 RX ORDER — WARFARIN SODIUM 5 MG/1
TABLET ORAL
Qty: 160 TABLET | Refills: 0 | Status: SHIPPED | OUTPATIENT
Start: 2023-07-25

## 2023-07-25 RX ORDER — ATORVASTATIN CALCIUM 40 MG/1
80 TABLET, FILM COATED ORAL NIGHTLY
Qty: 180 TABLET | Refills: 1 | Status: SHIPPED | OUTPATIENT
Start: 2023-07-25

## 2023-07-25 NOTE — TELEPHONE ENCOUNTER
Requested Prescriptions     Signed Prescriptions Disp Refills    atorvastatin (LIPITOR) 40 MG tablet 180 tablet 1     Sig: Take 2 tablets by mouth nightly     Authorizing Provider: Reese Shields     Ordering User: Jessie Rossi    Per Dr. Javier Freeman verbal order. Refill request for warfarin sent to [facility], [facility] (sepearate refill encounter).

## 2023-07-26 ENCOUNTER — ANTI-COAG VISIT (OUTPATIENT)
Facility: HOSPITAL | Age: 73
End: 2023-07-26
Payer: MEDICARE

## 2023-07-26 DIAGNOSIS — I48.0 PAROXYSMAL ATRIAL FIBRILLATION (HCC): Primary | ICD-10-CM

## 2023-07-26 DIAGNOSIS — Z79.01 LONG TERM (CURRENT) USE OF ANTICOAGULANTS: ICD-10-CM

## 2023-07-26 LAB — INTERNATIONAL NORMALIZATION RATIO, POC: 3.4

## 2023-07-26 PROCEDURE — 99212 OFFICE O/P EST SF 10 MIN: CPT

## 2023-07-26 PROCEDURE — 85610 PROTHROMBIN TIME: CPT

## 2023-08-14 NOTE — PROGRESS NOTES
Pharmacy Progress Note - Anticoagulation Management      S/O:  Mr. Cj Briscoe   is a 75 y.o. male seen today  for anticoagulation management for the diagnosis of Atrial Fibrillation. HPI: At last visit (7/26) INR was  3.4 and supratherapeutic for INR goal 2.0-3.0. Weekly planned dose was reduced from 57.5 mg to 55 mg (4%). Patient recommended to take 10 mg Sat; 7.5 mg daily ROW and recheck INR in 2 weeks. Patient scheduled follow up INR on 8/16. Interim History:       Warfarin start date: Prior to November 2018 per chart review    INR Goal:  2.0-3.0      Current warfarin regimen: 10 mg Sat; 7.5 mg daily ROW                  Warfarin tablet strength:   5 mg     Duration of therapy: Indefinite     Today's pertinent positives includes:  No significant changes since last visit    INR 2.7  PT 32.7    Adherence:   Able to recall regimen? NO  Miss/extra dose? NO  Need refill? NO    Patient reports taking 10 mg on Wednesdays since last visit (7/26) not Saturdays as directed. Patient reports taking 7.5 mg daily rest of week as directed.      Upcoming procedure(s):  NO     INR History:              (normal INR range 0.8-1.2)       Date               INR                  PT        Dose/Comments  08/16/23 2.7  32.7  10 mg Wed; 7.5 mg daily ROW  07/26/23 3.4  40.8  Increased to 10 mg Wed, Sat; 7.5 mg daily ROW  07/05/23 1.8  21.7  No changes  05/30/23  2.2  26.8  Atorvastatin dose increased, metformin restarted  05/01/23         2.5                   29.6     No changes  04/03/23         2.9                   34.8     No changes  12/13/22         2.3                   27.3     No changes  11/10/22         3.1                   37.5     Weight loss in past month  10/12/22         2.1                   25.2     No changes  09/28/22         1.7                   20.0     No changes  08/31/22         2.6                   31.3     No changes   08/03/22         1.9                   22.9     No changes  07/06/22

## 2023-08-16 ENCOUNTER — ANTI-COAG VISIT (OUTPATIENT)
Facility: HOSPITAL | Age: 73
End: 2023-08-16
Payer: MEDICARE

## 2023-08-16 DIAGNOSIS — Z79.01 LONG TERM (CURRENT) USE OF ANTICOAGULANTS: ICD-10-CM

## 2023-08-16 DIAGNOSIS — I48.0 PAROXYSMAL ATRIAL FIBRILLATION (HCC): Primary | ICD-10-CM

## 2023-08-16 LAB — INTERNATIONAL NORMALIZATION RATIO, POC: 2.7

## 2023-08-16 PROCEDURE — 99211 OFF/OP EST MAY X REQ PHY/QHP: CPT

## 2023-08-16 PROCEDURE — 85610 PROTHROMBIN TIME: CPT

## 2023-09-01 ENCOUNTER — OFFICE VISIT (OUTPATIENT)
Age: 73
End: 2023-09-01
Payer: MEDICARE

## 2023-09-01 VITALS
SYSTOLIC BLOOD PRESSURE: 131 MMHG | HEART RATE: 103 BPM | WEIGHT: 241.4 LBS | TEMPERATURE: 97.9 F | RESPIRATION RATE: 20 BRPM | HEIGHT: 70 IN | OXYGEN SATURATION: 97 % | DIASTOLIC BLOOD PRESSURE: 73 MMHG | BODY MASS INDEX: 34.56 KG/M2

## 2023-09-01 DIAGNOSIS — I10 PRIMARY HYPERTENSION: ICD-10-CM

## 2023-09-01 DIAGNOSIS — E11.65 TYPE 2 DIABETES MELLITUS WITH HYPERGLYCEMIA, WITHOUT LONG-TERM CURRENT USE OF INSULIN (HCC): Primary | ICD-10-CM

## 2023-09-01 DIAGNOSIS — E78.2 MIXED HYPERLIPIDEMIA: ICD-10-CM

## 2023-09-01 LAB — HBA1C MFR BLD: 7.4 %

## 2023-09-01 PROCEDURE — 3017F COLORECTAL CA SCREEN DOC REV: CPT | Performed by: INTERNAL MEDICINE

## 2023-09-01 PROCEDURE — 3046F HEMOGLOBIN A1C LEVEL >9.0%: CPT | Performed by: INTERNAL MEDICINE

## 2023-09-01 PROCEDURE — 3078F DIAST BP <80 MM HG: CPT | Performed by: INTERNAL MEDICINE

## 2023-09-01 PROCEDURE — 1036F TOBACCO NON-USER: CPT | Performed by: INTERNAL MEDICINE

## 2023-09-01 PROCEDURE — 99204 OFFICE O/P NEW MOD 45 MIN: CPT | Performed by: INTERNAL MEDICINE

## 2023-09-01 PROCEDURE — G8417 CALC BMI ABV UP PARAM F/U: HCPCS | Performed by: INTERNAL MEDICINE

## 2023-09-01 PROCEDURE — 1123F ACP DISCUSS/DSCN MKR DOCD: CPT | Performed by: INTERNAL MEDICINE

## 2023-09-01 PROCEDURE — 83036 HEMOGLOBIN GLYCOSYLATED A1C: CPT | Performed by: INTERNAL MEDICINE

## 2023-09-01 PROCEDURE — 3075F SYST BP GE 130 - 139MM HG: CPT | Performed by: INTERNAL MEDICINE

## 2023-09-01 PROCEDURE — G8427 DOCREV CUR MEDS BY ELIG CLIN: HCPCS | Performed by: INTERNAL MEDICINE

## 2023-09-01 PROCEDURE — 2022F DILAT RTA XM EVC RTNOPTHY: CPT | Performed by: INTERNAL MEDICINE

## 2023-09-01 RX ORDER — METFORMIN HYDROCHLORIDE 500 MG/1
1000 TABLET, EXTENDED RELEASE ORAL 2 TIMES DAILY
Qty: 120 TABLET | Refills: 5 | Status: SHIPPED | OUTPATIENT
Start: 2023-09-01

## 2023-09-01 NOTE — PATIENT INSTRUCTIONS
Start Ozempic 0.25 mg once a week for 4 weeks, then increase to 0.5 mg once a week     Take Metformin 1000 mg twice a day, I have sent a new prescription for the long acting metformin which should help with gastrointestinal symptoms    Check glucose before breakfast and few times per week before bedtime

## 2023-09-01 NOTE — ASSESSMENT & PLAN NOTE
A1c now at 7.4% (down from of 11.6% 5/2023, at 6.6% in 2020). Creatinine 0.85. Normal liver function. I reviewed provider notes, prior lab work.     - change metformin to XR 1000 mg BID   - start ozempic 0.25 mg qweek x 4 wks, then 0.5 mg qweek     -goal HbA1c is 7% without frequent lows  -to do SMBG before Br and HS, goal BS is   -to call if BS is <70 or >300  -bring log and meter to each visit  -discussed diet modifications and to receive 150 min of exercise if possible per week of moderate intensity  -counseled patient on long term disease complications including but not limited to blindness, neuropathy, PVD/PAD, CAD and heart disease, MI/Death, renal insufficiency and/or renal failure    DM HM:  -ophtho: advised annual eval, no reports available today   -renal: check urine microalb   -HLD: on statin per PCP   -feet: overgrown nails, referral podiatry

## 2023-09-01 NOTE — ASSESSMENT & PLAN NOTE
The 10-year ASCVD risk score (Meghan HUGGINS, et al., 2019) is: 45.8%    Values used to calculate the score:      Age: 75 years      Sex: Male      Is Non- : No      Diabetic: Yes      Tobacco smoker: No      Systolic Blood Pressure: 647 mmHg      Is BP treated: Yes      HDL Cholesterol: 39 MG/DL      Total Cholesterol: 222 MG/DL    Continue high intensity statin therapy  Last LFTS WNL

## 2023-09-01 NOTE — PROGRESS NOTES
Lester Carlos is a 75 y.o. male here for   Chief Complaint   Patient presents with    New Patient     Referrred for DM       1. Have you been to the ER, urgent care clinic since your last visit? Hospitalized since your last visit? -n/a    2. Have you seen or consulted any other health care providers outside of the [address] [place] [address] Avenue since your last visit?   Include any pap smears or colon screening.-n/a
10-year ASCVD risk score (Meghan HUGGINS, et al., 2019) is: 45.8%    Values used to calculate the score:      Age: 75 years      Sex: Male      Is Non- : No      Diabetic: Yes      Tobacco smoker: No      Systolic Blood Pressure: 414 mmHg      Is BP treated: Yes      HDL Cholesterol: 39 MG/DL      Total Cholesterol: 222 MG/DL    Continue high intensity statin therapy  Last LFTS WNL   3. Primary hypertension  Assessment & Plan:   Controlled, continue primary care follow up     Return in about 3 months (around 12/1/2023). Thank you for allowing me to participate in the care of this patient. On this date 9/1/2023 I have spent 51 minutes reviewing previous notes, test results and face to face with the patient discussing the diagnosis and importance of compliance with the treatment plan as well as documenting on the day of the visit.

## 2023-09-02 LAB
CREAT UR-MCNC: 175 MG/DL
MICROALBUMIN UR-MCNC: 79 MG/DL
MICROALBUMIN/CREAT UR-RTO: 451 MG/G (ref 0–30)

## 2023-09-21 NOTE — PATIENT INSTRUCTIONS
Today your INR was 2.2. Your goal INR is  2.0-3.0. You have a   5 mg tablet of Coumadin (warfarin). Take Coumadin (warfarin) as follows: Take 10 mg (2 tab) Wednesday and 7.5 mg (1.5 tab) daily rest of week    Come back in 4 week(s) for your next finger stick/INR blood test.        Avoid any over the counter items containing aspirin or ibuprofen, and avoid great swings in general diet. Avoid alcohol consumption. Please notify the INR nurse if you are started on any new medication including over the counter or herbal supplements. Also, please notify your INR nurse if any of your other prescription or over the counter medications have been discontinued. Your Care Instructions  Warfarin is a medicine that you take to prevent blood clots. It is often called a blood thinner. Doctors give warfarin (such as Coumadin) to reduce the risk of blood clots. You may be at risk for blood clots if you have atrial fibrillation or deep vein thrombosis. Some other health problems may also put you at risk. Warfarin slows the amount of time it takes for your blood to clot. It can cause bleeding problems. Even if you've been taking warfarin for a while, it's important to know how to take it safely. Foods and other medicines can affect the way warfarin works. Some can make warfarin work too well. This can cause bleeding problems. And some can make it work poorly, so that it does not prevent blood clots very well. You will need regular blood tests to check how long it takes for your blood to form a clot. This test is called a PT or prothrombin time test. The result of the test is called an INR level. Depending on the test results, your doctor or anticoagulation clinic may adjust your dose of warfarin. Follow-up care is a key part of your treatment and safety. Be sure to make and go to all appointments, and call your doctor if you are having problems.  It's also a good idea to know your test results and keep a list of

## 2023-09-21 NOTE — PROGRESS NOTES
Pharmacy Progress Note - Anticoagulation Management      S/O:  Mr. Teagan Barnard   is a 75 y.o. male seen today  for anticoagulation management for the diagnosis of Atrial Fibrillation. HPI: At last visit (8/16) INR was 2.7 and therapeutic for INR goal 2.0-3.0. Patient recommended to continue 10 mg Wed; 7.5 mg daily and recheck INR in 4 weeks. Patient scheduled follow up INR on 9/20 at 10 AM.  Patient missed INR appointment on 9/20 and rescheduled to 9/27. Interim History:       Warfarin start date: Prior to November 2018 per chart review    INR Goal:  2.0-3.0      Current warfarin regimen: 10 mg Wed; 7.5 mg daily ROW                  Warfarin tablet strength:   5 mg     Duration of therapy: Indefinite     Today's pertinent positives includes:  No significant changes since last visit    INR 2.2  PT 26.9    Adherence:   Able to recall regimen? YES  Miss/extra dose? NO  Need refill?  NO    Upcoming procedure(s):  NO     INR History:              (normal INR range 0.8-1.2)       Date               INR                  PT        Dose/Comments  09/27/23 2.2  26.9  No changes  08/16/23 2.7  32.7  10 mg Wed; 7.5 mg daily ROW  07/26/23 3.4  40.8  Increased to 10 mg Wed, Sat; 7.5 mg daily ROW  07/05/23 1.8  21.7  No changes  05/30/23  2.2  26.8  Atorvastatin dose increased, metformin restarted  05/01/23         2.5                   29.6     No changes  04/03/23         2.9                   34.8     No changes  12/13/22         2.3                   27.3     No changes  11/10/22         3.1                   37.5     Weight loss in past month  10/12/22         2.1                   25.2     No changes  09/28/22         1.7                   20.0     No changes  08/31/22         2.6                   31.3     No changes   08/03/22         1.9                   22.9     No changes  07/06/22         2.7                   32.3     No changes  06/08/22         2.3                   27.9     Increased to 10 mg Wed;

## 2023-09-27 ENCOUNTER — ANTI-COAG VISIT (OUTPATIENT)
Facility: HOSPITAL | Age: 73
End: 2023-09-27
Payer: MEDICARE

## 2023-09-27 DIAGNOSIS — Z79.01 LONG TERM (CURRENT) USE OF ANTICOAGULANTS: ICD-10-CM

## 2023-09-27 DIAGNOSIS — I48.0 PAROXYSMAL ATRIAL FIBRILLATION (HCC): Primary | ICD-10-CM

## 2023-09-27 LAB — INTERNATIONAL NORMALIZATION RATIO, POC: 2.2

## 2023-09-27 PROCEDURE — 85610 PROTHROMBIN TIME: CPT

## 2023-09-27 PROCEDURE — 99211 OFF/OP EST MAY X REQ PHY/QHP: CPT

## 2023-10-19 NOTE — PROGRESS NOTES
Pharmacy Progress Note - Anticoagulation Management      S/O:  Mr. Mercy Lopez   is a 75 y.o. male seen today  for anticoagulation management for the diagnosis of Atrial Fibrillation. HPI: At last visit (9/27) INR was 2.2 and therapeutic for INR goal 2.0-3.0. Patient recommended to continue 10 mg Wed; 7.5 mg daily ROW and recheck INR in 4 weeks. Interim History:       Warfarin start date: Prior to November 2018 per chart review    INR Goal:  2.0-3.0      Current warfarin regimen: 10 mg Wed; 7.5 mg daily ROW                  Warfarin tablet strength:   5 mg     Duration of therapy: Indefinite     Today's pertinent positives includes:  Medication change    Patient reports he has resumed taking metformin twice daily since last visit (9/27)    INR 1.7  PT 20.6    Adherence:   Able to recall regimen? YES  Miss/extra dose? NO  Need refill?  NO    Upcoming procedure(s):  NO     INR History:              (normal INR range 0.8-1.2)       Date               INR                  PT        Dose/Comments  10/25/23 1.7  20.6  Metformin resumed  09/27/23 2.2  26.9  No changes  08/16/23 2.7  32.7  10 mg Wed; 7.5 mg daily ROW  07/26/23 3.4  40.8  Increased to 10 mg Wed, Sat; 7.5 mg daily ROW  07/05/23 1.8  21.7  No changes  05/30/23  2.2  26.8  Atorvastatin dose increased, metformin restarted  05/01/23         2.5                   29.6     No changes  04/03/23         2.9                   34.8     No changes  12/13/22         2.3                   27.3     No changes  11/10/22         3.1                   37.5     Weight loss in past month  10/12/22         2.1                   25.2     No changes  09/28/22         1.7                   20.0     No changes  08/31/22         2.6                   31.3     No changes   08/03/22         1.9                   22.9     No changes  07/06/22         2.7                   32.3     No changes  06/08/22         2.3                   27.9     Increased to 10 mg Wed; 7.5 mg

## 2023-10-19 NOTE — PATIENT INSTRUCTIONS
Today your INR was 1.7. Your goal INR is  2.0-3.0. You have a   5 mg tablet of Coumadin (warfarin). Take Coumadin (warfarin) as follows: Take 10 mg (2 tab) Wednesday, Saturday and 7.5 mg (1.5 tab) daily rest of week    Come back in 2 week(s) for your next finger stick/INR blood test.        Avoid any over the counter items containing aspirin or ibuprofen, and avoid great swings in general diet. Avoid alcohol consumption. Please notify the INR nurse if you are started on any new medication including over the counter or herbal supplements. Also, please notify your INR nurse if any of your other prescription or over the counter medications have been discontinued. Your Care Instructions  Warfarin is a medicine that you take to prevent blood clots. It is often called a blood thinner. Doctors give warfarin (such as Coumadin) to reduce the risk of blood clots. You may be at risk for blood clots if you have atrial fibrillation or deep vein thrombosis. Some other health problems may also put you at risk. Warfarin slows the amount of time it takes for your blood to clot. It can cause bleeding problems. Even if you've been taking warfarin for a while, it's important to know how to take it safely. Foods and other medicines can affect the way warfarin works. Some can make warfarin work too well. This can cause bleeding problems. And some can make it work poorly, so that it does not prevent blood clots very well. You will need regular blood tests to check how long it takes for your blood to form a clot. This test is called a PT or prothrombin time test. The result of the test is called an INR level. Depending on the test results, your doctor or anticoagulation clinic may adjust your dose of warfarin. Follow-up care is a key part of your treatment and safety. Be sure to make and go to all appointments, and call your doctor if you are having problems.  It's also a good idea to know your test results and keep

## 2023-10-25 ENCOUNTER — ANTI-COAG VISIT (OUTPATIENT)
Facility: HOSPITAL | Age: 73
End: 2023-10-25
Payer: MEDICARE

## 2023-10-25 DIAGNOSIS — Z79.01 LONG TERM (CURRENT) USE OF ANTICOAGULANTS: ICD-10-CM

## 2023-10-25 DIAGNOSIS — I48.0 PAROXYSMAL ATRIAL FIBRILLATION (HCC): Primary | ICD-10-CM

## 2023-10-25 LAB — INTERNATIONAL NORMALIZATION RATIO, POC: 1.7

## 2023-10-25 PROCEDURE — 85610 PROTHROMBIN TIME: CPT

## 2023-10-25 PROCEDURE — 99212 OFFICE O/P EST SF 10 MIN: CPT

## 2023-10-25 RX ORDER — WARFARIN SODIUM 5 MG/1
TABLET ORAL
Qty: 160 TABLET | Refills: 0 | Status: SHIPPED | OUTPATIENT
Start: 2023-10-25

## 2023-11-07 NOTE — PROGRESS NOTES
Pharmacy Progress Note - Anticoagulation Management      S/O:  Mr. Dayana Lozada   is a 75 y.o. male seen today  for anticoagulation management for the diagnosis of Atrial Fibrillation. HPI: At last visit (10/25) INR was 1.7 and subtherapeutic for INR goal 2.0-3.0. Weekly planned warfarin dose was increased from 55 mg to 57.5 mg (5%). Patient recommended to take 10 mg Wed, Sat; 7.5 mg daily ROW and recheck INR in 2 weeks. Interim History:       Warfarin start date: Prior to November 2018 per chart review    INR Goal:  2.0-3.0      Current warfarin regimen: 10 mg Wed, Sat; 7.5 mg daily ROW                  Warfarin tablet strength:   5 mg     Duration of therapy: Indefinite     Today's pertinent positives includes:  No significant changes since last visit    INR 2.9  PT 34.3    Adherence:   Able to recall regimen? YES  Miss/extra dose? NO  Need refill?  NO    Upcoming procedure(s):  NO     INR History:              (normal INR range 0.8-1.2)       Date               INR                  PT        Dose/Comments  11/08/23 2.9  34.3  Increased to 10 mg Wed, Sat; 7.5 mg daily ROW  10/25/23 1.7  20.6  Metformin resumed  09/27/23 2.2  26.9  No changes  08/16/23 2.7  32.7  10 mg Wed; 7.5 mg daily ROW  07/26/23 3.4  40.8  Increased to 10 mg Wed, Sat; 7.5 mg daily ROW  07/05/23 1.8  21.7  No changes  05/30/23  2.2  26.8  Atorvastatin dose increased, metformin restarted  05/01/23         2.5                   29.6     No changes  04/03/23         2.9                   34.8     No changes  12/13/22         2.3                   27.3     No changes  11/10/22         3.1                   37.5     Weight loss in past month  10/12/22         2.1                   25.2     No changes  09/28/22         1.7                   20.0     No changes  08/31/22         2.6                   31.3     No changes   08/03/22         1.9                   22.9     No changes  07/06/22         2.7                   32.3     No

## 2023-11-08 ENCOUNTER — ANTI-COAG VISIT (OUTPATIENT)
Facility: HOSPITAL | Age: 73
End: 2023-11-08
Payer: MEDICARE

## 2023-11-08 DIAGNOSIS — I48.0 PAROXYSMAL ATRIAL FIBRILLATION (HCC): Primary | ICD-10-CM

## 2023-11-08 DIAGNOSIS — Z79.01 LONG TERM (CURRENT) USE OF ANTICOAGULANTS: ICD-10-CM

## 2023-11-08 LAB — INTERNATIONAL NORMALIZATION RATIO, POC: 2.9

## 2023-11-08 PROCEDURE — 85610 PROTHROMBIN TIME: CPT

## 2023-11-08 PROCEDURE — 99211 OFF/OP EST MAY X REQ PHY/QHP: CPT

## 2023-11-28 ENCOUNTER — OFFICE VISIT (OUTPATIENT)
Age: 73
End: 2023-11-28
Payer: MEDICARE

## 2023-11-28 VITALS
BODY MASS INDEX: 37.22 KG/M2 | HEART RATE: 91 BPM | HEIGHT: 70 IN | OXYGEN SATURATION: 97 % | DIASTOLIC BLOOD PRESSURE: 72 MMHG | WEIGHT: 260 LBS | SYSTOLIC BLOOD PRESSURE: 126 MMHG

## 2023-11-28 DIAGNOSIS — I49.3 PVC (PREMATURE VENTRICULAR CONTRACTION): ICD-10-CM

## 2023-11-28 DIAGNOSIS — I10 PRIMARY HYPERTENSION: ICD-10-CM

## 2023-11-28 DIAGNOSIS — I48.0 PAROXYSMAL ATRIAL FIBRILLATION (HCC): Primary | ICD-10-CM

## 2023-11-28 DIAGNOSIS — I25.10 CORONARY ARTERY DISEASE INVOLVING NATIVE CORONARY ARTERY OF NATIVE HEART WITHOUT ANGINA PECTORIS: ICD-10-CM

## 2023-11-28 DIAGNOSIS — E78.2 MIXED HYPERLIPIDEMIA: ICD-10-CM

## 2023-11-28 PROCEDURE — G8427 DOCREV CUR MEDS BY ELIG CLIN: HCPCS | Performed by: SPECIALIST

## 2023-11-28 PROCEDURE — 3074F SYST BP LT 130 MM HG: CPT | Performed by: SPECIALIST

## 2023-11-28 PROCEDURE — G8417 CALC BMI ABV UP PARAM F/U: HCPCS | Performed by: SPECIALIST

## 2023-11-28 PROCEDURE — 1123F ACP DISCUSS/DSCN MKR DOCD: CPT | Performed by: SPECIALIST

## 2023-11-28 PROCEDURE — 99214 OFFICE O/P EST MOD 30 MIN: CPT | Performed by: SPECIALIST

## 2023-11-28 PROCEDURE — 1036F TOBACCO NON-USER: CPT | Performed by: SPECIALIST

## 2023-11-28 PROCEDURE — 3017F COLORECTAL CA SCREEN DOC REV: CPT | Performed by: SPECIALIST

## 2023-11-28 PROCEDURE — 3078F DIAST BP <80 MM HG: CPT | Performed by: SPECIALIST

## 2023-11-28 PROCEDURE — G8484 FLU IMMUNIZE NO ADMIN: HCPCS | Performed by: SPECIALIST

## 2023-11-30 NOTE — PROGRESS NOTES
No changes  06/08/22         2.3                   27.9     Increased to 10 mg Wed; 7.5 mg daily ROW  05/25/22         1.9                   23.2     Metformin started 5/4 04/27/22         2.2                   25.9     No changes  03/30/22          2.2                   26.7     No changes  03/02/22          2.1                   25.0     No changes  02/02/22          2.1                   25.4     No changes  01/13/22          1.9                   23.3     No changes  12/03/21          2.1                               7.5 mg daily      A/P:         Anticoagulation:   Considering Mr. Altagracia La 's past history, todays findings, and per Anticoagulation Collaborative Practice Agreement/Protocol:       Fingerstick POC INR (3.1) is Supratherapeutic for INR goal today. Continue warfarin 10 mg Wed, Sat; 7.5 mg daily ROW  INR is 3.1 and slightly supratherapeutic for INR goal 2.0-3.0. Patient recalled warfarin plan from last visit (11/8) and denies missed or extra doses of warfarin. Patient denies changes to his other medications and reports consistent intake of vitamin K foods. Since INR has previously been therapeutic on this dose, patient recommended to continue 10 mg Wed, Sat; 7.5 mg daily ROW and recheck INR in 3 weeks. Patient was instructed to schedule an appointment in 3 week(s) prior to leaving the clinic. There are no discontinued medications. A full discussion of the nature of anticoagulants has been carried out. A full discussion of the need for frequent and regular monitoring, precise dosage adjustment and compliance was stressed. Side effects of potential bleeding were discussed and Mr. Altagracia La was instructed to call [phone] if there are any signs of abnormal bleeding. Mr. Altagracia La was instructed to avoid any OTC items containing aspirin or ibuprofen and prior to starting any new OTC products to consult with his physician or pharmacist to ensure no drug interactions are present.

## 2023-12-13 ENCOUNTER — ANTI-COAG VISIT (OUTPATIENT)
Facility: HOSPITAL | Age: 73
End: 2023-12-13
Payer: MEDICARE

## 2023-12-13 DIAGNOSIS — I48.0 PAROXYSMAL ATRIAL FIBRILLATION (HCC): Primary | ICD-10-CM

## 2023-12-13 DIAGNOSIS — Z79.01 LONG TERM (CURRENT) USE OF ANTICOAGULANTS: ICD-10-CM

## 2023-12-13 LAB
POC INR: 3.1
PROTHROMBIN TIME, POC: 37

## 2023-12-13 PROCEDURE — 85610 PROTHROMBIN TIME: CPT

## 2023-12-15 ENCOUNTER — OFFICE VISIT (OUTPATIENT)
Age: 73
End: 2023-12-15
Payer: MEDICARE

## 2023-12-15 VITALS
RESPIRATION RATE: 18 BRPM | HEART RATE: 82 BPM | SYSTOLIC BLOOD PRESSURE: 116 MMHG | WEIGHT: 237.2 LBS | OXYGEN SATURATION: 98 % | BODY MASS INDEX: 33.96 KG/M2 | HEIGHT: 70 IN | DIASTOLIC BLOOD PRESSURE: 72 MMHG | TEMPERATURE: 97.1 F

## 2023-12-15 DIAGNOSIS — E78.2 MIXED HYPERLIPIDEMIA: ICD-10-CM

## 2023-12-15 DIAGNOSIS — E11.65 TYPE 2 DIABETES MELLITUS WITH HYPERGLYCEMIA, WITHOUT LONG-TERM CURRENT USE OF INSULIN (HCC): Primary | ICD-10-CM

## 2023-12-15 DIAGNOSIS — I10 PRIMARY HYPERTENSION: ICD-10-CM

## 2023-12-15 LAB
CREAT UR-MCNC: 296 MG/DL
MICROALBUMIN UR-MCNC: 66.5 MG/DL
MICROALBUMIN/CREAT UR-RTO: 225 MG/G (ref 0–30)

## 2023-12-15 PROCEDURE — G8417 CALC BMI ABV UP PARAM F/U: HCPCS | Performed by: INTERNAL MEDICINE

## 2023-12-15 PROCEDURE — 3074F SYST BP LT 130 MM HG: CPT | Performed by: INTERNAL MEDICINE

## 2023-12-15 PROCEDURE — 3017F COLORECTAL CA SCREEN DOC REV: CPT | Performed by: INTERNAL MEDICINE

## 2023-12-15 PROCEDURE — 1123F ACP DISCUSS/DSCN MKR DOCD: CPT | Performed by: INTERNAL MEDICINE

## 2023-12-15 PROCEDURE — 3046F HEMOGLOBIN A1C LEVEL >9.0%: CPT | Performed by: INTERNAL MEDICINE

## 2023-12-15 PROCEDURE — 3078F DIAST BP <80 MM HG: CPT | Performed by: INTERNAL MEDICINE

## 2023-12-15 PROCEDURE — 2022F DILAT RTA XM EVC RTNOPTHY: CPT | Performed by: INTERNAL MEDICINE

## 2023-12-15 PROCEDURE — 1036F TOBACCO NON-USER: CPT | Performed by: INTERNAL MEDICINE

## 2023-12-15 PROCEDURE — G8484 FLU IMMUNIZE NO ADMIN: HCPCS | Performed by: INTERNAL MEDICINE

## 2023-12-15 PROCEDURE — 99214 OFFICE O/P EST MOD 30 MIN: CPT | Performed by: INTERNAL MEDICINE

## 2023-12-15 PROCEDURE — G8428 CUR MEDS NOT DOCUMENT: HCPCS | Performed by: INTERNAL MEDICINE

## 2023-12-15 NOTE — ASSESSMENT & PLAN NOTE
A1c last visit 7.4% (down from of 11.6% 5/2023, at 6.6% in 2020). Creatinine 0.85. Normal liver function. I reviewed provider notes, prior lab work.     Today A1c down to 6.4% w/dietary changes     Continue Metformin XR 1000 mg BID  -discussed diet modifications and to receive 150 min of exercise if possible per week of moderate intensity  -counseled patient on long term disease complications including but not limited to blindness, neuropathy, PVD/PAD, CAD and heart disease, MI/Death, renal insufficiency and/or renal failure    DM HM:  -ophtho: advised annual eval, no reports available today   -renal: elevated urine protein, repeat level   -feet: overgrown nails, refered podiatry last visit

## 2023-12-15 NOTE — ASSESSMENT & PLAN NOTE
The 10-year ASCVD risk score (Meghan HUGGINS, et al., 2019) is: 43.8%    Values used to calculate the score:      Age: 75 years      Sex: Male      Is Non- : No      Diabetic: Yes      Tobacco smoker: No      Systolic Blood Pressure: 022 mmHg      Is BP treated: Yes      HDL Cholesterol: 39 MG/DL      Total Cholesterol: 222 MG/DL    Continue high intensity statin therapy  Last LFTS WNL   Cardiology managing

## 2023-12-26 NOTE — PATIENT INSTRUCTIONS
Today your INR was 3.0.      Your goal INR is  2.0-3.0.    You have a   5 mg tablet of Coumadin (warfarin).   Take Coumadin (warfarin) as follows:    Take 10 mg (2 tab) Wednesday, Saturday and 7.5 mg (1.5 tab) daily rest of week    Come back in 4 week(s) for your next finger stick/INR blood test.        Avoid any over the counter items containing aspirin or ibuprofen, and avoid great swings in general diet.  Avoid alcohol consumption.  Please notify the INR nurse if you are started on any new medication including over the counter or herbal supplements. Also, please notify your INR nurse if any of your other prescription or over the counter medications have been discontinued.     Your Care Instructions  Warfarin is a medicine that you take to prevent blood clots. It is often called a blood thinner. Doctors give warfarin (such as Coumadin) to reduce the risk of blood clots. You may be at risk for blood clots if you have atrial fibrillation or deep vein thrombosis. Some other health problems may also put you at risk.  Warfarin slows the amount of time it takes for your blood to clot. It can cause bleeding problems. Even if you've been taking warfarin for a while, it's important to know how to take it safely.  Foods and other medicines can affect the way warfarin works. Some can make warfarin work too well. This can cause bleeding problems. And some can make it work poorly, so that it does not prevent blood clots very well.  You will need regular blood tests to check how long it takes for your blood to form a clot. This test is called a PT or prothrombin time test. The result of the test is called an INR level. Depending on the test results, your doctor or anticoagulation clinic may adjust your dose of warfarin.    Follow-up care is a key part of your treatment and safety. Be sure to make and go to all appointments, and call your doctor if you are having problems. It's also a good idea to know your test results and keep

## 2023-12-26 NOTE — PROGRESS NOTES
Pharmacy Progress Note - Anticoagulation Management      S/O:  Mr. Jayme Beal   is a 73 y.o. male seen today  for anticoagulation management for the diagnosis of Atrial Fibrillation.       HPI: At last visit (12/13) INR was 3.1 and slightly supratherapeutic for INR goal 2.0-3.0.   Patient recommended to continue 10 mg Wed, Sat; 7.5 mg daily ROW and recheck INR in 2 to 3 weeks.  Follow up INR scheduled on 1/3/24.      Interim History:       Warfarin start date: Prior to November 2018 per chart review    INR Goal:  2.0-3.0      Current warfarin regimen: 10 mg Wed, Sat; 7.5 mg daily ROW                  Warfarin tablet strength:   5 mg     Duration of therapy: Indefinite     Today's pertinent positives includes:  No significant changes since last visit    INR 3.0  PT 35.8    Adherence:   Able to recall regimen? YES  Miss/extra dose? NO  Need refill? NO    Upcoming procedure(s):  NO     INR History:              (normal INR range 0.8-1.2)       Date               INR                  PT        Dose/Comments  01/03/24 3.0  35.8  No changes  12/13/23 3.1  37.0  No changes  11/08/23 2.9  34.3  Increased to 10 mg Wed, Sat; 7.5 mg daily ROW  10/25/23 1.7  20.6  Metformin resumed  09/27/23 2.2  26.9  No changes  08/16/23 2.7  32.7  10 mg Wed; 7.5 mg daily ROW  07/26/23 3.4  40.8  Increased to 10 mg Wed, Sat; 7.5 mg daily ROW  07/05/23 1.8  21.7  No changes  05/30/23  2.2  26.8  Atorvastatin dose increased, metformin restarted  05/01/23         2.5                   29.6     No changes  04/03/23         2.9                   34.8     No changes  12/13/22         2.3                   27.3     No changes  11/10/22         3.1                   37.5     Weight loss in past month  10/12/22         2.1                   25.2     No changes  09/28/22         1.7                   20.0     No changes  08/31/22         2.6                   31.3     No changes   08/03/22         1.9                   22.9     No changes  07/06/22

## 2024-01-03 ENCOUNTER — ANTI-COAG VISIT (OUTPATIENT)
Facility: HOSPITAL | Age: 74
End: 2024-01-03
Payer: MEDICARE

## 2024-01-03 DIAGNOSIS — Z79.01 LONG TERM (CURRENT) USE OF ANTICOAGULANTS: ICD-10-CM

## 2024-01-03 DIAGNOSIS — I48.0 PAROXYSMAL ATRIAL FIBRILLATION (HCC): Primary | ICD-10-CM

## 2024-01-03 LAB — INTERNATIONAL NORMALIZATION RATIO, POC: 3

## 2024-01-03 PROCEDURE — 85610 PROTHROMBIN TIME: CPT

## 2024-01-03 PROCEDURE — 99211 OFF/OP EST MAY X REQ PHY/QHP: CPT

## 2024-01-22 DIAGNOSIS — E78.2 MIXED HYPERLIPIDEMIA: ICD-10-CM

## 2024-01-22 RX ORDER — ATORVASTATIN CALCIUM 40 MG/1
80 TABLET, FILM COATED ORAL DAILY
Qty: 180 TABLET | Refills: 1 | Status: SHIPPED | OUTPATIENT
Start: 2024-01-22

## 2024-01-22 RX ORDER — WARFARIN SODIUM 5 MG/1
TABLET ORAL
Qty: 160 TABLET | Refills: 0 | Status: SHIPPED | OUTPATIENT
Start: 2024-01-22

## 2024-01-22 NOTE — TELEPHONE ENCOUNTER
Requested Prescriptions     Signed Prescriptions Disp Refills    atorvastatin (LIPITOR) 40 MG tablet 180 tablet 1     Sig: TAKE 2 TABLETS BY MOUTH DAILY     Authorizing Provider: RITA SOLIS III     Ordering User: JAYA BEASLEY MD    Future Appointments   Date Time Provider Department Center   1/31/2024 10:45 AM [facility] MEDICATION [facility]   5/30/2024  3:00 PM Rita Solis III, MD [facility]   12/13/2024  9:15 AM Bridget Jenkins MD CDE [facility]

## 2024-01-30 NOTE — PATIENT INSTRUCTIONS
Today your INR was 2.6.      Your goal INR is  2.0-3.0.    You have a   5 mg tablet of Coumadin (warfarin).   Take Coumadin (warfarin) as follows:    Take 10 mg (2 tab) Wednesday, Saturday and 7.5 mg (1.5 tab) daily rest of week    Come back in 4 week(s) for your next finger stick/INR blood test.        Avoid any over the counter items containing aspirin or ibuprofen, and avoid great swings in general diet.  Avoid alcohol consumption.  Please notify the INR nurse if you are started on any new medication including over the counter or herbal supplements. Also, please notify your INR nurse if any of your other prescription or over the counter medications have been discontinued.     Your Care Instructions  Warfarin is a medicine that you take to prevent blood clots. It is often called a blood thinner. Doctors give warfarin (such as Coumadin) to reduce the risk of blood clots. You may be at risk for blood clots if you have atrial fibrillation or deep vein thrombosis. Some other health problems may also put you at risk.  Warfarin slows the amount of time it takes for your blood to clot. It can cause bleeding problems. Even if you've been taking warfarin for a while, it's important to know how to take it safely.  Foods and other medicines can affect the way warfarin works. Some can make warfarin work too well. This can cause bleeding problems. And some can make it work poorly, so that it does not prevent blood clots very well.  You will need regular blood tests to check how long it takes for your blood to form a clot. This test is called a PT or prothrombin time test. The result of the test is called an INR level. Depending on the test results, your doctor or anticoagulation clinic may adjust your dose of warfarin.    Follow-up care is a key part of your treatment and safety. Be sure to make and go to all appointments, and call your doctor if you are having problems. It's also a good idea to know your test results and keep

## 2024-01-30 NOTE — PROGRESS NOTES
any major changes in his general diet and to avoid alcohol consumption.    Mr. Beal was provided information in the AVS that includes topics on understanding coumadin therapy, drug interaction considerations, vitamin K and coumadin use, interactions with foods and supplements containing vitamin K, and the use of herbal products.    Mr. Beal verbalized his understanding of all instructions and will call the office with any questions, concerns, or signs of abnormal bleeding or blood clot.  Notifications of recommendations will be sent to Dr. Silas Solis III, MD for review.    Thank you,  Sumit Alvarez, PharmD      For Pharmacy Admin Tracking Only    Intervention Detail: Lab(s) Ordered  Total # of Interventions Recommended: 1  Total # of Interventions Accepted: 1  Time Spent (min): 20

## 2024-01-31 ENCOUNTER — ANTI-COAG VISIT (OUTPATIENT)
Facility: HOSPITAL | Age: 74
End: 2024-01-31
Payer: MEDICARE

## 2024-01-31 DIAGNOSIS — I48.0 PAROXYSMAL ATRIAL FIBRILLATION (HCC): Primary | ICD-10-CM

## 2024-01-31 DIAGNOSIS — Z79.01 LONG TERM (CURRENT) USE OF ANTICOAGULANTS: ICD-10-CM

## 2024-01-31 LAB — INTERNATIONAL NORMALIZATION RATIO, POC: 2.6

## 2024-01-31 PROCEDURE — 99211 OFF/OP EST MAY X REQ PHY/QHP: CPT

## 2024-01-31 PROCEDURE — 85610 PROTHROMBIN TIME: CPT

## 2024-02-27 NOTE — PATIENT INSTRUCTIONS
Today your INR was 2.3.      Your goal INR is  2.0-3.0.    You have a   5 mg tablet of Coumadin (warfarin).   Take Coumadin (warfarin) as follows:    Take 10 mg (2 tab) Wednesday, Saturday and 7.5 mg (1.5 tab) daily rest of week    Come back in 4 week(s) for your next finger stick/INR blood test.        Avoid any over the counter items containing aspirin or ibuprofen, and avoid great swings in general diet.  Avoid alcohol consumption.  Please notify the INR nurse if you are started on any new medication including over the counter or herbal supplements. Also, please notify your INR nurse if any of your other prescription or over the counter medications have been discontinued.     Your Care Instructions  Warfarin is a medicine that you take to prevent blood clots. It is often called a blood thinner. Doctors give warfarin (such as Coumadin) to reduce the risk of blood clots. You may be at risk for blood clots if you have atrial fibrillation or deep vein thrombosis. Some other health problems may also put you at risk.  Warfarin slows the amount of time it takes for your blood to clot. It can cause bleeding problems. Even if you've been taking warfarin for a while, it's important to know how to take it safely.  Foods and other medicines can affect the way warfarin works. Some can make warfarin work too well. This can cause bleeding problems. And some can make it work poorly, so that it does not prevent blood clots very well.  You will need regular blood tests to check how long it takes for your blood to form a clot. This test is called a PT or prothrombin time test. The result of the test is called an INR level. Depending on the test results, your doctor or anticoagulation clinic may adjust your dose of warfarin.    Follow-up care is a key part of your treatment and safety. Be sure to make and go to all appointments, and call your doctor if you are having problems. It's also a good idea to know your test results and keep

## 2024-02-27 NOTE — PROGRESS NOTES
Pharmacy Progress Note - Anticoagulation Management      S/O:  Mr. Jayme Beal   is a 73 y.o. male seen today  for anticoagulation management for the diagnosis of Atrial Fibrillation.       HPI: At last visit (1/31) INR was 2.6 and therapeutic for INR goal 2.0-3.0.  Patient recommended to continue 10 mg Wed, Sat; 7.5 mg daily ROW and recheck INR in 4 weeks.       Interim History:       Warfarin start date: Prior to November 2018 per chart review    INR Goal:  2.0-3.0      Current warfarin regimen: 10 mg Wed, Sat; 7.5 mg daily ROW                  Warfarin tablet strength:   5 mg     Duration of therapy: Indefinite     Today's pertinent positives includes:  No significant changes since last visit    INR 2.3  PT 27.0    Adherence:   Able to recall regimen? YES  Miss/extra dose? NO  Need refill? NO    Upcoming procedure(s):  NO     INR History:              (normal INR range 0.8-1.2)       Date               INR                  PT        Dose/Comments  02/28/24 2.3  27.0  No changes  01/31/24 2.6  31.1  No changes  01/03/24 3.0  35.8  No changes  12/13/23 3.1  37.0  No changes  11/08/23 2.9  34.3  Increased to 10 mg Wed, Sat; 7.5 mg daily ROW  10/25/23 1.7  20.6  Metformin resumed  09/27/23 2.2  26.9  No changes  08/16/23 2.7  32.7  10 mg Wed; 7.5 mg daily ROW  07/26/23 3.4  40.8  Increased to 10 mg Wed, Sat; 7.5 mg daily ROW  07/05/23 1.8  21.7  No changes  05/30/23  2.2  26.8  Atorvastatin dose increased, metformin restarted  05/01/23         2.5                   29.6     No changes  04/03/23         2.9                   34.8     No changes  12/13/22         2.3                   27.3     No changes  11/10/22         3.1                   37.5     Weight loss in past month  10/12/22         2.1                   25.2     No changes  09/28/22         1.7                   20.0     No changes  08/31/22         2.6                   31.3     No changes   08/03/22         1.9                   22.9     No

## 2024-02-28 ENCOUNTER — ANTI-COAG VISIT (OUTPATIENT)
Facility: HOSPITAL | Age: 74
End: 2024-02-28
Payer: MEDICARE

## 2024-02-28 DIAGNOSIS — I48.0 PAROXYSMAL ATRIAL FIBRILLATION (HCC): Primary | ICD-10-CM

## 2024-02-28 DIAGNOSIS — Z79.01 LONG TERM (CURRENT) USE OF ANTICOAGULANTS: ICD-10-CM

## 2024-02-28 LAB — INTERNATIONAL NORMALIZATION RATIO, POC: 2.3

## 2024-02-28 PROCEDURE — 99211 OFF/OP EST MAY X REQ PHY/QHP: CPT

## 2024-02-28 PROCEDURE — 85610 PROTHROMBIN TIME: CPT

## 2024-03-23 DIAGNOSIS — E11.65 TYPE 2 DIABETES MELLITUS WITH HYPERGLYCEMIA, WITHOUT LONG-TERM CURRENT USE OF INSULIN (HCC): ICD-10-CM

## 2024-03-25 RX ORDER — METFORMIN HYDROCHLORIDE 500 MG/1
TABLET, EXTENDED RELEASE ORAL
Qty: 360 TABLET | Refills: 3 | Status: SHIPPED | OUTPATIENT
Start: 2024-03-25

## 2024-04-30 NOTE — PATIENT INSTRUCTIONS
clutter.  Rearrange furniture and electrical cords to keep them out of walking paths.  Keep stairways, porches, and outside walkways well lit. Use night-lights in hallways and bathrooms.  Be extra careful when you work with sharp tools or knives.    When should you call for help?  Call 911 anytime you think you may need emergency care. For example, call if:  You have a sudden, severe headache that is different from past headaches.  Call your doctor now or seek immediate medical care if:  You have any abnormal bleeding, such as:  Nosebleeds.  Vaginal bleeding that is different (heavier, more frequent, at a different time of the month) than what you are used to.  Bloody or black stools, or rectal bleeding.  Bloody or pink urine.  Watch closely for changes in your health, and be sure to contact your doctor if you have any problems.    Where can you learn more?  Go to http://www.[employer].net/[employer].  Enter N655 in the search box to learn more about \"Taking Warfarin Safely: Care Instructions.\"  Current as of: January 27, 2016  Content Version: 11.1  © 2299-0669 [employer]. Care instructions adapted under license by [employer] (which disclaims liability or warranty for this information). If you have questions about a medical condition or this instruction, always ask your healthcare professional. [employer] disclaims any warranty or liability for your use of this information.

## 2024-04-30 NOTE — PROGRESS NOTES
Pharmacy Progress Note - Anticoagulation Management      S/O:  Mr. Jayme Beal   is a 73 y.o. male seen today  for anticoagulation management for the diagnosis of Atrial Fibrillation.       HPI: At last visit (2/28) INR was 2.3 and therapeutic for INR goal 2.0-3.0.  Patient recommended to continue 10 mg Wed, Sat; 7.5 mg daily ROW and recheck INR in 4 weeks.  Patient missed appointment on 3/27 and was contacted to reschedule.  Patient was contacted again on 4/30 and rescheduled appointment to 5/1.      Interim History:       Warfarin start date: Prior to November 2018 per chart review    INR Goal:  2.0-3.0      Current warfarin regimen: 10 mg Wed, Sat; 7.5 mg daily ROW                  Warfarin tablet strength:   5 mg     Duration of therapy: Indefinite     Today's pertinent positives includes:  No significant changes since last visit    INR 2.1  PT 25.7    Adherence:   Able to recall regimen? YES  Miss/extra dose? NO  Need refill? NO    Upcoming procedure(s):  NO     INR History:              (normal INR range 0.8-1.2)       Date               INR                  PT        Dose/Comments  05/01/24 2.1  25.7  No changes  02/28/24 2.3  27.0  No changes  01/31/24 2.6  31.1  No changes  01/03/24 3.0  35.8  No changes  12/13/23 3.1  37.0  No changes  11/08/23 2.9  34.3  Increased to 10 mg Wed, Sat; 7.5 mg daily ROW  10/25/23 1.7  20.6  Metformin resumed  09/27/23 2.2  26.9  No changes  08/16/23 2.7  32.7  10 mg Wed; 7.5 mg daily ROW  07/26/23 3.4  40.8  Increased to 10 mg Wed, Sat; 7.5 mg daily ROW  07/05/23 1.8  21.7  No changes  05/30/23  2.2  26.8  Atorvastatin dose increased, metformin restarted  05/01/23         2.5                   29.6     No changes  04/03/23         2.9                   34.8     No changes  12/13/22         2.3                   27.3     No changes  11/10/22         3.1                   37.5     Weight loss in past month  10/12/22         2.1                   25.2     No changes  09/28/22

## 2024-05-01 ENCOUNTER — ANTI-COAG VISIT (OUTPATIENT)
Facility: HOSPITAL | Age: 74
End: 2024-05-01
Payer: MEDICARE

## 2024-05-01 DIAGNOSIS — Z79.01 LONG TERM (CURRENT) USE OF ANTICOAGULANTS: ICD-10-CM

## 2024-05-01 DIAGNOSIS — I48.0 PAROXYSMAL ATRIAL FIBRILLATION (HCC): Primary | ICD-10-CM

## 2024-05-01 LAB — INTERNATIONAL NORMALIZATION RATIO, POC: 2.1

## 2024-05-01 PROCEDURE — 99211 OFF/OP EST MAY X REQ PHY/QHP: CPT

## 2024-05-01 PROCEDURE — 85610 PROTHROMBIN TIME: CPT

## 2024-05-01 RX ORDER — WARFARIN SODIUM 5 MG/1
TABLET ORAL
Qty: 160 TABLET | Refills: 0 | Status: SHIPPED | OUTPATIENT
Start: 2024-05-01

## 2024-05-24 NOTE — PATIENT INSTRUCTIONS
keep a list of the medicines you take.    How can you care for yourself at home?  Take warfarin safely  Take your warfarin at the same time each day.  If you miss a dose of warfarin, don't take an extra dose to make up for it. Your doctor can tell you exactly what to do so you don't take too much or too little.  Wear medical alert jewelry that lets others know that you take warfarin. You can buy this at most drugstores.  Don't take warfarin if you are pregnant or planning to get pregnant. Talk to your doctor about how you can prevent getting pregnant while you are taking it.  Don't change your dose or stop taking warfarin unless your doctor tells you to.  Effects of medicines and food on warfarin  Don't start or stop taking any medicines, vitamins, or natural remedies unless you first talk to your doctor. Many medicines can affect how warfarin works. These include aspirin and other pain relievers, over-the-counter medicines, multivitamins, dietary supplements, and herbal products.  Tell all of your doctors and pharmacists that you take warfarin. Some prescription medicines can affect how warfarin works.  Keep the amount of vitamin K in your diet about the same from day to day. Do not suddenly eat a lot more or a lot less food that is rich in vitamin K than you usually do. Vitamin K affects how warfarin works and how your blood clots. Talk with your doctor before making big changes in your diet. Vitamin K is in many foods, such as:  Leafy greens, such as kale, cabbage, spinach, Swiss chard, and lettuce.  Canola and soybean oils.  Green vegetables, such as asparagus, broccoli, and [place] sprouts.  Vegetable drinks, green tea leaves, and some dietary supplement drinks.  Avoid cranberry juice and other cranberry products. They can increase the effects of warfarin.  Limit your use of alcohol.    Avoid bleeding by preventing falls and injuries  Wear slippers or shoes with nonskid soles.  Remove throw rugs and

## 2024-05-24 NOTE — PROGRESS NOTES
Pharmacy Progress Note - Anticoagulation Management      S/O:  Mr. Jayme Beal   is a 73 y.o. male seen today  for anticoagulation management for the diagnosis of Atrial Fibrillation.       HPI: At last visit (5/1) INR was 2.1 and therapeutic for INR goal 2.0-3.0.  Patient recommended to continue 10 mg Wed, Sat; 7.5 mg daily ROW and recheck INR in 4 weeks.       Interim History:       Warfarin start date: Prior to November 2018 per chart review    INR Goal:  2.0-3.0      Current warfarin regimen: 10 mg Wed, Sat; 7.5 mg daily ROW                  Warfarin tablet strength:   5 mg     Duration of therapy: Indefinite     Today's pertinent positives includes:  No significant changes since last visit    INR 3.1  PT 36.7    Adherence:   Able to recall regimen? YES  Miss/extra dose? NO  Need refill? NO    Upcoming procedure(s):  NO     INR History:              (normal INR range 0.8-1.2)       Date               INR                  PT        Dose/Comments  05/29/24 3.1  36.7  No changes  05/01/24 2.1  25.7  No changes  02/28/24 2.3  27.0  No changes  01/31/24 2.6  31.1  No changes  01/03/24 3.0  35.8  No changes  12/13/23 3.1  37.0  No changes  11/08/23 2.9  34.3  Increased to 10 mg Wed, Sat; 7.5 mg daily ROW  10/25/23 1.7  20.6  Metformin resumed  09/27/23 2.2  26.9  No changes  08/16/23 2.7  32.7  10 mg Wed; 7.5 mg daily ROW  07/26/23 3.4  40.8  Increased to 10 mg Wed, Sat; 7.5 mg daily ROW  07/05/23 1.8  21.7  No changes  05/30/23  2.2  26.8  Atorvastatin dose increased, metformin restarted  05/01/23         2.5                   29.6     No changes  04/03/23         2.9                   34.8     No changes  12/13/22         2.3                   27.3     No changes  11/10/22         3.1                   37.5     Weight loss in past month  10/12/22         2.1                   25.2     No changes  09/28/22         1.7                   20.0     No changes  08/31/22         2.6                   31.3     No changes

## 2024-05-29 ENCOUNTER — ANTI-COAG VISIT (OUTPATIENT)
Facility: HOSPITAL | Age: 74
End: 2024-05-29
Payer: MEDICARE

## 2024-05-29 DIAGNOSIS — Z79.01 LONG TERM (CURRENT) USE OF ANTICOAGULANTS: ICD-10-CM

## 2024-05-29 DIAGNOSIS — I48.0 PAROXYSMAL ATRIAL FIBRILLATION (HCC): Primary | ICD-10-CM

## 2024-05-29 LAB — INTERNATIONAL NORMALIZATION RATIO, POC: 3.1

## 2024-05-29 PROCEDURE — 85610 PROTHROMBIN TIME: CPT

## 2024-05-29 PROCEDURE — 99211 OFF/OP EST MAY X REQ PHY/QHP: CPT

## 2024-05-30 ENCOUNTER — OFFICE VISIT (OUTPATIENT)
Age: 74
End: 2024-05-30
Payer: MEDICARE

## 2024-05-30 VITALS
DIASTOLIC BLOOD PRESSURE: 66 MMHG | HEART RATE: 98 BPM | SYSTOLIC BLOOD PRESSURE: 130 MMHG | OXYGEN SATURATION: 97 % | HEIGHT: 70 IN | BODY MASS INDEX: 33.5 KG/M2 | WEIGHT: 234 LBS

## 2024-05-30 DIAGNOSIS — E78.2 MIXED HYPERLIPIDEMIA: ICD-10-CM

## 2024-05-30 DIAGNOSIS — I25.10 CORONARY ARTERY DISEASE INVOLVING NATIVE CORONARY ARTERY OF NATIVE HEART WITHOUT ANGINA PECTORIS: ICD-10-CM

## 2024-05-30 DIAGNOSIS — I48.0 PAROXYSMAL ATRIAL FIBRILLATION (HCC): Primary | ICD-10-CM

## 2024-05-30 DIAGNOSIS — Z79.01 LONG TERM (CURRENT) USE OF ANTICOAGULANTS: ICD-10-CM

## 2024-05-30 PROCEDURE — 99214 OFFICE O/P EST MOD 30 MIN: CPT | Performed by: SPECIALIST

## 2024-05-30 RX ORDER — ATORVASTATIN CALCIUM 80 MG/1
80 TABLET, FILM COATED ORAL DAILY
Qty: 90 TABLET | Refills: 1 | Status: SHIPPED | OUTPATIENT
Start: 2024-05-30

## 2024-05-30 ASSESSMENT — PATIENT HEALTH QUESTIONNAIRE - PHQ9
2. FEELING DOWN, DEPRESSED OR HOPELESS: NOT AT ALL
SUM OF ALL RESPONSES TO PHQ QUESTIONS 1-9: 0
1. LITTLE INTEREST OR PLEASURE IN DOING THINGS: NOT AT ALL
SUM OF ALL RESPONSES TO PHQ QUESTIONS 1-9: 0
SUM OF ALL RESPONSES TO PHQ9 QUESTIONS 1 & 2: 0
SUM OF ALL RESPONSES TO PHQ QUESTIONS 1-9: 0
SUM OF ALL RESPONSES TO PHQ QUESTIONS 1-9: 0

## 2024-05-30 NOTE — PROGRESS NOTES
lb 3.2 oz)   11/28/23 117.9 kg (260 lb)            BP Readings from Last 3 Encounters:   05/30/24 130/66   12/15/23 116/72   11/28/23 126/72       PHYSICAL EXAM  General appearance: alert, appears stated age, and cooperative  Neck: no adenopathy, no carotid bruit, no JVD, and supple, symmetrical, trachea midline  Lungs: clear to auscultation bilaterally  Heart: irregularly irregular rhythm, S1, S2 normal, and no S3 or S4  Extremities: extremities normal, atraumatic, no cyanosis or edema      Lab Results   Component Value Date    CHOL 222 (H) 05/23/2023    TRIG 237 (H) 05/23/2023    HDL 39 05/23/2023    VLDL 47.4 05/23/2023    CHOLHDLRATIO 5.7 (H) 05/23/2023      Lab Results   Component Value Date     05/23/2023    K 4.6 05/23/2023     05/23/2023    CO2 21 05/23/2023    BUN 21 (H) 05/23/2023    CREATININE 0.85 05/23/2023    GLUCOSE 338 (H) 05/23/2023    CALCIUM 9.5 05/23/2023    BILITOT 0.8 05/23/2023    ALKPHOS 79 05/23/2023    AST 11 (L) 05/23/2023    ALT 20 05/23/2023    LABGLOM >60 05/23/2023    GFRAA >60 11/03/2021    AGRATIO 1.1 11/03/2021    GLOB 3.5 05/23/2023       Current Outpatient Medications   Medication Sig Dispense Refill    warfarin (COUMADIN) 5 MG tablet TAKE 2 TABLETS (10 MG) WEDNESDAY, SATURDAY AND 1.5 TABLETS (7.5 MG) DAILY REST OF THE WEEK OR AS DIRECTED BY CLINIC PROVIDER. 160 tablet 0    metFORMIN (GLUCOPHAGE-XR) 500 MG extended release tablet TAKE 2 TABLETS BY MOUTH IN THE MORNING AND 2 AT BEDTIME 360 tablet 3    atorvastatin (LIPITOR) 40 MG tablet TAKE 2 TABLETS BY MOUTH DAILY 180 tablet 1    Multiple Vitamin (MULTIVITAMIN ADULT PO) Take 1 tablet by mouth Twice a Week      carvedilol (COREG) 25 MG tablet Take 1 tablet by mouth twice daily 180 tablet 3    omeprazole (PRILOSEC) 20 MG delayed release capsule Take 1 capsule by mouth daily       No current facility-administered medications for this visit.       ASSESSMENT & PLAN:      He is stable and I think asymptomatic at this point

## 2024-06-12 NOTE — PROGRESS NOTES
was instructed to avoid any OTC items containing aspirin or ibuprofen and prior to starting any new OTC products to consult with his physician or pharmacist to ensure no drug interactions are present.  Mr. Beal was instructed to avoid any major changes in his general diet and to avoid alcohol consumption.    Mr. Beal was provided information in the AVS that includes topics on understanding coumadin therapy, drug interaction considerations, vitamin K and coumadin use, interactions with foods and supplements containing vitamin K, and the use of herbal products.    Mr. Beal verbalized his understanding of all instructions and will call the office with any questions, concerns, or signs of abnormal bleeding or blood clot.  Notifications of recommendations will be sent to Dr. Silas Solis III, MD for review.    Thank you,  Sumit Alvarez, PharmD      For Pharmacy Admin Tracking Only    Intervention Detail: Lab(s) Ordered  Total # of Interventions Recommended: 1  Total # of Interventions Accepted: 1  Time Spent (min): 20

## 2024-06-12 NOTE — PATIENT INSTRUCTIONS
list of the medicines you take.    How can you care for yourself at home?  Take warfarin safely  Take your warfarin at the same time each day.  If you miss a dose of warfarin, don't take an extra dose to make up for it. Your doctor can tell you exactly what to do so you don't take too much or too little.  Wear medical alert jewelry that lets others know that you take warfarin. You can buy this at most drugstores.  Don't take warfarin if you are pregnant or planning to get pregnant. Talk to your doctor about how you can prevent getting pregnant while you are taking it.  Don't change your dose or stop taking warfarin unless your doctor tells you to.  Effects of medicines and food on warfarin  Don't start or stop taking any medicines, vitamins, or natural remedies unless you first talk to your doctor. Many medicines can affect how warfarin works. These include aspirin and other pain relievers, over-the-counter medicines, multivitamins, dietary supplements, and herbal products.  Tell all of your doctors and pharmacists that you take warfarin. Some prescription medicines can affect how warfarin works.  Keep the amount of vitamin K in your diet about the same from day to day. Do not suddenly eat a lot more or a lot less food that is rich in vitamin K than you usually do. Vitamin K affects how warfarin works and how your blood clots. Talk with your doctor before making big changes in your diet. Vitamin K is in many foods, such as:  Leafy greens, such as kale, cabbage, spinach, Swiss chard, and lettuce.  Canola and soybean oils.  Green vegetables, such as asparagus, broccoli, and [place] sprouts.  Vegetable drinks, green tea leaves, and some dietary supplement drinks.  Avoid cranberry juice and other cranberry products. They can increase the effects of warfarin.  Limit your use of alcohol.    Avoid bleeding by preventing falls and injuries  Wear slippers or shoes with nonskid soles.  Remove throw rugs and

## 2024-06-19 ENCOUNTER — ANTI-COAG VISIT (OUTPATIENT)
Facility: HOSPITAL | Age: 74
End: 2024-06-19
Payer: MEDICARE

## 2024-06-19 DIAGNOSIS — I48.0 PAROXYSMAL ATRIAL FIBRILLATION (HCC): Primary | ICD-10-CM

## 2024-06-19 DIAGNOSIS — Z79.01 LONG TERM (CURRENT) USE OF ANTICOAGULANTS: ICD-10-CM

## 2024-06-19 LAB — INTERNATIONAL NORMALIZATION RATIO, POC: 2.4

## 2024-06-19 PROCEDURE — 85610 PROTHROMBIN TIME: CPT

## 2024-06-19 PROCEDURE — 99211 OFF/OP EST MAY X REQ PHY/QHP: CPT

## 2024-07-11 NOTE — PATIENT INSTRUCTIONS
Today your INR was 2.3.      Your goal INR is  2.0-3.0.    You have a   5 mg tablet of Coumadin (warfarin).   Take Coumadin (warfarin) as follows:    Take 10 mg (2 tab) Wednesday, Saturday and 7.5 mg (1.5 tab) daily rest of week    Come back in 4 week(s) for your next finger stick/INR blood test.      Avoid any over the counter items containing aspirin or ibuprofen, and avoid great swings in general diet.  Avoid alcohol consumption.  Please notify the INR nurse if you are started on any new medication including over the counter or herbal supplements. Also, please notify your INR nurse if any of your other prescription or over the counter medications have been discontinued.     Your Care Instructions  Warfarin is a medicine that you take to prevent blood clots. It is often called a blood thinner. Doctors give warfarin (such as Coumadin) to reduce the risk of blood clots. You may be at risk for blood clots if you have atrial fibrillation or deep vein thrombosis. Some other health problems may also put you at risk.  Warfarin slows the amount of time it takes for your blood to clot. It can cause bleeding problems. Even if you've been taking warfarin for a while, it's important to know how to take it safely.  Foods and other medicines can affect the way warfarin works. Some can make warfarin work too well. This can cause bleeding problems. And some can make it work poorly, so that it does not prevent blood clots very well.  You will need regular blood tests to check how long it takes for your blood to form a clot. This test is called a PT or prothrombin time test. The result of the test is called an INR level. Depending on the test results, your doctor or anticoagulation clinic may adjust your dose of warfarin.    Follow-up care is a key part of your treatment and safety. Be sure to make and go to all appointments, and call your doctor if you are having problems. It's also a good idea to know your test results and keep a

## 2024-07-11 NOTE — PROGRESS NOTES
changes  08/31/22         2.6                   31.3     No changes   08/03/22         1.9                   22.9     No changes  07/06/22         2.7                   32.3     No changes  06/08/22         2.3                   27.9     Increased to 10 mg Wed; 7.5 mg daily ROW  05/25/22         1.9                   23.2     Metformin started 5/4 04/27/22         2.2                   25.9     No changes  03/30/22          2.2                   26.7     No changes  03/02/22          2.1                   25.0     No changes  02/02/22          2.1                   25.4     No changes  01/13/22          1.9                   23.3     No changes  12/03/21          2.1                               7.5 mg daily      A/P:         Anticoagulation:   Considering Mr. Beal 's past history, todays findings, and per Anticoagulation Collaborative Practice Agreement/Protocol:       Fingerstick POC INR (2.3) is Therapeutic for INR goal today.    Continue warfarin 10 mg Wed, Sat; 7.5 mg daily ROW  INR is 2.3 and therapeutic for INR goal 2.0-3.0.  Patient recalled warfarin plan from last visit (6/19) and denies missed or extra doses of warfarin.  Patient denies changes to other medications and reports consistent intake of vitamin K foods.  Since INR is therapeutic, patient recommended to continue 10 mg Wed, Sat; 7.5 mg daily ROW and recheck INR in 4 weeks.       Patient was instructed to schedule an appointment in 4 week(s) prior to leaving the clinic.    There are no discontinued medications.     A full discussion of the nature of anticoagulants has been carried out.  A full discussion of the need for frequent and regular monitoring, precise dosage adjustment and compliance was stressed.  Side effects of potential bleeding were discussed and Mr. Beal was instructed to call [phone] if there are any signs of abnormal bleeding.  Mr. Beal was instructed to avoid any OTC items containing aspirin or ibuprofen and prior

## 2024-07-17 ENCOUNTER — ANTI-COAG VISIT (OUTPATIENT)
Facility: HOSPITAL | Age: 74
End: 2024-07-17
Payer: MEDICARE

## 2024-07-17 DIAGNOSIS — Z79.01 LONG TERM (CURRENT) USE OF ANTICOAGULANTS: ICD-10-CM

## 2024-07-17 DIAGNOSIS — I48.0 PAROXYSMAL ATRIAL FIBRILLATION (HCC): Primary | ICD-10-CM

## 2024-07-17 LAB
INTERNATIONAL NORMALIZATION RATIO, POC: 2.3
PROTHROMBIN TIME, POC: NORMAL

## 2024-07-17 PROCEDURE — 99211 OFF/OP EST MAY X REQ PHY/QHP: CPT

## 2024-07-17 PROCEDURE — 85610 PROTHROMBIN TIME: CPT

## 2024-07-25 DIAGNOSIS — I48.0 PAROXYSMAL ATRIAL FIBRILLATION (HCC): ICD-10-CM

## 2024-07-25 DIAGNOSIS — I25.10 CORONARY ARTERY DISEASE INVOLVING NATIVE CORONARY ARTERY OF NATIVE HEART WITHOUT ANGINA PECTORIS: ICD-10-CM

## 2024-07-25 RX ORDER — CARVEDILOL 25 MG/1
TABLET ORAL
Qty: 180 TABLET | Refills: 1 | Status: SHIPPED | OUTPATIENT
Start: 2024-07-25

## 2024-07-25 NOTE — TELEPHONE ENCOUNTER
Requested Prescriptions     Signed Prescriptions Disp Refills    carvedilol (COREG) 25 MG tablet 180 tablet 1     Sig: Take 1 tablet by mouth twice daily     Authorizing Provider: RITA SOLIS III     Ordering User: ISATU HUDSON    Per Dr. Solis's verbal order.